# Patient Record
Sex: MALE | Race: WHITE | Employment: OTHER | URBAN - METROPOLITAN AREA
[De-identification: names, ages, dates, MRNs, and addresses within clinical notes are randomized per-mention and may not be internally consistent; named-entity substitution may affect disease eponyms.]

---

## 2018-03-27 ENCOUNTER — HOSPITAL ENCOUNTER (OUTPATIENT)
Dept: LAB | Age: 63
Discharge: HOME | End: 2018-03-27

## 2018-03-27 PROCEDURE — 88305 TISSUE EXAM BY PATHOLOGIST: CPT | Performed by: INTERNAL MEDICINE

## 2019-01-24 ENCOUNTER — HOSPITAL ENCOUNTER (INPATIENT)
Age: 64
LOS: 5 days | Discharge: HOME | DRG: 392 | End: 2019-01-29
Attending: EMERGENCY MEDICINE | Admitting: SURGERY
Payer: COMMERCIAL

## 2019-01-24 ENCOUNTER — HOSPITAL ENCOUNTER (OUTPATIENT)
Dept: CT IMAGING | Age: 64
Discharge: HOME | DRG: 392 | End: 2019-01-24
Attending: FAMILY MEDICINE
Payer: COMMERCIAL

## 2019-01-24 VITALS — BODY MASS INDEX: 33.32 KG/M2 | WEIGHT: 238 LBS | HEIGHT: 71 IN

## 2019-01-24 DIAGNOSIS — K57.20 DIVERTICULITIS OF LARGE INTESTINE WITH PERFORATION WITHOUT BLEEDING: Primary | ICD-10-CM

## 2019-01-24 DIAGNOSIS — R10.9 ABDOMINAL PAIN, UNSPECIFIED ABDOMINAL LOCATION: ICD-10-CM

## 2019-01-24 PROBLEM — K66.8 FREE INTRAPERITONEAL AIR: Status: ACTIVE | Noted: 2019-01-24

## 2019-01-24 PROBLEM — K57.92 ACUTE DIVERTICULITIS: Status: ACTIVE | Noted: 2019-01-24

## 2019-01-24 PROBLEM — D72.829 LEUKOCYTOSIS: Status: ACTIVE | Noted: 2019-01-24

## 2019-01-24 PROBLEM — R10.32 LLQ PAIN: Status: ACTIVE | Noted: 2019-01-24

## 2019-01-24 PROBLEM — K76.0 HEPATIC STEATOSIS: Status: ACTIVE | Noted: 2019-01-24

## 2019-01-24 LAB
ALBUMIN SERPL-MCNC: 3.7 G/DL (ref 3.2–4.6)
ALBUMIN/GLOB SERPL: 1.2 {RATIO}
ALP SERPL-CCNC: 31 U/L (ref 50–136)
ALT SERPL-CCNC: 21 U/L (ref 12–65)
ANION GAP SERPL CALC-SCNC: 4 MMOL/L
AST SERPL-CCNC: 6 U/L (ref 15–37)
BILIRUB SERPL-MCNC: 0.5 MG/DL (ref 0.2–1.1)
BUN SERPL-MCNC: 11 MG/DL (ref 8–23)
CALCIUM SERPL-MCNC: 9.2 MG/DL (ref 8.3–10.4)
CHLORIDE SERPL-SCNC: 107 MMOL/L (ref 98–107)
CO2 SERPL-SCNC: 27 MMOL/L (ref 21–32)
CREAT BLD-MCNC: 1 MG/DL (ref 0.8–1.5)
CREAT SERPL-MCNC: 0.91 MG/DL (ref 0.8–1.5)
GLOBULIN SER CALC-MCNC: 3.2 G/DL (ref 2.3–3.5)
GLUCOSE SERPL-MCNC: 101 MG/DL (ref 65–100)
INR PPP: 1.1
LIPASE SERPL-CCNC: 131 U/L (ref 73–393)
POTASSIUM SERPL-SCNC: 3.6 MMOL/L (ref 3.5–5.1)
PROT SERPL-MCNC: 6.9 G/DL
PROTHROMBIN TIME: 14.2 SEC (ref 11.7–14.5)
SODIUM SERPL-SCNC: 138 MMOL/L (ref 136–145)

## 2019-01-24 PROCEDURE — 74011250636 HC RX REV CODE- 250/636: Performed by: SURGERY

## 2019-01-24 PROCEDURE — 65270000029 HC RM PRIVATE

## 2019-01-24 PROCEDURE — 74177 CT ABD & PELVIS W/CONTRAST: CPT

## 2019-01-24 PROCEDURE — 74011000258 HC RX REV CODE- 258: Performed by: FAMILY MEDICINE

## 2019-01-24 PROCEDURE — 74011250636 HC RX REV CODE- 250/636: Performed by: EMERGENCY MEDICINE

## 2019-01-24 PROCEDURE — 82565 ASSAY OF CREATININE: CPT

## 2019-01-24 PROCEDURE — 74011636320 HC RX REV CODE- 636/320: Performed by: FAMILY MEDICINE

## 2019-01-24 PROCEDURE — 80053 COMPREHEN METABOLIC PANEL: CPT

## 2019-01-24 PROCEDURE — 99284 EMERGENCY DEPT VISIT MOD MDM: CPT | Performed by: EMERGENCY MEDICINE

## 2019-01-24 PROCEDURE — 83690 ASSAY OF LIPASE: CPT

## 2019-01-24 PROCEDURE — 85610 PROTHROMBIN TIME: CPT

## 2019-01-24 RX ORDER — METRONIDAZOLE 500 MG/100ML
500 INJECTION, SOLUTION INTRAVENOUS EVERY 12 HOURS
Status: DISCONTINUED | OUTPATIENT
Start: 2019-01-25 | End: 2019-01-25

## 2019-01-24 RX ORDER — MORPHINE SULFATE 10 MG/ML
2-6 INJECTION, SOLUTION INTRAMUSCULAR; INTRAVENOUS
Status: DISCONTINUED | OUTPATIENT
Start: 2019-01-24 | End: 2019-01-24 | Stop reason: SDUPTHER

## 2019-01-24 RX ORDER — SODIUM CHLORIDE, SODIUM LACTATE, POTASSIUM CHLORIDE, CALCIUM CHLORIDE 600; 310; 30; 20 MG/100ML; MG/100ML; MG/100ML; MG/100ML
150 INJECTION, SOLUTION INTRAVENOUS CONTINUOUS
Status: DISCONTINUED | OUTPATIENT
Start: 2019-01-24 | End: 2019-01-26

## 2019-01-24 RX ORDER — MORPHINE SULFATE 10 MG/ML
2-6 INJECTION, SOLUTION INTRAMUSCULAR; INTRAVENOUS
Status: DISCONTINUED | OUTPATIENT
Start: 2019-01-24 | End: 2019-01-25

## 2019-01-24 RX ORDER — ENOXAPARIN SODIUM 100 MG/ML
40 INJECTION SUBCUTANEOUS EVERY 24 HOURS
Status: DISCONTINUED | OUTPATIENT
Start: 2019-01-25 | End: 2019-01-29 | Stop reason: HOSPADM

## 2019-01-24 RX ORDER — CIPROFLOXACIN 2 MG/ML
400 INJECTION, SOLUTION INTRAVENOUS EVERY 12 HOURS
Status: DISCONTINUED | OUTPATIENT
Start: 2019-01-24 | End: 2019-01-24 | Stop reason: SDUPTHER

## 2019-01-24 RX ORDER — METRONIDAZOLE 500 MG/100ML
500 INJECTION, SOLUTION INTRAVENOUS EVERY 6 HOURS
Status: DISCONTINUED | OUTPATIENT
Start: 2019-01-24 | End: 2019-01-24 | Stop reason: SDUPTHER

## 2019-01-24 RX ORDER — CIPROFLOXACIN 2 MG/ML
400 INJECTION, SOLUTION INTRAVENOUS EVERY 12 HOURS
Status: DISCONTINUED | OUTPATIENT
Start: 2019-01-25 | End: 2019-01-29 | Stop reason: HOSPADM

## 2019-01-24 RX ORDER — ACETAMINOPHEN 500 MG
1000 TABLET ORAL
Status: DISCONTINUED | OUTPATIENT
Start: 2019-01-24 | End: 2019-01-29 | Stop reason: HOSPADM

## 2019-01-24 RX ORDER — ONDANSETRON 2 MG/ML
4 INJECTION INTRAMUSCULAR; INTRAVENOUS
Status: DISCONTINUED | OUTPATIENT
Start: 2019-01-24 | End: 2019-01-29 | Stop reason: HOSPADM

## 2019-01-24 RX ORDER — CIPROFLOXACIN 2 MG/ML
400 INJECTION, SOLUTION INTRAVENOUS
Status: COMPLETED | OUTPATIENT
Start: 2019-01-24 | End: 2019-01-24

## 2019-01-24 RX ORDER — FAMOTIDINE 10 MG/ML
20 INJECTION INTRAVENOUS EVERY 12 HOURS
Status: DISCONTINUED | OUTPATIENT
Start: 2019-01-24 | End: 2019-01-29 | Stop reason: HOSPADM

## 2019-01-24 RX ORDER — SODIUM CHLORIDE 0.9 % (FLUSH) 0.9 %
10 SYRINGE (ML) INJECTION
Status: COMPLETED | OUTPATIENT
Start: 2019-01-24 | End: 2019-01-24

## 2019-01-24 RX ORDER — METRONIDAZOLE 500 MG/100ML
500 INJECTION, SOLUTION INTRAVENOUS
Status: COMPLETED | OUTPATIENT
Start: 2019-01-24 | End: 2019-01-24

## 2019-01-24 RX ADMIN — SODIUM CHLORIDE 100 ML: 900 INJECTION, SOLUTION INTRAVENOUS at 17:42

## 2019-01-24 RX ADMIN — Medication 10 ML: at 17:42

## 2019-01-24 RX ADMIN — METRONIDAZOLE 500 MG: 500 INJECTION, SOLUTION INTRAVENOUS at 19:50

## 2019-01-24 RX ADMIN — SODIUM CHLORIDE, SODIUM LACTATE, POTASSIUM CHLORIDE, AND CALCIUM CHLORIDE 150 ML/HR: 600; 310; 30; 20 INJECTION, SOLUTION INTRAVENOUS at 22:59

## 2019-01-24 RX ADMIN — CIPROFLOXACIN 400 MG: 2 INJECTION, SOLUTION INTRAVENOUS at 19:51

## 2019-01-24 RX ADMIN — DIATRIZOATE MEGLUMINE AND DIATRIZOATE SODIUM 15 ML: 660; 100 LIQUID ORAL; RECTAL at 17:42

## 2019-01-24 RX ADMIN — FAMOTIDINE 20 MG: 10 INJECTION, SOLUTION INTRAVENOUS at 22:59

## 2019-01-24 RX ADMIN — IOPAMIDOL 100 ML: 755 INJECTION, SOLUTION INTRAVENOUS at 17:42

## 2019-01-24 RX ADMIN — SODIUM CHLORIDE, SODIUM LACTATE, POTASSIUM CHLORIDE, AND CALCIUM CHLORIDE 1000 ML: 600; 310; 30; 20 INJECTION, SOLUTION INTRAVENOUS at 19:50

## 2019-01-24 NOTE — ED PROVIDER NOTES
Patient presents to emergency departments from CT for positive CT results perforated diverticulitis. Patient states he's been having intermittent abdominal pain since Deb, primarily left lower quadrant, which seemed to get better and worse until this past several days were persisted. The history is provided by the patient and the spouse. Other This is a new problem. The current episode started less than 1 hour ago. The problem occurs constantly. The problem has not changed since onset. Associated symptoms include abdominal pain. Pertinent negatives include no chest pain, no headaches and no shortness of breath. The symptoms are aggravated by standing and walking (palpation). Nothing relieves the symptoms. He has tried nothing for the symptoms. The treatment provided no relief. Past Medical History:  
Diagnosis Date  Anxiety and depression  Dyslipidemia  Fatty liver  GERD (gastroesophageal reflux disease)  Hyperlipidemia, mixed 12/14/2015  Obesity Past Surgical History:  
Procedure Laterality Date  HX COLONOSCOPY  2006  HX TONSILLECTOMY    
 as a child Family History:  
Problem Relation Age of Onset  Cancer Mother Leukemia  Cancer Father Colon, Prostate, & squamous cell  Heart Disease Father CHF  Diabetes Father Type II (NIDDM)  Cancer Sister Lung  Cancer Brother Colon Social History Socioeconomic History  Marital status:  Spouse name: Not on file  Number of children: Not on file  Years of education: Not on file  Highest education level: Not on file Social Needs  Financial resource strain: Not on file  Food insecurity - worry: Not on file  Food insecurity - inability: Not on file  Transportation needs - medical: Not on file  Transportation needs - non-medical: Not on file Occupational History  Not on file Tobacco Use  Smoking status: Never Smoker  Smokeless tobacco: Never Used Substance and Sexual Activity  Alcohol use: Yes Comment: occassionally  Drug use: Not on file  Sexual activity: Not on file Other Topics Concern  Not on file Social History Narrative  Not on file ALLERGIES: Patient has no known allergies. Review of Systems Constitutional: Negative for chills and fever. Respiratory: Negative for shortness of breath. Cardiovascular: Negative for chest pain. Gastrointestinal: Positive for abdominal pain. Negative for blood in stool, constipation, diarrhea, nausea and vomiting. Neurological: Negative for headaches. All other systems reviewed and are negative. Vitals:  
 01/24/19 1821 BP: 125/79 Pulse: 79 Resp: 17 Temp: 99.4 °F (37.4 °C) SpO2: 94% Physical Exam  
Constitutional: He is oriented to person, place, and time. He appears well-developed and well-nourished. No distress. HENT:  
Head: Normocephalic and atraumatic. Right Ear: External ear normal.  
Left Ear: External ear normal.  
Mouth/Throat: Oropharynx is clear and moist.  
Eyes: Conjunctivae and EOM are normal. Pupils are equal, round, and reactive to light. Neck: Normal range of motion. Neck supple. Cardiovascular: Normal rate, regular rhythm, normal heart sounds and intact distal pulses. Pulmonary/Chest: Effort normal and breath sounds normal.  
Abdominal: Soft. Bowel sounds are normal. He exhibits no shifting dullness, no distension, no pulsatile liver, no fluid wave, no abdominal bruit, no ascites, no pulsatile midline mass and no mass. There is no hepatosplenomegaly. There is tenderness in the suprapubic area and left lower quadrant. There is no rigidity, no rebound, no guarding, no CVA tenderness, no tenderness at McBurney's point and negative Araya's sign. No hernia. Musculoskeletal: Normal range of motion. He exhibits no edema.   
Neurological: He is alert and oriented to person, place, and time. Skin: Skin is warm and dry. Capillary refill takes less than 2 seconds. Psychiatric: He has a normal mood and affect. Nursing note and vitals reviewed. MDM Number of Diagnoses or Management Options Diverticulitis of large intestine with perforation without bleeding: new and requires workup Amount and/or Complexity of Data Reviewed Clinical lab tests: reviewed and ordered Tests in the radiology section of CPT®: reviewed Review and summarize past medical records: yes Discuss the patient with other providers: yes Independent visualization of images, tracings, or specimens: yes Risk of Complications, Morbidity, and/or Mortality Presenting problems: high Diagnostic procedures: high Management options: high Patient Progress Patient progress: stable Procedures

## 2019-01-25 LAB
ANION GAP SERPL CALC-SCNC: 5 MMOL/L
BUN SERPL-MCNC: 10 MG/DL (ref 8–23)
CALCIUM SERPL-MCNC: 8.9 MG/DL (ref 8.3–10.4)
CHLORIDE SERPL-SCNC: 109 MMOL/L (ref 98–107)
CO2 SERPL-SCNC: 25 MMOL/L (ref 21–32)
CREAT SERPL-MCNC: 0.88 MG/DL (ref 0.8–1.5)
ERYTHROCYTE [DISTWIDTH] IN BLOOD BY AUTOMATED COUNT: 13.2 % (ref 11.9–14.6)
GLUCOSE BLD STRIP.AUTO-MCNC: 89 MG/DL (ref 65–100)
GLUCOSE SERPL-MCNC: 110 MG/DL (ref 65–100)
HCT VFR BLD AUTO: 41.1 % (ref 41.1–50.3)
HGB BLD-MCNC: 13.3 G/DL (ref 13.6–17.2)
MCH RBC QN AUTO: 26.8 PG (ref 26.1–32.9)
MCHC RBC AUTO-ENTMCNC: 32.4 G/DL (ref 31.4–35)
MCV RBC AUTO: 82.7 FL (ref 79.6–97.8)
NRBC # BLD: 0 K/UL (ref 0–0.2)
PLATELET # BLD AUTO: 227 K/UL (ref 150–450)
PMV BLD AUTO: 10.6 FL (ref 9.4–12.3)
POTASSIUM SERPL-SCNC: 4.1 MMOL/L (ref 3.5–5.1)
RBC # BLD AUTO: 4.97 M/UL (ref 4.23–5.6)
SODIUM SERPL-SCNC: 139 MMOL/L (ref 136–145)
WBC # BLD AUTO: 10.5 K/UL (ref 4.3–11.1)

## 2019-01-25 PROCEDURE — 82962 GLUCOSE BLOOD TEST: CPT

## 2019-01-25 PROCEDURE — 80048 BASIC METABOLIC PNL TOTAL CA: CPT

## 2019-01-25 PROCEDURE — 85027 COMPLETE CBC AUTOMATED: CPT

## 2019-01-25 PROCEDURE — 65270000029 HC RM PRIVATE

## 2019-01-25 PROCEDURE — 77030020255 HC SOL INJ LR 1000ML BG

## 2019-01-25 PROCEDURE — 74011250636 HC RX REV CODE- 250/636: Performed by: SURGERY

## 2019-01-25 RX ORDER — MORPHINE SULFATE 4 MG/ML
4 INJECTION, SOLUTION INTRAMUSCULAR; INTRAVENOUS
Status: DISCONTINUED | OUTPATIENT
Start: 2019-01-25 | End: 2019-01-25

## 2019-01-25 RX ORDER — MORPHINE SULFATE 2 MG/ML
2 INJECTION, SOLUTION INTRAMUSCULAR; INTRAVENOUS
Status: DISCONTINUED | OUTPATIENT
Start: 2019-01-25 | End: 2019-01-29 | Stop reason: HOSPADM

## 2019-01-25 RX ORDER — MORPHINE SULFATE 10 MG/ML
6 INJECTION, SOLUTION INTRAMUSCULAR; INTRAVENOUS
Status: DISCONTINUED | OUTPATIENT
Start: 2019-01-25 | End: 2019-01-28

## 2019-01-25 RX ORDER — METRONIDAZOLE 500 MG/100ML
500 INJECTION, SOLUTION INTRAVENOUS EVERY 6 HOURS
Status: DISCONTINUED | OUTPATIENT
Start: 2019-01-25 | End: 2019-01-29 | Stop reason: HOSPADM

## 2019-01-25 RX ORDER — MORPHINE SULFATE 2 MG/ML
2 INJECTION, SOLUTION INTRAMUSCULAR; INTRAVENOUS
Status: DISCONTINUED | OUTPATIENT
Start: 2019-01-25 | End: 2019-01-25

## 2019-01-25 RX ORDER — MORPHINE SULFATE 4 MG/ML
4 INJECTION, SOLUTION INTRAMUSCULAR; INTRAVENOUS
Status: DISCONTINUED | OUTPATIENT
Start: 2019-01-25 | End: 2019-01-29 | Stop reason: HOSPADM

## 2019-01-25 RX ORDER — METRONIDAZOLE 500 MG/100ML
500 INJECTION, SOLUTION INTRAVENOUS EVERY 6 HOURS
Status: DISCONTINUED | OUTPATIENT
Start: 2019-01-25 | End: 2019-01-25 | Stop reason: SDUPTHER

## 2019-01-25 RX ADMIN — METRONIDAZOLE 500 MG: 500 INJECTION, SOLUTION INTRAVENOUS at 16:41

## 2019-01-25 RX ADMIN — MORPHINE SULFATE 2 MG: 10 INJECTION INTRAVENOUS at 03:00

## 2019-01-25 RX ADMIN — FAMOTIDINE 20 MG: 10 INJECTION, SOLUTION INTRAVENOUS at 21:29

## 2019-01-25 RX ADMIN — SODIUM CHLORIDE, SODIUM LACTATE, POTASSIUM CHLORIDE, AND CALCIUM CHLORIDE 150 ML/HR: 600; 310; 30; 20 INJECTION, SOLUTION INTRAVENOUS at 14:00

## 2019-01-25 RX ADMIN — CIPROFLOXACIN 400 MG: 2 INJECTION, SOLUTION INTRAVENOUS at 21:31

## 2019-01-25 RX ADMIN — CIPROFLOXACIN 400 MG: 2 INJECTION, SOLUTION INTRAVENOUS at 09:02

## 2019-01-25 RX ADMIN — MORPHINE SULFATE 2 MG: 2 INJECTION, SOLUTION INTRAMUSCULAR; INTRAVENOUS at 17:14

## 2019-01-25 RX ADMIN — METRONIDAZOLE 500 MG: 500 INJECTION, SOLUTION INTRAVENOUS at 22:46

## 2019-01-25 RX ADMIN — FAMOTIDINE 20 MG: 10 INJECTION, SOLUTION INTRAVENOUS at 09:02

## 2019-01-25 RX ADMIN — ONDANSETRON 4 MG: 2 INJECTION INTRAMUSCULAR; INTRAVENOUS at 02:59

## 2019-01-25 RX ADMIN — ENOXAPARIN SODIUM 40 MG: 40 INJECTION SUBCUTANEOUS at 10:36

## 2019-01-25 RX ADMIN — METRONIDAZOLE 500 MG: 500 INJECTION, SOLUTION INTRAVENOUS at 10:36

## 2019-01-25 NOTE — PROGRESS NOTES
01/25/19 1718 Dual Skin Pressure Injury Assessment Dual Skin Pressure Injury Assessment [facility] Second Care Provider (Based on [address] [place] [address]) Polly Oneill

## 2019-01-25 NOTE — ROUTINE PROCESS
TRANSFER - OUT REPORT: 
 
Verbal report given to Lacey Pena RN(name) on Ronda Eason  being transferred to [facility](unit) for routine progression of care Report consisted of patients Situation, Background, Assessment and  
Recommendations(SBAR). Information from the following report(s) SBAR was reviewed with the receiving nurse. Lines:    
 
Opportunity for questions and clarification was provided. Patient transported with: 
 Ambarella

## 2019-01-25 NOTE — PROGRESS NOTES
H&P/Consult Note/Progress Note/Office Note: Jaja Rosas  MRN: [id number]  :1955  Age:63 y.o. 
 
HPI: Jaja Rosas is a 70 y.o. male who reported a 3-4 week h/o progressive, constant, severe LLQ and left groin pain, without radiation. Nothing made the pain better or worse. No associated fever. He went to his primary care doctor office today. WBC was elevated CT scan was obtained as below. He was moved to ER and gen surgery was consulted. 3/27/18 s/p colonoscopy; Dr Emelia Barksdale DIAGNOSIS  COLON POLYP: FRAGMENTS OF HYPERPLASTIC POLYP, FOCALLY ACUTELY INFLAMED. Electronically signed out on 3/28/2018 09:48 by NIKITA Dc M.D.  
 
19 CT abd/pelvis with oral and IV contrast 
Limited evaluation of the lung bases and base of the mediastinum demonstrates no 
significant abnormalities.  
  
The Liver is fatty infiltrated although homogeneous. The spleen is homogeneous 
in attenuation. No contour deforming or enhancing mass lesions are seen of the 
pancreas or adrenal glands. The gallbladder has an unremarkable CT appearance 
without radiopaque stones or pericholecystic fluid/inflammatory changes. The 
kidneys enhance symmetrically and no evidence of hydronephrosis is seen.   
  
The visualized loops of small bowel and colon are normal in caliber. The 
appendix may be partially visualized on image 56. No acutely inflamed structure 
is seen adjacent to the cecum. However, moderate diverticulosis is seen of the 
mid and distal descending colon and throughout the sigmoid colon. Acute 
inflammatory changes are seen in the proximal sigmoid colon best appreciated on 
axial image 77 consistent with acute reticulitis. Small adjacent fluid is seen 
which is likely reactive. No enhancing abscess is seen. However, small free air 
collections are seen in the upper abdomen most evident on axial image 20. This 
suggest perforation.  It should be noted that the appearance is somewhat atypical given that no free air is seen adjacent to the area of acute inflammation in the 
proximal sigmoid colon. No definite additional abnormality is seen to suggest an 
additional etiology. It can only be said that some gas collections are seen 
about the gastric antrum and duodenal bulb although no definite fluid or acute 
changes are seen of the structures to strongly suggest these as a potential 
source of free air. No adenopathy is seen. The abdominal aorta is unremarkable 
in appearance. 
  
CT PELVIS: 
No abnormal pelvic fluid collections or inflammatory changes are present. No 
pelvic adenopathy is seen. The urinary bladder is unremarkable. 
  
IMPRESSION:   
1. Findings consistent with acute diverticulitis at the level of the proximal 
sigmoid colon. No enhancing abscess is seen. However, there is free 
intraperitoneal air in the upper abdomen suggesting perforation. Although this 
most likely results from the acute diverticulitis, the appearance is somewhat 
unusual given that no free air lesions are seen about the inflamed segment of 
sigmoid colon but rather all are seen in the upper abdomen. Some small gas 
collections are seen about the gastroduodenal junction which could suggest this 
as a source of free air although no fluid or focal inflammatory changes are 
clearly appreciated to strongly suggest this. 
  
Additional hx: 
1/25/19 LLQ pain persists; wbc better 1/22/6/19 LLQ better; Hypokalemia has developed; changed LR to maint IVF Past Medical History:  
Diagnosis Date  Anxiety and depression  Dyslipidemia  Fatty liver  GERD (gastroesophageal reflux disease)  Hyperlipidemia, mixed 12/14/2015  Obesity Past Surgical History:  
Procedure Laterality Date  HX COLONOSCOPY  2006  HX TONSILLECTOMY    
 as a child Current Facility-Administered Medications Medication Dose Route Frequency  metroNIDAZOLE (FLAGYL) IVPB premix 500 mg  500 mg IntraVENous Q6H  
 famotidine (PF) (PEPCID) injection 20 mg  20 mg IntraVENous Q12H  
 enoxaparin (LOVENOX) injection 40 mg  40 mg SubCUTAneous Q24H  
 acetaminophen (TYLENOL) tablet 1,000 mg  1,000 mg Oral Q6H PRN  
 ondansetron (ZOFRAN) injection 4 mg  4 mg IntraVENous Q4H PRN  
 lactated Ringers infusion  150 mL/hr IntraVENous CONTINUOUS  
 morphine 10 mg/ml injection 2-6 mg  2-6 mg IntraVENous Q1H PRN  
 ciprofloxacin (CIPRO) 400 mg IVPB (premix)  400 mg IntraVENous Q12H Current Outpatient Medications Medication Sig  
 naproxen (NAPROSYN) 500 mg tablet Take 1 Tab by mouth two (2) times daily (with meals).  fenofibric acid (TRILIPIX) 135 mg capsule Take 1 Cap by mouth daily.  simvastatin (ZOCOR) 20 mg tablet Take 1 Tab by mouth nightly.  sildenafil citrate (VIAGRA) 50 mg tablet Take one tablet one hour before intercourse  aspirin delayed-release 81 mg tablet Take 81 mg by mouth daily. Patient has no known allergies. Social History Socioeconomic History  Marital status:  Spouse name: Not on file  Number of children: Not on file  Years of education: Not on file  Highest education level: Not on file Tobacco Use  Smoking status: Never Smoker  Smokeless tobacco: Never Used Substance and Sexual Activity  Alcohol use: Yes Comment: occassionally Social History Tobacco Use Smoking Status Never Smoker Smokeless Tobacco Never Used Family History Problem Relation Age of Onset  Cancer Mother Leukemia  Cancer Father Colon, Prostate, & squamous cell  Heart Disease Father CHF  Diabetes Father Type II (NIDDM)  Cancer Sister Lung  Cancer Brother Colon ROS: The patient has no difficulty with chest pain or shortness of breath. No fever or chills. Comprehensive review of systems was otherwise unremarkable except as noted above. Physical Exam:  
Visit Vitals /82 Pulse 79 Temp 99.4 °F (37.4 °C) Resp 17 Ht 5' 11\" (1.803 m) Wt 238 lb (108 kg) SpO2 95% BMI 33.19 kg/m² Vitals:  
 01/25/19 1223 01/25/19 1300 01/25/19 1400 01/25/19 1441 BP:  147/83 139/82 Pulse:      
Resp:      
Temp:      
SpO2: 94% 95% 95% 95% Weight:      
Height:      
 
01/25 0701 - 01/25 1900 In: -  
Out: 739 [[id number]] No intake/output data recorded. Constitutional: Alert, oriented, cooperative patient in no acute distress; appears stated age Eyes:Sclera are clear. EOMs intact ENMT: no external lesions gross hearing normal; no obvious neck masses, no ear or lip lesions, nares normal 
CV: RRR. Normal perfusion Resp: No JVD. Breathing is  non-labored; no audible wheezing. GI: soft and non-distended; obese; Tender LLQ Musculoskeletal: unremarkable with normal function. No embolic signs or cyanosis. Neuro:  Oriented; moves all 4; no focal deficits Psychiatric: normal affect and mood, no memory impairment Recent vitals (if inpt): 
Patient Vitals for the past 24 hrs: 
 BP Temp Pulse Resp SpO2 Height Weight  
01/25/19 1441     95 %    
01/25/19 1400 139/82    95 %    
01/25/19 1300 147/83    95 %    
01/25/19 1223     94 %    
01/25/19 1221 142/80        
01/25/19 1100 117/72    94 %    
01/25/19 1000 127/68    94 %    
01/25/19 0800 137/73    93 %    
01/25/19 0759     94 %    
01/25/19 0601     94 %    
01/25/19 0600 127/75    93 %    
01/25/19 0459     94 %    
01/25/19 0400 127/75    91 %    
01/25/19 0300 135/80    92 %    
01/25/19 0202     95 %    
01/25/19 0201     95 %    
01/25/19 0200 123/73    95 %    
01/25/19 0000 129/77    93 %    
01/24/19 2008      5' 11\" (1.803 m) 238 lb (108 kg) 01/24/19 1821 125/79 99.4 °F (37.4 °C) 79 17 94 %   Labs: 
Recent Labs  
  01/25/19 
4980 01/24/19 
2150 WBC 10.5  --   
HGB 13.3*  --   
  --   
 138 K 4.1 3.6 * 107 CO2 25 27 BUN 10 11 CREA 0.88 0.91  
* 101* PTP  --  14.2 INR  --  1.1 TBILI  --  0.5 SGOT  --  6* ALT  --  21  
AP  --  31* LPSE  --  131 Lab Results Component Value Date/Time WBC 10.5 01/25/2019 03:12 AM  
 HGB 13.3 (L) 01/25/2019 03:12 AM  
 PLATELET 653 93/05/0980 03:12 AM  
 Sodium 139 01/25/2019 03:12 AM  
 Potassium 4.1 01/25/2019 03:12 AM  
 Chloride 109 (H) 01/25/2019 03:12 AM  
 CO2 25 01/25/2019 03:12 AM  
 BUN 10 01/25/2019 03:12 AM  
 Creatinine 0.88 01/25/2019 03:12 AM  
 Glucose 110 (H) 01/25/2019 03:12 AM  
 INR 1.1 01/24/2019 09:50 PM  
 Bilirubin, total 0.5 01/24/2019 09:50 PM  
 AST (SGOT) 6 (L) 01/24/2019 09:50 PM  
 ALT (SGPT) 21 01/24/2019 09:50 PM  
 Alk. phosphatase 31 (L) 01/24/2019 09:50 PM  
 Lipase 131 01/24/2019 09:50 PM  
 
 
CT Results  (Last 48 hours) 01/24/19 1738  CT ABD PELV W CONT Final result Impression:  IMPRESSION:    
1. Findings consistent with acute diverticulitis at the level of the proximal  
sigmoid colon. No enhancing abscess is seen. However, there is free  
intraperitoneal air in the upper abdomen suggesting perforation. Although this  
most likely results from the acute diverticulitis, the appearance is somewhat  
unusual given that no free air lesions are seen about the inflamed segment of  
sigmoid colon but rather all are seen in the upper abdomen. Some small gas  
collections are seen about the gastroduodenal junction which could suggest this  
as a source of free air although no fluid or focal inflammatory changes are  
clearly appreciated to strongly suggest this. These critical findings were discussed with Dr. Janet Nj by myself personally at  
5:55 PM on 1/24/2019. The CT technologist is to immediately send the patient to  
the emergency room. Ashley Charisma Narrative:  CT ABDOMEN AND PELVIS WITH INTRAVENOUS CONTRAST DATED 1/24/2019. History:  Moderate left lower quadrant abdominal pain for one month. Comparison: None. Technique:   Multiple contiguous helical CT images reconstructed at 5 mm  
intervals were obtained from above the diaphragms through the ischial  
tuberosities following oral and 100 cc Isovue-370 without acute complication. All CT scans performed at this facility use one or all of the following: Automated exposure control, adjustment of the mA and/or kVp according to  
patient's size, iterative reconstruction. Findings:  
CT ABDOMEN:    
Limited evaluation of the lung bases and base of the mediastinum demonstrates no  
significant abnormalities. The Liver is fatty infiltrated although homogeneous. The spleen is homogeneous  
in attenuation. No contour deforming or enhancing mass lesions are seen of the  
pancreas or adrenal glands. The gallbladder has an unremarkable CT appearance  
without radiopaque stones or pericholecystic fluid/inflammatory changes. The  
kidneys enhance symmetrically and no evidence of hydronephrosis is seen. The visualized loops of small bowel and colon are normal in caliber. The  
appendix may be partially visualized on image 56. No acutely inflamed structure  
is seen adjacent to the cecum. However, moderate diverticulosis is seen of the  
mid and distal descending colon and throughout the sigmoid colon. Acute  
inflammatory changes are seen in the proximal sigmoid colon best appreciated on  
axial image 77 consistent with acute reticulitis. Small adjacent fluid is seen  
which is likely reactive. No enhancing abscess is seen. However, small free air  
collections are seen in the upper abdomen most evident on axial image 20. This  
suggest perforation. It should be noted that the appearance is somewhat atypical  
given that no free air is seen adjacent to the area of acute inflammation in the  
proximal sigmoid colon. No definite additional abnormality is seen to suggest an  
additional etiology. It can only be said that some gas collections are seen  
about the gastric antrum and duodenal bulb although no definite fluid or acute  
changes are seen of the structures to strongly suggest these as a potential  
source of free air. No adenopathy is seen. The abdominal aorta is unremarkable  
in appearance. CT PELVIS:  
No abnormal pelvic fluid collections or inflammatory changes are present. No  
pelvic adenopathy is seen. The urinary bladder is unremarkable. I reviewed recent labs, recent radiologic studies, and pertinent records including other doctor notes if needed. I independently reviewed radiology images for studies I described above or studies I have ordered. Admission date (for inpatients): 1/24/2019 * No surgery found *  * No surgery found * ASSESSMENT/PLAN: 
Problem List  Date Reviewed: 1/24/2019 Codes Class Noted LLQ pain ICD-10-CM: R10.32 
ICD-9-CM: 789.04  1/24/2019 Free intraperitoneal air ICD-10-CM: K66.8 ICD-9-CM: 568.89  1/24/2019 * (Principal) Acute diverticulitis ICD-10-CM: O40.73 
ICD-9-CM: 562.11  1/24/2019 Leukocytosis ICD-10-CM: X73.765 ICD-9-CM: 288.60  1/24/2019 Diverticulitis of colon with perforation ICD-10-CM: K57.20 ICD-9-CM: 562.11  1/24/2019 Hepatic steatosis ICD-10-CM: K76.0 ICD-9-CM: 571.8  1/24/2019 Hyperlipidemia, mixed ICD-10-CM: E78.2 ICD-9-CM: 272.2  12/14/2015 Principal Problem: 
  Acute diverticulitis (1/24/2019) Active Problems: LLQ pain (1/24/2019) Free intraperitoneal air (1/24/2019) Leukocytosis (1/24/2019) Diverticulitis of colon with perforation (1/24/2019) Hepatic steatosis (1/24/2019) Hepatic steatosis Weight Loss Suspected acute sigmoid diverticulitis with free air Symptoms for 1 month Free air could be older He is not septic. Continue NPO/bowel rest, IV Abx and observation I discussed that if he worsens we could need to proceed with surgery/washout and probably colostomy I discussed the uncertainty form the CT as to the exact location of the perforation based on the radiologists description of the location of free air only being seen in the upper abdomen. Hypokalemia Change LR to D5 1/2NS + 20 KCl at 125 cc/hr I have personally performed a face-to-face diagnostic evaluation and management  service on this patient. I have independently seen the patient. I have independently obtained the above history from the patient/family. I have independently examined the patient with above findings. I have independently reviewed data/labs for this patient and developed the above plan of care (MDM). Katheryn David MD, FACS

## 2019-01-25 NOTE — PROGRESS NOTES
TRANSFER - IN REPORT: 
 
Verbal report received from Macy Torres RN(name) on Ashia Louie  being received from ER(unit) for routine progression of care Report consisted of patients Situation, Background, Assessment and  
Recommendations(SBAR). Information from the following report(s) SBAR, ED Summary, Intake/Output and Recent Results was reviewed with the receiving nurse. Opportunity for questions and clarification was provided. Assessment completed upon patients arrival to unit and care assumed.

## 2019-01-25 NOTE — PROGRESS NOTES
Assessment complete via flow sheet. Pt A&Ox3. Respirations even and unlabored, CTA. S1 S2 auscultated. Pt on room air. Pt reports pain level of 4 out of 10, pt given PRN morphine . Bowel sounds active, abdomen soft,tender. Denies other needs. Bed in lowest position, side rails up 3, call bell in reach. Instructed to call for assistance. Pt verbalized understanding. Plan of care reviewed with patient.

## 2019-01-25 NOTE — PROGRESS NOTES
Chart screened by  for discharge planning. No needs identified at this time. Please consult  if any new issues arise. JASWINDER Azevedo  [address] [place] [address] [email or web address]

## 2019-01-25 NOTE — H&P
H&P/Consult Note/Progress Note/Office Note: Laquita Monahan  MRN: [id number]  :1955  Age:63 y.o. 
 
HPI: Laquita Monahan is a 70 y.o. male who reported a 3-4 week h/o progressive, constant, severe LLQ and left groin pain, without radiation. Nothing made the pain better or worse. No associated fever. He went to his primary care doctor office today. WBC was elevated CT scan was obtained as below. He was moved to ER and gen surgery was consulted. 3/27/18 s/p colonoscopy; Dr Josr Arnold DIAGNOSIS  COLON POLYP: FRAGMENTS OF HYPERPLASTIC POLYP, FOCALLY ACUTELY INFLAMED. Electronically signed out on 3/28/2018 09:48 by NIKITA Preciado M.D.  
 
19 CT abd/pelvis with oral and IV contrast 
Limited evaluation of the lung bases and base of the mediastinum demonstrates no 
significant abnormalities.  
  
The Liver is fatty infiltrated although homogeneous. The spleen is homogeneous 
in attenuation. No contour deforming or enhancing mass lesions are seen of the 
pancreas or adrenal glands. The gallbladder has an unremarkable CT appearance 
without radiopaque stones or pericholecystic fluid/inflammatory changes. The 
kidneys enhance symmetrically and no evidence of hydronephrosis is seen.   
  
The visualized loops of small bowel and colon are normal in caliber. The 
appendix may be partially visualized on image 56. No acutely inflamed structure 
is seen adjacent to the cecum. However, moderate diverticulosis is seen of the 
mid and distal descending colon and throughout the sigmoid colon. Acute 
inflammatory changes are seen in the proximal sigmoid colon best appreciated on 
axial image 77 consistent with acute reticulitis. Small adjacent fluid is seen 
which is likely reactive. No enhancing abscess is seen. However, small free air 
collections are seen in the upper abdomen most evident on axial image 20. This 
suggest perforation.  It should be noted that the appearance is somewhat atypical given that no free air is seen adjacent to the area of acute inflammation in the 
proximal sigmoid colon. No definite additional abnormality is seen to suggest an 
additional etiology. It can only be said that some gas collections are seen 
about the gastric antrum and duodenal bulb although no definite fluid or acute 
changes are seen of the structures to strongly suggest these as a potential 
source of free air. No adenopathy is seen. The abdominal aorta is unremarkable 
in appearance. 
  
CT PELVIS: 
No abnormal pelvic fluid collections or inflammatory changes are present. No 
pelvic adenopathy is seen. The urinary bladder is unremarkable. 
  
IMPRESSION:   
1. Findings consistent with acute diverticulitis at the level of the proximal 
sigmoid colon. No enhancing abscess is seen. However, there is free 
intraperitoneal air in the upper abdomen suggesting perforation. Although this 
most likely results from the acute diverticulitis, the appearance is somewhat 
unusual given that no free air lesions are seen about the inflamed segment of 
sigmoid colon but rather all are seen in the upper abdomen. Some small gas 
collections are seen about the gastroduodenal junction which could suggest this 
as a source of free air although no fluid or focal inflammatory changes are 
clearly appreciated to strongly suggest this. 
  
 
 
 
Past Medical History:  
Diagnosis Date  Anxiety and depression  Dyslipidemia  Fatty liver  GERD (gastroesophageal reflux disease)  Hyperlipidemia, mixed 12/14/2015  Obesity Past Surgical History:  
Procedure Laterality Date  HX COLONOSCOPY  2006  HX TONSILLECTOMY    
 as a child Current Facility-Administered Medications Medication Dose Route Frequency  lactated ringers bolus infusion 1,000 mL  1,000 mL IntraVENous ONCE  ciprofloxacin (CIPRO) 400 mg IVPB (premix)  400 mg IntraVENous NOW  metroNIDAZOLE (FLAGYL) IVPB premix 500 mg  500 mg IntraVENous NOW  famotidine (PF) (PEPCID) injection 20 mg  20 mg IntraVENous Q12H  
 [START ON 1/25/2019] enoxaparin (LOVENOX) injection 40 mg  40 mg SubCUTAneous Q24H  
 acetaminophen (TYLENOL) tablet 1,000 mg  1,000 mg Oral Q6H PRN  
 ondansetron (ZOFRAN) injection 4 mg  4 mg IntraVENous Q4H PRN  
 lactated Ringers infusion  150 mL/hr IntraVENous CONTINUOUS  
 ciprofloxacin (CIPRO) 400 mg IVPB (premix)  400 mg IntraVENous Q12H  
 metroNIDAZOLE (FLAGYL) IVPB premix 500 mg  500 mg IntraVENous Q6H  
 morphine 10 mg/ml injection 2-6 mg  2-6 mg IntraVENous Q1H PRN Current Outpatient Medications Medication Sig  
 naproxen (NAPROSYN) 500 mg tablet Take 1 Tab by mouth two (2) times daily (with meals).  fenofibric acid (TRILIPIX) 135 mg capsule Take 1 Cap by mouth daily.  simvastatin (ZOCOR) 20 mg tablet Take 1 Tab by mouth nightly.  sildenafil citrate (VIAGRA) 50 mg tablet Take one tablet one hour before intercourse  aspirin delayed-release 81 mg tablet Take 81 mg by mouth daily. Patient has no known allergies. Social History Socioeconomic History  Marital status:  Spouse name: Not on file  Number of children: Not on file  Years of education: Not on file  Highest education level: Not on file Tobacco Use  Smoking status: Never Smoker  Smokeless tobacco: Never Used Substance and Sexual Activity  Alcohol use: Yes Comment: occassionally Social History Tobacco Use Smoking Status Never Smoker Smokeless Tobacco Never Used Family History Problem Relation Age of Onset  Cancer Mother Leukemia  Cancer Father Colon, Prostate, & squamous cell  Heart Disease Father CHF  Diabetes Father Type II (NIDDM)  Cancer Sister Lung  Cancer Brother Colon ROS: The patient has no difficulty with chest pain or shortness of breath. No fever or chills.   Comprehensive review of systems was otherwise unremarkable except as noted above. Physical Exam:  
Visit Vitals /79 (BP 1 Location: Left arm, BP Patient Position: Sitting) Pulse 79 Temp 99.4 °F (37.4 °C) Resp 17 SpO2 94% Vitals:  
 01/24/19 1821 BP: 125/79 Pulse: 79 Resp: 17 Temp: 99.4 °F (37.4 °C) SpO2: 94% No intake/output data recorded. No intake/output data recorded. Constitutional: Alert, oriented, cooperative patient in no acute distress; appears stated age Eyes:Sclera are clear. EOMs intact ENMT: no external lesions gross hearing normal; no obvious neck masses, no ear or lip lesions, nares normal 
CV: RRR. Normal perfusion Resp: No JVD. Breathing is  non-labored; no audible wheezing. GI: soft and non-distended; obese; Tender LLQ Musculoskeletal: unremarkable with normal function. No embolic signs or cyanosis. Neuro:  Oriented; moves all 4; no focal deficits Psychiatric: normal affect and mood, no memory impairment Recent vitals (if inpt): 
Patient Vitals for the past 24 hrs: 
 BP Temp Pulse Resp SpO2  
01/24/19 1821 125/79 99.4 °F (37.4 °C) 79 17 94 % Labs: 
Recent Labs  
  01/24/19 
1447 WBC 16.6* HGB 13.9  Lab Results Component Value Date/Time WBC 16.6 (H) 01/24/2019 02:47 PM  
 HGB 13.9 01/24/2019 02:47 PM  
 PLATELET 597 97/61/9741 02:47 PM  
 Sodium 141 06/19/2018 11:42 AM  
 Potassium 4.6 06/19/2018 11:42 AM  
 Chloride 101 06/19/2018 11:42 AM  
 CO2 21 06/19/2018 11:42 AM  
 BUN 13 06/19/2018 11:42 AM  
 Creatinine 1.02 06/19/2018 11:42 AM  
 Glucose 93 06/19/2018 11:42 AM  
 Bilirubin, total 0.5 06/19/2018 11:42 AM  
 AST (SGOT) 25 06/19/2018 11:42 AM  
 ALT (SGPT) 30 06/19/2018 11:42 AM  
 Alk. phosphatase 30 (L) 06/19/2018 11:42 AM  
 
 
CT Results  (Last 48 hours) 01/24/19 1738  CT ABD PELV W CONT Final result Impression:  IMPRESSION:    
1.   Findings consistent with acute diverticulitis at the level of the proximal  
sigmoid colon. No enhancing abscess is seen. However, there is free  
intraperitoneal air in the upper abdomen suggesting perforation. Although this  
most likely results from the acute diverticulitis, the appearance is somewhat  
unusual given that no free air lesions are seen about the inflamed segment of  
sigmoid colon but rather all are seen in the upper abdomen. Some small gas  
collections are seen about the gastroduodenal junction which could suggest this  
as a source of free air although no fluid or focal inflammatory changes are  
clearly appreciated to strongly suggest this. These critical findings were discussed with Dr. Kj Pedro by myself personally at  
5:55 PM on 1/24/2019. The CT technologist is to immediately send the patient to  
the emergency room. Magdalene Guzmán Narrative:  CT ABDOMEN AND PELVIS WITH INTRAVENOUS CONTRAST DATED 1/24/2019. History: Moderate left lower quadrant abdominal pain for one month. Comparison: None. Technique:   Multiple contiguous helical CT images reconstructed at 5 mm  
intervals were obtained from above the diaphragms through the ischial  
tuberosities following oral and 100 cc Isovue-370 without acute complication. All CT scans performed at this facility use one or all of the following: Automated exposure control, adjustment of the mA and/or kVp according to  
patient's size, iterative reconstruction. Findings:  
CT ABDOMEN:    
Limited evaluation of the lung bases and base of the mediastinum demonstrates no  
significant abnormalities. The Liver is fatty infiltrated although homogeneous. The spleen is homogeneous  
in attenuation. No contour deforming or enhancing mass lesions are seen of the  
pancreas or adrenal glands. The gallbladder has an unremarkable CT appearance  
without radiopaque stones or pericholecystic fluid/inflammatory changes. The  
kidneys enhance symmetrically and no evidence of hydronephrosis is seen. The visualized loops of small bowel and colon are normal in caliber. The  
appendix may be partially visualized on image 56. No acutely inflamed structure  
is seen adjacent to the cecum. However, moderate diverticulosis is seen of the  
mid and distal descending colon and throughout the sigmoid colon. Acute  
inflammatory changes are seen in the proximal sigmoid colon best appreciated on  
axial image 77 consistent with acute reticulitis. Small adjacent fluid is seen  
which is likely reactive. No enhancing abscess is seen. However, small free air  
collections are seen in the upper abdomen most evident on axial image 20. This  
suggest perforation. It should be noted that the appearance is somewhat atypical  
given that no free air is seen adjacent to the area of acute inflammation in the  
proximal sigmoid colon. No definite additional abnormality is seen to suggest an  
additional etiology. It can only be said that some gas collections are seen  
about the gastric antrum and duodenal bulb although no definite fluid or acute  
changes are seen of the structures to strongly suggest these as a potential  
source of free air. No adenopathy is seen. The abdominal aorta is unremarkable  
in appearance. CT PELVIS:  
No abnormal pelvic fluid collections or inflammatory changes are present. No  
pelvic adenopathy is seen. The urinary bladder is unremarkable. I reviewed recent labs, recent radiologic studies, and pertinent records including other doctor notes if needed. I independently reviewed radiology images for studies I described above or studies I have ordered. Admission date (for inpatients): 1/24/2019 * No surgery found *  * No surgery found * ASSESSMENT/PLAN: 
Problem List  Date Reviewed: 1/24/2019 Codes Class Noted LLQ pain ICD-10-CM: R10.32 
ICD-9-CM: 789.04  1/24/2019 Free intraperitoneal air ICD-10-CM: K66.8 ICD-9-CM: 568.89  1/24/2019  * (Principal) Acute diverticulitis ICD-10-CM: A69.65 
ICD-9-CM: 562.11  1/24/2019 Leukocytosis ICD-10-CM: U86.277 ICD-9-CM: 288.60  1/24/2019 Diverticulitis of colon with perforation ICD-10-CM: K57.20 ICD-9-CM: 562.11  1/24/2019 Hepatic steatosis ICD-10-CM: K76.0 ICD-9-CM: 571.8  1/24/2019 Hyperlipidemia, mixed ICD-10-CM: E78.2 ICD-9-CM: 272.2  12/14/2015 Principal Problem: 
  Acute diverticulitis (1/24/2019) Active Problems: LLQ pain (1/24/2019) Free intraperitoneal air (1/24/2019) Leukocytosis (1/24/2019) Diverticulitis of colon with perforation (1/24/2019) Hepatic steatosis (1/24/2019) Hepatic steatosis Weight Loss Suspected acute sigmoid diverticulitis with free air Symptoms for 1 month Free air could be older He is not septic. Recommend admission, NPO/bowel rest, IV Abx and observation I discussed that if he worsens we could need to proceed with surgery/washout and probably colostomy I discussed the uncertainty form the CT as to the exact location of the perforation based on the radiologists description of the location of free air only being seen in the upper abdomen. I have personally performed a face-to-face diagnostic evaluation and management  service on this patient. I have independently seen the patient. I have independently obtained the above history from the patient/family. I have independently examined the patient with above findings. I have independently reviewed data/labs for this patient and developed the above plan of care (MDM). Dora Hernandez MD, FACS

## 2019-01-26 LAB
ANION GAP SERPL CALC-SCNC: 7 MMOL/L
BUN SERPL-MCNC: 13 MG/DL (ref 8–23)
CALCIUM SERPL-MCNC: 8.7 MG/DL (ref 8.3–10.4)
CHLORIDE SERPL-SCNC: 106 MMOL/L (ref 98–107)
CO2 SERPL-SCNC: 27 MMOL/L (ref 21–32)
CREAT SERPL-MCNC: 0.97 MG/DL (ref 0.8–1.5)
ERYTHROCYTE [DISTWIDTH] IN BLOOD BY AUTOMATED COUNT: 12.8 % (ref 11.9–14.6)
GLUCOSE BLD STRIP.AUTO-MCNC: 100 MG/DL (ref 65–100)
GLUCOSE BLD STRIP.AUTO-MCNC: 103 MG/DL (ref 65–100)
GLUCOSE SERPL-MCNC: 109 MG/DL (ref 65–100)
HCT VFR BLD AUTO: 39.5 % (ref 41.1–50.3)
HGB BLD-MCNC: 12.8 G/DL (ref 13.6–17.2)
MCH RBC QN AUTO: 26.9 PG (ref 26.1–32.9)
MCHC RBC AUTO-ENTMCNC: 32.4 G/DL (ref 31.4–35)
MCV RBC AUTO: 83 FL (ref 79.6–97.8)
NRBC # BLD: 0 K/UL (ref 0–0.2)
PLATELET # BLD AUTO: 208 K/UL (ref 150–450)
PMV BLD AUTO: 10.6 FL (ref 9.4–12.3)
POTASSIUM SERPL-SCNC: 3.5 MMOL/L (ref 3.5–5.1)
RBC # BLD AUTO: 4.76 M/UL (ref 4.23–5.6)
SODIUM SERPL-SCNC: 140 MMOL/L (ref 136–145)
WBC # BLD AUTO: 8.3 K/UL (ref 4.3–11.1)

## 2019-01-26 PROCEDURE — 85027 COMPLETE CBC AUTOMATED: CPT

## 2019-01-26 PROCEDURE — 74011250636 HC RX REV CODE- 250/636: Performed by: SURGERY

## 2019-01-26 PROCEDURE — 80048 BASIC METABOLIC PNL TOTAL CA: CPT

## 2019-01-26 PROCEDURE — 36415 COLL VENOUS BLD VENIPUNCTURE: CPT

## 2019-01-26 PROCEDURE — 65270000029 HC RM PRIVATE

## 2019-01-26 PROCEDURE — 82962 GLUCOSE BLOOD TEST: CPT

## 2019-01-26 RX ORDER — DEXTROSE, SODIUM CHLORIDE, AND POTASSIUM CHLORIDE 5; .45; .15 G/100ML; G/100ML; G/100ML
90 INJECTION INTRAVENOUS CONTINUOUS
Status: DISCONTINUED | OUTPATIENT
Start: 2019-01-26 | End: 2019-01-29 | Stop reason: HOSPADM

## 2019-01-26 RX ADMIN — CIPROFLOXACIN 400 MG: 2 INJECTION, SOLUTION INTRAVENOUS at 20:33

## 2019-01-26 RX ADMIN — METRONIDAZOLE 500 MG: 500 INJECTION, SOLUTION INTRAVENOUS at 21:54

## 2019-01-26 RX ADMIN — METRONIDAZOLE 500 MG: 500 INJECTION, SOLUTION INTRAVENOUS at 04:14

## 2019-01-26 RX ADMIN — FAMOTIDINE 20 MG: 10 INJECTION, SOLUTION INTRAVENOUS at 20:31

## 2019-01-26 RX ADMIN — DEXTROSE MONOHYDRATE, SODIUM CHLORIDE, AND POTASSIUM CHLORIDE 125 ML/HR: 50; 4.5; 1.49 INJECTION, SOLUTION INTRAVENOUS at 20:34

## 2019-01-26 RX ADMIN — METRONIDAZOLE 500 MG: 500 INJECTION, SOLUTION INTRAVENOUS at 16:52

## 2019-01-26 RX ADMIN — ENOXAPARIN SODIUM 40 MG: 40 INJECTION SUBCUTANEOUS at 08:37

## 2019-01-26 RX ADMIN — CIPROFLOXACIN 400 MG: 2 INJECTION, SOLUTION INTRAVENOUS at 08:37

## 2019-01-26 RX ADMIN — SODIUM CHLORIDE, SODIUM LACTATE, POTASSIUM CHLORIDE, AND CALCIUM CHLORIDE 150 ML/HR: 600; 310; 30; 20 INJECTION, SOLUTION INTRAVENOUS at 00:57

## 2019-01-26 RX ADMIN — FAMOTIDINE 20 MG: 10 INJECTION, SOLUTION INTRAVENOUS at 08:37

## 2019-01-26 RX ADMIN — MORPHINE SULFATE 2 MG: 2 INJECTION, SOLUTION INTRAMUSCULAR; INTRAVENOUS at 20:51

## 2019-01-26 RX ADMIN — METRONIDAZOLE 500 MG: 500 INJECTION, SOLUTION INTRAVENOUS at 10:57

## 2019-01-26 RX ADMIN — DEXTROSE MONOHYDRATE, SODIUM CHLORIDE, AND POTASSIUM CHLORIDE 125 ML/HR: 50; 4.5; 1.49 INJECTION, SOLUTION INTRAVENOUS at 08:37

## 2019-01-26 NOTE — PROGRESS NOTES
Pt assessment completed. Alert and oriented. Lungs CTA even and unlabored. Heart sounds regular. Abdomen soft and tender with active bowel sounds. IV present and infusing without difficulty. Pt denies pain needs at this time, all needs met, will continue to monitor.

## 2019-01-26 NOTE — PROGRESS NOTES
Resting quietly, awake, resp even, unlab, skin warm, dry. AP 80, regular. Abdom soft, tender with active bowel sounds. Reports comfortable. Assessment noted. Discussed PTA cholesterol meds pt not taking. Aware meds to be addressed tomorrow, after nurse discussing with Anamaria Nolasco Pharmacist. No c/o. No distress.

## 2019-01-27 LAB
ANION GAP SERPL CALC-SCNC: 4 MMOL/L
BUN SERPL-MCNC: 12 MG/DL (ref 8–23)
CALCIUM SERPL-MCNC: 8.6 MG/DL (ref 8.3–10.4)
CHLORIDE SERPL-SCNC: 107 MMOL/L (ref 98–107)
CO2 SERPL-SCNC: 30 MMOL/L (ref 21–32)
CREAT SERPL-MCNC: 0.93 MG/DL (ref 0.8–1.5)
ERYTHROCYTE [DISTWIDTH] IN BLOOD BY AUTOMATED COUNT: 12.7 % (ref 11.9–14.6)
GLUCOSE SERPL-MCNC: 131 MG/DL (ref 65–100)
HCT VFR BLD AUTO: 41.1 % (ref 41.1–50.3)
HGB BLD-MCNC: 13.2 G/DL (ref 13.6–17.2)
MCH RBC QN AUTO: 26.7 PG (ref 26.1–32.9)
MCHC RBC AUTO-ENTMCNC: 32.1 G/DL (ref 31.4–35)
MCV RBC AUTO: 83 FL (ref 79.6–97.8)
NRBC # BLD: 0 K/UL (ref 0–0.2)
PLATELET # BLD AUTO: 227 K/UL (ref 150–450)
PMV BLD AUTO: 10.2 FL (ref 9.4–12.3)
POTASSIUM SERPL-SCNC: 4.1 MMOL/L (ref 3.5–5.1)
RBC # BLD AUTO: 4.95 M/UL (ref 4.23–5.6)
SODIUM SERPL-SCNC: 141 MMOL/L (ref 136–145)
WBC # BLD AUTO: 5.3 K/UL (ref 4.3–11.1)

## 2019-01-27 PROCEDURE — 74011250636 HC RX REV CODE- 250/636: Performed by: SURGERY

## 2019-01-27 PROCEDURE — 65270000029 HC RM PRIVATE

## 2019-01-27 PROCEDURE — 36415 COLL VENOUS BLD VENIPUNCTURE: CPT

## 2019-01-27 PROCEDURE — 80048 BASIC METABOLIC PNL TOTAL CA: CPT

## 2019-01-27 PROCEDURE — 85027 COMPLETE CBC AUTOMATED: CPT

## 2019-01-27 RX ADMIN — METRONIDAZOLE 500 MG: 500 INJECTION, SOLUTION INTRAVENOUS at 03:56

## 2019-01-27 RX ADMIN — DEXTROSE MONOHYDRATE, SODIUM CHLORIDE, AND POTASSIUM CHLORIDE 125 ML/HR: 50; 4.5; 1.49 INJECTION, SOLUTION INTRAVENOUS at 19:25

## 2019-01-27 RX ADMIN — DEXTROSE MONOHYDRATE, SODIUM CHLORIDE, AND POTASSIUM CHLORIDE 125 ML/HR: 50; 4.5; 1.49 INJECTION, SOLUTION INTRAVENOUS at 06:44

## 2019-01-27 RX ADMIN — CIPROFLOXACIN 400 MG: 2 INJECTION, SOLUTION INTRAVENOUS at 08:10

## 2019-01-27 RX ADMIN — METRONIDAZOLE 500 MG: 500 INJECTION, SOLUTION INTRAVENOUS at 09:50

## 2019-01-27 RX ADMIN — FAMOTIDINE 20 MG: 10 INJECTION, SOLUTION INTRAVENOUS at 08:10

## 2019-01-27 RX ADMIN — METRONIDAZOLE 500 MG: 500 INJECTION, SOLUTION INTRAVENOUS at 15:28

## 2019-01-27 RX ADMIN — FAMOTIDINE 20 MG: 10 INJECTION, SOLUTION INTRAVENOUS at 21:12

## 2019-01-27 RX ADMIN — ENOXAPARIN SODIUM 40 MG: 40 INJECTION SUBCUTANEOUS at 08:10

## 2019-01-27 RX ADMIN — CIPROFLOXACIN 400 MG: 2 INJECTION, SOLUTION INTRAVENOUS at 21:17

## 2019-01-27 RX ADMIN — METRONIDAZOLE 500 MG: 500 INJECTION, SOLUTION INTRAVENOUS at 22:42

## 2019-01-27 NOTE — PROGRESS NOTES
H&P/Consult Note/Progress Note/Office Note: Rachid Hopson  MRN: [id number]  :1955  Age:63 y.o. 
 
HPI: Rachid Hopson is a 70 y.o. male who reported a 3-4 week h/o progressive, constant, severe LLQ and left groin pain, without radiation. Nothing made the pain better or worse. No associated fever. He went to his primary care doctor office today. WBC was elevated CT scan was obtained as below. He was moved to ER and gen surgery was consulted. 3/27/18 s/p colonoscopy; Dr Mohit Cobian DIAGNOSIS  COLON POLYP: FRAGMENTS OF HYPERPLASTIC POLYP, FOCALLY ACUTELY INFLAMED. Electronically signed out on 3/28/2018 09:48 by NIKITA Osorio M.D.  
 
19 CT abd/pelvis with oral and IV contrast 
Limited evaluation of the lung bases and base of the mediastinum demonstrates no 
significant abnormalities.  
  
The Liver is fatty infiltrated although homogeneous. The spleen is homogeneous 
in attenuation. No contour deforming or enhancing mass lesions are seen of the 
pancreas or adrenal glands. The gallbladder has an unremarkable CT appearance 
without radiopaque stones or pericholecystic fluid/inflammatory changes. The 
kidneys enhance symmetrically and no evidence of hydronephrosis is seen.   
  
The visualized loops of small bowel and colon are normal in caliber. The 
appendix may be partially visualized on image 56. No acutely inflamed structure 
is seen adjacent to the cecum. However, moderate diverticulosis is seen of the 
mid and distal descending colon and throughout the sigmoid colon. Acute 
inflammatory changes are seen in the proximal sigmoid colon best appreciated on 
axial image 77 consistent with acute reticulitis. Small adjacent fluid is seen 
which is likely reactive. No enhancing abscess is seen. However, small free air 
collections are seen in the upper abdomen most evident on axial image 20. This 
suggest perforation.  It should be noted that the appearance is somewhat atypical given that no free air is seen adjacent to the area of acute inflammation in the 
proximal sigmoid colon. No definite additional abnormality is seen to suggest an 
additional etiology. It can only be said that some gas collections are seen 
about the gastric antrum and duodenal bulb although no definite fluid or acute 
changes are seen of the structures to strongly suggest these as a potential 
source of free air. No adenopathy is seen. The abdominal aorta is unremarkable 
in appearance. 
  
CT PELVIS: 
No abnormal pelvic fluid collections or inflammatory changes are present. No 
pelvic adenopathy is seen. The urinary bladder is unremarkable. 
  
IMPRESSION:   
1. Findings consistent with acute diverticulitis at the level of the proximal 
sigmoid colon. No enhancing abscess is seen. However, there is free 
intraperitoneal air in the upper abdomen suggesting perforation. Although this 
most likely results from the acute diverticulitis, the appearance is somewhat 
unusual given that no free air lesions are seen about the inflamed segment of 
sigmoid colon but rather all are seen in the upper abdomen. Some small gas 
collections are seen about the gastroduodenal junction which could suggest this 
as a source of free air although no fluid or focal inflammatory changes are 
clearly appreciated to strongly suggest this. 
  
Additional hx: 
1/25/19 LLQ pain persists; wbc better 1/26/19 LLQ better; HypoK+;  changed LR to maint IVF 
1/27/19 feels better; Tm 99.1F; K+ normal this am; up in chair; still some llq pain Past Medical History:  
Diagnosis Date  Anxiety and depression  Dyslipidemia  Fatty liver  GERD (gastroesophageal reflux disease)  Hyperlipidemia, mixed 12/14/2015  Obesity Past Surgical History:  
Procedure Laterality Date  HX COLONOSCOPY  2006  HX TONSILLECTOMY    
 as a child Current Facility-Administered Medications Medication Dose Route Frequency  dextrose 5% - 0.45% NaCl with KCl 20 mEq/L infusion  125 mL/hr IntraVENous CONTINUOUS  
 metroNIDAZOLE (FLAGYL) IVPB premix 500 mg  500 mg IntraVENous Q6H  
 morphine 10 mg/ml injection 6 mg  6 mg IntraVENous Q1H PRN  
 morphine injection 2 mg  2 mg IntraVENous Q1H PRN  
 morphine injection 4 mg  4 mg IntraVENous Q1H PRN  
 famotidine (PF) (PEPCID) injection 20 mg  20 mg IntraVENous Q12H  
 enoxaparin (LOVENOX) injection 40 mg  40 mg SubCUTAneous Q24H  
 acetaminophen (TYLENOL) tablet 1,000 mg  1,000 mg Oral Q6H PRN  
 ondansetron (ZOFRAN) injection 4 mg  4 mg IntraVENous Q4H PRN  
 ciprofloxacin (CIPRO) 400 mg IVPB (premix)  400 mg IntraVENous Q12H Patient has no known allergies. Social History Socioeconomic History  Marital status:  Spouse name: Not on file  Number of children: Not on file  Years of education: Not on file  Highest education level: Not on file Tobacco Use  Smoking status: Never Smoker  Smokeless tobacco: Never Used Substance and Sexual Activity  Alcohol use: Yes Comment: occassionally Social History Tobacco Use Smoking Status Never Smoker Smokeless Tobacco Never Used Family History Problem Relation Age of Onset  Cancer Mother Leukemia  Cancer Father Colon, Prostate, & squamous cell  Heart Disease Father CHF  Diabetes Father Type II (NIDDM)  Cancer Sister Lung  Cancer Brother Colon ROS: The patient has no difficulty with chest pain or shortness of breath. No fever or chills. Comprehensive review of systems was otherwise unremarkable except as noted above. Physical Exam:  
Visit Vitals /75 (BP 1 Location: Right arm, BP Patient Position: At rest;Supine) Pulse (!) 59 Temp 97.7 °F (36.5 °C) Resp 17 Ht 5' 11\" (1.803 m) Wt 235 lb 9.6 oz (106.9 kg) SpO2 92% BMI 32.86 kg/m² Vitals:  
 01/26/19 2311 01/27/19 5891 01/27/19 9321 01/27/19 7915 BP: 124/75 109/63  111/75 Pulse: 64 (!) 57  (!) 59 Resp: 18 16  17 Temp: 99.1 °F (37.3 °C) 97.9 °F (36.6 °C)  97.7 °F (36.5 °C) SpO2: 95% 92%  92% Weight:   235 lb 9.6 oz (106.9 kg) Height:      
 
01/27 0701 - 01/27 1900 In: 406 [I.V.:406] Out: -  
01/25 1901 - 01/27 0700 In: 56 [I.V.:4660] Out: 5496 [[facility] [place]:[id number]] Constitutional: Alert, oriented, cooperative patient in no acute distress; appears stated age Eyes:Sclera are clear. EOMs intact ENMT: no external lesions gross hearing normal; no obvious neck masses, no ear or lip lesions, nares normal 
CV: RRR. Normal perfusion Resp: No JVD. Breathing is  non-labored; no audible wheezing. GI: soft and non-distended; obese; Less Tender LLQ; No RUQ pain Musculoskeletal: unremarkable with normal function. No embolic signs or cyanosis. Neuro:  Oriented; moves all 4; no focal deficits Psychiatric: normal affect and mood, no memory impairment Recent vitals (if inpt): 
Patient Vitals for the past 24 hrs: 
 BP Temp Pulse Resp SpO2 Weight  
01/27/19 0844 111/75 97.7 °F (36.5 °C) (!) 59 17 92 %   
01/27/19 0647      235 lb 9.6 oz (106.9 kg) 01/27/19 0357 109/63 97.9 °F (36.6 °C) (!) 57 16 92 %   
01/26/19 2311 124/75 99.1 °F (37.3 °C) 64 18 95 %   
01/26/19 1955 130/78 98.8 °F (37.1 °C) 64 16 95 %   
01/26/19 1503 120/74 98.6 °F (37 °C) 64 18 96 %   
01/26/19 1240      239 lb 11.2 oz (108.7 kg) 01/26/19 1133 125/71 98.3 °F (36.8 °C) 70 20   Labs: 
Recent Labs  
  01/27/19 
6063  01/24/19 
2150 WBC 5.3   < >  --   
HGB 13.2*   < >  --   
   < >  --   
   < > 138  
K 4.1   < > 3.6    < > 107 CO2 30   < > 27 BUN 12   < > 11  
CREA 0.93   < > 0.91  
*   < > 101* PTP  --   --  14.2 INR  --   --  1.1 TBILI  --   --  0.5 SGOT  --   --  6* ALT  --   --  21  
AP  --   --  31* LPSE  --   --  131  
 < > = values in this interval not displayed. Lab Results Component Value Date/Time WBC 5.3 01/27/2019 06:24 AM  
 HGB 13.2 (L) 01/27/2019 06:24 AM  
 PLATELET 246 62/23/1953 06:24 AM  
 Sodium 141 01/27/2019 06:24 AM  
 Potassium 4.1 01/27/2019 06:24 AM  
 Chloride 107 01/27/2019 06:24 AM  
 CO2 30 01/27/2019 06:24 AM  
 BUN 12 01/27/2019 06:24 AM  
 Creatinine 0.93 01/27/2019 06:24 AM  
 Glucose 131 (H) 01/27/2019 06:24 AM  
 INR 1.1 01/24/2019 09:50 PM  
 Bilirubin, total 0.5 01/24/2019 09:50 PM  
 AST (SGOT) 6 (L) 01/24/2019 09:50 PM  
 ALT (SGPT) 21 01/24/2019 09:50 PM  
 Alk. phosphatase 31 (L) 01/24/2019 09:50 PM  
 Lipase 131 01/24/2019 09:50 PM  
 
 
CT Results  (Last 48 hours) None I reviewed recent labs, recent radiologic studies, and pertinent records including other doctor notes if needed. I independently reviewed radiology images for studies I described above or studies I have ordered. Admission date (for inpatients): 1/24/2019 * No surgery found *  * No surgery found * ASSESSMENT/PLAN: 
Problem List  Date Reviewed: 1/24/2019 Codes Class Noted LLQ pain ICD-10-CM: R10.32 
ICD-9-CM: 789.04  1/24/2019 Free intraperitoneal air ICD-10-CM: K66.8 ICD-9-CM: 568.89  1/24/2019 * (Principal) Acute diverticulitis ICD-10-CM: G87.32 
ICD-9-CM: 562.11  1/24/2019 Leukocytosis ICD-10-CM: Y04.974 ICD-9-CM: 288.60  1/24/2019 Diverticulitis of colon with perforation ICD-10-CM: K57.20 ICD-9-CM: 562.11  1/24/2019 Hepatic steatosis ICD-10-CM: K76.0 ICD-9-CM: 571.8  1/24/2019 Hyperlipidemia, mixed ICD-10-CM: E78.2 ICD-9-CM: 272.2  12/14/2015 Principal Problem: 
  Acute diverticulitis (1/24/2019) Active Problems: LLQ pain (1/24/2019) Free intraperitoneal air (1/24/2019) Leukocytosis (1/24/2019) Diverticulitis of colon with perforation (1/24/2019) Hepatic steatosis (1/24/2019) Hepatic steatosis Weight Loss Suspected acute sigmoid diverticulitis with free air Symptoms for 1 month prior to admission. He is not septic Continue NPO/bowel rest, IV Abx and observation Repeat CT Monday without contrast 
 
I discussed that if he worsens we could need to proceed with surgery/washout and probably colostomy I discussed the uncertainty form the CT as to the exact location of the perforation based on the radiologists description of the location of free air only being seen in the upper abdomen. Hypokalemia Change LR to D5 1/2NS + 20 KCl at 125 cc/hr K+ 3.5-->4.1 on 1/27/19 I have personally performed a face-to-face diagnostic evaluation and management  service on this patient. I have independently seen the patient. I have independently obtained the above history from the patient/family. I have independently examined the patient with above findings. I have independently reviewed data/labs for this patient and developed the above plan of care (MDM). Mikel Dandy A. Prudencio Cranker, MD, FACS

## 2019-01-27 NOTE — PROGRESS NOTES
Shift assessment complete. Pt resting in bed. A&Ox4. Respirations present, even, unlabored. Lung sounds clear to auscultation. HR regular, S1&S2 auscultated. Abd soft and tender with active bowel sounds in all 4 quadrants. IVF infusing without difficulty. No complaints of pain at this time. Encouraged to call for help when needed. Bed in lowest position, call light within reach, side rails x3. Will continue to monitor throughout the shift.

## 2019-01-27 NOTE — PROGRESS NOTES
Problem: Falls - Risk of 
Goal: *Absence of Falls Document Caitlyn Magaly Fall Risk and appropriate interventions in the flowsheet. Outcome: Progressing Towards Goal 
Fall Risk Interventions: 
  
 
  
 
Medication Interventions: Bed/chair exit alarm

## 2019-01-28 ENCOUNTER — APPOINTMENT (OUTPATIENT)
Dept: CT IMAGING | Age: 64
DRG: 392 | End: 2019-01-28
Attending: SURGERY
Payer: COMMERCIAL

## 2019-01-28 LAB
ANION GAP SERPL CALC-SCNC: 4 MMOL/L
BUN SERPL-MCNC: 11 MG/DL (ref 8–23)
CALCIUM SERPL-MCNC: 9 MG/DL (ref 8.3–10.4)
CHLORIDE SERPL-SCNC: 109 MMOL/L (ref 98–107)
CO2 SERPL-SCNC: 28 MMOL/L (ref 21–32)
CREAT SERPL-MCNC: 0.97 MG/DL (ref 0.8–1.5)
ERYTHROCYTE [DISTWIDTH] IN BLOOD BY AUTOMATED COUNT: 12.6 % (ref 11.9–14.6)
GLUCOSE SERPL-MCNC: 116 MG/DL (ref 65–100)
HCT VFR BLD AUTO: 42.6 % (ref 41.1–50.3)
HGB BLD-MCNC: 13.8 G/DL (ref 13.6–17.2)
MCH RBC QN AUTO: 26.6 PG (ref 26.1–32.9)
MCHC RBC AUTO-ENTMCNC: 32.4 G/DL (ref 31.4–35)
MCV RBC AUTO: 82.2 FL (ref 79.6–97.8)
NRBC # BLD: 0 K/UL (ref 0–0.2)
PLATELET # BLD AUTO: 276 K/UL (ref 150–450)
PMV BLD AUTO: 10.2 FL (ref 9.4–12.3)
POTASSIUM SERPL-SCNC: 4.5 MMOL/L (ref 3.5–5.1)
RBC # BLD AUTO: 5.18 M/UL (ref 4.23–5.6)
SODIUM SERPL-SCNC: 141 MMOL/L (ref 136–145)
WBC # BLD AUTO: 4.8 K/UL (ref 4.3–11.1)

## 2019-01-28 PROCEDURE — 74011250636 HC RX REV CODE- 250/636: Performed by: SURGERY

## 2019-01-28 PROCEDURE — 80048 BASIC METABOLIC PNL TOTAL CA: CPT

## 2019-01-28 PROCEDURE — 65270000029 HC RM PRIVATE

## 2019-01-28 PROCEDURE — 74176 CT ABD & PELVIS W/O CONTRAST: CPT

## 2019-01-28 PROCEDURE — 85027 COMPLETE CBC AUTOMATED: CPT

## 2019-01-28 PROCEDURE — 36415 COLL VENOUS BLD VENIPUNCTURE: CPT

## 2019-01-28 RX ADMIN — CIPROFLOXACIN 400 MG: 2 INJECTION, SOLUTION INTRAVENOUS at 21:01

## 2019-01-28 RX ADMIN — CIPROFLOXACIN 400 MG: 2 INJECTION, SOLUTION INTRAVENOUS at 08:16

## 2019-01-28 RX ADMIN — ENOXAPARIN SODIUM 40 MG: 40 INJECTION SUBCUTANEOUS at 08:16

## 2019-01-28 RX ADMIN — METRONIDAZOLE 500 MG: 500 INJECTION, SOLUTION INTRAVENOUS at 04:39

## 2019-01-28 RX ADMIN — FAMOTIDINE 20 MG: 10 INJECTION, SOLUTION INTRAVENOUS at 21:01

## 2019-01-28 RX ADMIN — METRONIDAZOLE 500 MG: 500 INJECTION, SOLUTION INTRAVENOUS at 10:11

## 2019-01-28 RX ADMIN — DEXTROSE MONOHYDRATE, SODIUM CHLORIDE, AND POTASSIUM CHLORIDE 90 ML/HR: 50; 4.5; 1.49 INJECTION, SOLUTION INTRAVENOUS at 17:54

## 2019-01-28 RX ADMIN — FAMOTIDINE 20 MG: 10 INJECTION, SOLUTION INTRAVENOUS at 08:16

## 2019-01-28 RX ADMIN — METRONIDAZOLE 500 MG: 500 INJECTION, SOLUTION INTRAVENOUS at 22:41

## 2019-01-28 RX ADMIN — METRONIDAZOLE 500 MG: 500 INJECTION, SOLUTION INTRAVENOUS at 15:37

## 2019-01-28 RX ADMIN — DEXTROSE MONOHYDRATE, SODIUM CHLORIDE, AND POTASSIUM CHLORIDE 125 ML/HR: 50; 4.5; 1.49 INJECTION, SOLUTION INTRAVENOUS at 04:43

## 2019-01-28 NOTE — PROGRESS NOTES
Shift assessment complete. Pt resting in bed. A&Ox4. Respirations present, even, unlabored. Lung sounds clear to auscultation. HR regular, S1&S2 auscultated. IVF infusing without difficulty. Pt denies pain at this time. Bed in lowest position, call light within reach, side rails x3. Will continue to monitor throughout the shift.

## 2019-01-28 NOTE — PROGRESS NOTES
Resting quietly, awake, resp even, unlab, skin warm, dry. AP 68, regular, lungs sounds clear. Abdom soft, tender with active bowel sounds. Assessment noted. No needs, no c/o, no distress. Remains NPO.

## 2019-01-28 NOTE — PROGRESS NOTES
H&P/Consult Note/Progress Note/Office Note: Rachid Hopson  MRN: [id number]  :1955  Age:63 y.o. 
 
HPI: Rachid Hopson is a 70 y.o. male who reported a 3-4 week h/o progressive, constant, severe LLQ and left groin pain, without radiation. Nothing made the pain better or worse. No associated fever. He went to his primary care doctor office today. WBC was elevated CT scan was obtained as below. He was moved to ER and gen surgery was consulted. 3/27/18 s/p colonoscopy; Dr Mohit Cobian DIAGNOSIS  COLON POLYP: FRAGMENTS OF HYPERPLASTIC POLYP, FOCALLY ACUTELY INFLAMED. Electronically signed out on 3/28/2018 09:48 by NIKITA Osorio M.D.  
 
 
 
19 CT abd/pelvis with oral and IV contrast 
Limited evaluation of the lung bases and base of the mediastinum demonstrates no 
significant abnormalities.  
  
The Liver is fatty infiltrated although homogeneous. The spleen is homogeneous 
in attenuation. No contour deforming or enhancing mass lesions are seen of the 
pancreas or adrenal glands. The gallbladder has an unremarkable CT appearance 
without radiopaque stones or pericholecystic fluid/inflammatory changes. The 
kidneys enhance symmetrically and no evidence of hydronephrosis is seen.   
  
The visualized loops of small bowel and colon are normal in caliber. The 
appendix may be partially visualized on image 56. No acutely inflamed structure 
is seen adjacent to the cecum. However, moderate diverticulosis is seen of the 
mid and distal descending colon and throughout the sigmoid colon. Acute 
inflammatory changes are seen in the proximal sigmoid colon best appreciated on 
axial image 77 consistent with acute reticulitis. Small adjacent fluid is seen 
which is likely reactive. No enhancing abscess is seen. However, small free air 
collections are seen in the upper abdomen most evident on axial image 20. This 
suggest perforation.  It should be noted that the appearance is somewhat atypical given that no free air is seen adjacent to the area of acute inflammation in the 
proximal sigmoid colon. No definite additional abnormality is seen to suggest an 
additional etiology. It can only be said that some gas collections are seen 
about the gastric antrum and duodenal bulb although no definite fluid or acute 
changes are seen of the structures to strongly suggest these as a potential 
source of free air. No adenopathy is seen. The abdominal aorta is unremarkable 
in appearance. 
  
CT PELVIS: 
No abnormal pelvic fluid collections or inflammatory changes are present. No 
pelvic adenopathy is seen. The urinary bladder is unremarkable. 
  
IMPRESSION:   
1. Findings consistent with acute diverticulitis at the level of the proximal 
sigmoid colon. No enhancing abscess is seen. However, there is free 
intraperitoneal air in the upper abdomen suggesting perforation. Although this 
most likely results from the acute diverticulitis, the appearance is somewhat 
unusual given that no free air lesions are seen about the inflamed segment of 
sigmoid colon but rather all are seen in the upper abdomen. Some small gas 
collections are seen about the gastroduodenal junction which could suggest this 
as a source of free air although no fluid or focal inflammatory changes are 
clearly appreciated to strongly suggest this. 
  
 
 
1/28/19 CT abd/pelvis Previously demonstrated free intraperitoneal air is no longer visible. Inflammatory stranding is present in the pericolonic fat of the left lower 
quadrant (image 68). There are multiple diverticula. Enteric contrast is present 
throughout the colon and rectum. 
  
Evaluation of the abdominal viscera is suboptimal without IV contrast. 
Vicariously excreted contrast is present within a distended gallbladder. There 
are no new focal hepatic lesions.  The unenhanced appearance of the pancreas, 
spleen, adrenal glands, and kidneys is normal. There is no inflammation in the right lower quadrant. 
  
PELVIS: The bladder is poorly distended. There is no free pelvic fluid. There 
are no aggressive osseous lesions. IMPRESSION: Mild residual pericolonic inflammation in the left lower quadrant 
from diverticulitis. Interval resorption of free intraperitoneal air. Consider 
routine follow-up endoscopy for further assessment of the bowel mucosa following 
treatment. Additional hx: 
1/25/19 LLQ pain persists; wbc better 1/26/19 LLQ better; HypoK+;  changed LR to maint IVF 
1/27/19 feels better; Tm 99.1F; K+ normal this am; up in chair; still some llq pain 1/28/19 feels better; CT much improved; start clears; possibly home tomorrow Past Medical History:  
Diagnosis Date  Anxiety and depression  Dyslipidemia  Fatty liver  GERD (gastroesophageal reflux disease)  Hyperlipidemia, mixed 12/14/2015  Obesity Past Surgical History:  
Procedure Laterality Date  HX COLONOSCOPY  2006  HX TONSILLECTOMY    
 as a child Current Facility-Administered Medications Medication Dose Route Frequency  dextrose 5% - 0.45% NaCl with KCl 20 mEq/L infusion  125 mL/hr IntraVENous CONTINUOUS  
 metroNIDAZOLE (FLAGYL) IVPB premix 500 mg  500 mg IntraVENous Q6H  
 morphine 10 mg/ml injection 6 mg  6 mg IntraVENous Q1H PRN  
 morphine injection 2 mg  2 mg IntraVENous Q1H PRN  
 morphine injection 4 mg  4 mg IntraVENous Q1H PRN  
 famotidine (PF) (PEPCID) injection 20 mg  20 mg IntraVENous Q12H  
 enoxaparin (LOVENOX) injection 40 mg  40 mg SubCUTAneous Q24H  
 acetaminophen (TYLENOL) tablet 1,000 mg  1,000 mg Oral Q6H PRN  
 ondansetron (ZOFRAN) injection 4 mg  4 mg IntraVENous Q4H PRN  
 ciprofloxacin (CIPRO) 400 mg IVPB (premix)  400 mg IntraVENous Q12H Patient has no known allergies. Social History Socioeconomic History  Marital status:  Spouse name: Not on file  Number of children: Not on file  Years of education: Not on file  
 Highest education level: Not on file Tobacco Use  Smoking status: Never Smoker  Smokeless tobacco: Never Used Substance and Sexual Activity  Alcohol use: Yes Comment: occassionally Social History Tobacco Use Smoking Status Never Smoker Smokeless Tobacco Never Used Family History Problem Relation Age of Onset  Cancer Mother Leukemia  Cancer Father Colon, Prostate, & squamous cell  Heart Disease Father CHF  Diabetes Father Type II (NIDDM)  Cancer Sister Lung  Cancer Brother Colon ROS: The patient has no difficulty with chest pain or shortness of breath. No fever or chills. Comprehensive review of systems was otherwise unremarkable except as noted above. Physical Exam:  
Visit Vitals /82 (BP 1 Location: Left arm, BP Patient Position: At rest;Head of bed elevated (Comment degrees)) Pulse 60 Temp 97 °F (36.1 °C) Resp 18 Ht 5' 11\" (1.803 m) Wt 235 lb 9.6 oz (106.9 kg) SpO2 95% BMI 32.86 kg/m² Vitals:  
 01/27/19 2006 01/27/19 2338 01/28/19 6915 01/28/19 0321 BP: 129/74 119/74 121/73 137/82 Pulse: 60 (!) 55 (!) 56 60 Resp: 16 16 18 18 Temp: 97.7 °F (36.5 °C) 97.4 °F (36.3 °C) 97 °F (36.1 °C) 97 °F (36.1 °C) SpO2: 93% 93% 93% 95% Weight:      
Height:      
 
01/28 0701 - 01/28 1900 In: 579 [I.V.:579] Out: 600 [Urine:600] 01/26 1901 - 01/28 0700 In: 3344 [I.V.:5813] Out: 3500 [Urine:3500] Constitutional: Alert, oriented, cooperative patient in no acute distress; appears stated age Eyes:Sclera are clear. EOMs intact ENMT: no external lesions gross hearing normal; no obvious neck masses, no ear or lip lesions, nares normal 
CV: RRR. Normal perfusion Resp: No JVD. Breathing is  non-labored; no audible wheezing. GI: soft and non-distended; obese; Less Tender LLQ; No RUQ pain Musculoskeletal: unremarkable with normal function.  No embolic signs or cyanosis. Neuro:  Oriented; moves all 4; no focal deficits Psychiatric: normal affect and mood, no memory impairment Recent vitals (if inpt): 
Patient Vitals for the past 24 hrs: 
 BP Temp Pulse Resp SpO2  
01/28/19 0723 137/82 97 °F (36.1 °C) 60 18 95 % 01/28/19 0328 121/73 97 °F (36.1 °C) (!) 56 18 93 % 01/27/19 2338 119/74 97.4 °F (36.3 °C) (!) 55 16 93 % 01/27/19 2006 129/74 97.7 °F (36.5 °C) 60 16 93 % 01/27/19 1722 109/84 97.7 °F (36.5 °C) 70 18 92 % 01/27/19 1229 108/68 98 °F (36.7 °C) (!) 57 18 94 % Labs: 
Recent Labs  
  01/28/19 
3797 WBC 4.8 HGB 13.8    
K 4.5  
* CO2 28 BUN 11  
CREA 0.97 * Lab Results Component Value Date/Time WBC 4.8 01/28/2019 05:58 AM  
 HGB 13.8 01/28/2019 05:58 AM  
 PLATELET 415 23/73/3611 05:58 AM  
 Sodium 141 01/28/2019 05:58 AM  
 Potassium 4.5 01/28/2019 05:58 AM  
 Chloride 109 (H) 01/28/2019 05:58 AM  
 CO2 28 01/28/2019 05:58 AM  
 BUN 11 01/28/2019 05:58 AM  
 Creatinine 0.97 01/28/2019 05:58 AM  
 Glucose 116 (H) 01/28/2019 05:58 AM  
 INR 1.1 01/24/2019 09:50 PM  
 Bilirubin, total 0.5 01/24/2019 09:50 PM  
 AST (SGOT) 6 (L) 01/24/2019 09:50 PM  
 ALT (SGPT) 21 01/24/2019 09:50 PM  
 Alk. phosphatase 31 (L) 01/24/2019 09:50 PM  
 Lipase 131 01/24/2019 09:50 PM  
 
 
CT Results  (Last 48 hours) 01/28/19 0832  CT ABD PELV WO CONT Final result Impression:  IMPRESSION: Mild residual pericolonic inflammation in the left lower quadrant  
from diverticulitis. Interval resorption of free intraperitoneal air. Consider  
routine follow-up endoscopy for further assessment of the bowel mucosa following  
treatment. Narrative:  CT ABDOMEN AND PELVIS WITHOUT CONTRAST 1/28/2019 HISTORY: Suspected sigmoid diverticulitis. Evaluate for interval change. TECHNIQUE: Noncontrast axial images were obtained through the abdomen and  
pelvis. Coronal reformatted images were generated. All CT scans at this  
facility used dose modulation, iterative reconstruction and/or weight based  
dosing when appropriate to reduce radiation dose to as low as reasonably  
achievable. COMPARISON: 1/20/2014 FINDINGS: Included portions of the lung bases demonstrate minimal atelectasis or  
scarring in the left lung base. ABDOMEN: Previously demonstrated free intraperitoneal air is no longer visible. Inflammatory stranding is present in the pericolonic fat of the left lower  
quadrant (image 68). There are multiple diverticula. Enteric contrast is present  
throughout the colon and rectum. Evaluation of the abdominal viscera is suboptimal without IV contrast.  
Vicariously excreted contrast is present within a distended gallbladder. There  
are no new focal hepatic lesions. The unenhanced appearance of the pancreas,  
spleen, adrenal glands, and kidneys is normal. There is no inflammation in the  
right lower quadrant. PELVIS: The bladder is poorly distended. There is no free pelvic fluid. There  
are no aggressive osseous lesions. I reviewed recent labs, recent radiologic studies, and pertinent records including other doctor notes if needed. I independently reviewed radiology images for studies I described above or studies I have ordered. Admission date (for inpatients): 1/24/2019 * No surgery found *  * No surgery found * ASSESSMENT/PLAN: 
Problem List  Date Reviewed: 1/24/2019 Codes Class Noted LLQ pain ICD-10-CM: R10.32 
ICD-9-CM: 789.04  1/24/2019 Free intraperitoneal air ICD-10-CM: K66.8 ICD-9-CM: 568.89  1/24/2019 * (Principal) Acute diverticulitis ICD-10-CM: H94.62 
ICD-9-CM: 562.11  1/24/2019 Leukocytosis ICD-10-CM: I64.147 ICD-9-CM: 288.60  1/24/2019 Diverticulitis of colon with perforation ICD-10-CM: K57.20 ICD-9-CM: 562.11  1/24/2019 Hepatic steatosis ICD-10-CM: K76.0 ICD-9-CM: 571.8  1/24/2019 Hyperlipidemia, mixed ICD-10-CM: E78.2 ICD-9-CM: 272.2  12/14/2015 Principal Problem: 
  Acute diverticulitis (1/24/2019) Active Problems: LLQ pain (1/24/2019) Free intraperitoneal air (1/24/2019) Leukocytosis (1/24/2019) Diverticulitis of colon with perforation (1/24/2019) Hepatic steatosis (1/24/2019) Hepatic steatosis Weight Loss Suspected acute sigmoid diverticulitis with free air initially Repeat CT today much better Cont IV Abx Trial of clears Possible home on PO abx tomorrow Hypokalemia Change LR to D5 1/2NS + 20 KCl at 125 cc/hr K+ 3.5-->4.1 on 1/27/19 I have personally performed a face-to-face diagnostic evaluation and management  service on this patient. I have independently seen the patient. I have independently obtained the above history from the patient/family. I have independently examined the patient with above findings. I have independently reviewed data/labs for this patient and developed the above plan of care (MDM). Diana Alas MD, FACS

## 2019-01-28 NOTE — PROGRESS NOTES
Problem: Falls - Risk of 
Goal: *Absence of Falls Document Koki Wells Fall Risk and appropriate interventions in the flowsheet. Outcome: Progressing Towards Goal 
Fall Risk Interventions: 
  
 
  
 
Medication Interventions: Bed/chair exit alarm

## 2019-01-29 VITALS
RESPIRATION RATE: 18 BRPM | HEIGHT: 71 IN | HEART RATE: 60 BPM | DIASTOLIC BLOOD PRESSURE: 78 MMHG | TEMPERATURE: 98.3 F | WEIGHT: 232.6 LBS | OXYGEN SATURATION: 95 % | BODY MASS INDEX: 32.56 KG/M2 | SYSTOLIC BLOOD PRESSURE: 136 MMHG

## 2019-01-29 PROCEDURE — 74011250636 HC RX REV CODE- 250/636: Performed by: SURGERY

## 2019-01-29 RX ADMIN — CIPROFLOXACIN 400 MG: 2 INJECTION, SOLUTION INTRAVENOUS at 07:48

## 2019-01-29 RX ADMIN — METRONIDAZOLE 500 MG: 500 INJECTION, SOLUTION INTRAVENOUS at 03:48

## 2019-01-29 RX ADMIN — FAMOTIDINE 20 MG: 10 INJECTION, SOLUTION INTRAVENOUS at 07:48

## 2019-01-29 NOTE — DISCHARGE SUMMARY
[address], [address] [facility]  [phone]   Discharge Summary     Alan Cash  MRN: [id number]     : 1955     Age: 70 y.o. Admit date: 2019     Discharge date:  19  Attending Physician: Anselmo Green MD, MD, FACS  Primary Discharge Diagnosis:   Principal Problem:    Acute diverticulitis (2019)    Active Problems:    LLQ pain (2019)      Free intraperitoneal air (2019)      Leukocytosis (2019)      Diverticulitis of colon with perforation (2019)      Hepatic steatosis (2019)      Primary Operations or Procedures Performed :  * No surgery found *     Brief History and Reason for Admission: Alan Cash was admitted with the following history of present illness. HPI: Alan Cash is a 70 y.o. male who reported a 3-4 week h/o progressive, constant, severe LLQ and left groin pain, without radiation. Nothing made the pain better or worse. No associated fever. He went to his primary care doctor office today. WBC was elevated. CT scan was obtained as below. He was moved to ER and gen surgery was consulted.        3/27/18 s/p colonoscopy; Dr Trung Mas: Nabeel Ricardo, FOCALLY ACUTELY INFLAMED. Electronically signed out on 3/28/2018 09:48 by NIKITA Luke M.D.            19 CT abd/pelvis with oral and IV contrast  Limited evaluation of the lung bases and base of the mediastinum demonstrates no  significant abnormalities.      The Liver is fatty infiltrated although homogeneous.  The spleen is homogeneous  in attenuation.  No contour deforming or enhancing mass lesions are seen of the  pancreas or adrenal glands.  The gallbladder has an unremarkable CT appearance  without radiopaque stones or pericholecystic fluid/inflammatory changes.  The  kidneys enhance symmetrically and no evidence of hydronephrosis is seen.       The visualized loops of small bowel and colon are normal in caliber.  The  appendix may be partially visualized on image 56. No acutely inflamed structure  is seen adjacent to the cecum. However, moderate diverticulosis is seen of the  mid and distal descending colon and throughout the sigmoid colon. Acute  inflammatory changes are seen in the proximal sigmoid colon best appreciated on  axial image 77 consistent with acute reticulitis. Small adjacent fluid is seen  which is likely reactive. No enhancing abscess is seen. However, small free air  collections are seen in the upper abdomen most evident on axial image 20. This  suggest perforation. It should be noted that the appearance is somewhat atypical  given that no free air is seen adjacent to the area of acute inflammation in the  proximal sigmoid colon. No definite additional abnormality is seen to suggest an  additional etiology. It can only be said that some gas collections are seen  about the gastric antrum and duodenal bulb although no definite fluid or acute  changes are seen of the structures to strongly suggest these as a potential  source of free air.  No adenopathy is seen.  The abdominal aorta is unremarkable  in appearance.     CT PELVIS:  No abnormal pelvic fluid collections or inflammatory changes are present.  No  pelvic adenopathy is seen.  The urinary bladder is unremarkable.     IMPRESSION:    1.  Findings consistent with acute diverticulitis at the level of the proximal  sigmoid colon. No enhancing abscess is seen. However, there is free  intraperitoneal air in the upper abdomen suggesting perforation. Although this  most likely results from the acute diverticulitis, the appearance is somewhat  unusual given that no free air lesions are seen about the inflamed segment of  sigmoid colon but rather all are seen in the upper abdomen.  Some small gas  collections are seen about the gastroduodenal junction which could suggest this  as a source of free air although no fluid or focal inflammatory changes are  clearly appreciated to strongly suggest this.           1/28/19 CT abd/pelvis  Previously demonstrated free intraperitoneal air is no longer visible. Inflammatory stranding is present in the pericolonic fat of the left lower  quadrant (image 68). There are multiple diverticula. Enteric contrast is present  throughout the colon and rectum.     Evaluation of the abdominal viscera is suboptimal without IV contrast.  Vicariously excreted contrast is present within a distended gallbladder. There  are no new focal hepatic lesions. The unenhanced appearance of the pancreas,  spleen, adrenal glands, and kidneys is normal. There is no inflammation in the  right lower quadrant.     PELVIS: The bladder is poorly distended. There is no free pelvic fluid. There  are no aggressive osseous lesions.     IMPRESSION: Mild residual pericolonic inflammation in the left lower quadrant  from diverticulitis. Interval resorption of free intraperitoneal air. Consider  routine follow-up endoscopy for further assessment of the bowel mucosa following  treatment.  [facility] Course:      1/25/19 LLQ pain persists; wbc better  1/26/19 LLQ better; HypoK+;  changed LR to maint IVF  1/27/19 feels better; Tm 99.1F; K+ normal this am; up in chair; still some llq pain  1/28/19 feels better; CT much improved; start clears; possibly home tomorrow  1/29/19 tolerating PO; minimal to no pain; No fevers; vital and urine output ok;  Home today       Condition at Discharge: good        Discharge Medications:   Current Discharge Medication List      CONTINUE these medications which have NOT CHANGED    Details   fenofibric acid (TRILIPIX) 135 mg capsule Take 1 Cap by mouth daily. Qty: 90 Cap, Refills: 3    Associated Diagnoses: Hyperlipidemia, mixed      simvastatin (ZOCOR) 20 mg tablet Take 1 Tab by mouth nightly.   Qty: 90 Tab, Refills: 3    Associated Diagnoses: Hyperlipidemia, mixed      sildenafil citrate (VIAGRA) 50 mg tablet Take one tablet one hour before intercourse  Qty: 9 Tab, Refills: 11    Associated Diagnoses: Impotence      aspirin delayed-release 81 mg tablet Take 81 mg by mouth daily. naproxen (NAPROSYN) 500 mg tablet Take 1 Tab by mouth two (2) times daily (with meals).   Qty: 28 Tab, Refills: 0    Associated Diagnoses: Bilateral shoulder pain, unspecified chronicity               Disposition/Discharge Instructions/Follow-up Care:      2 Different Antibiotic prescriptions (Ciprofloxacin and Flagyl)   are on chart for patient to use for 2 weeks at home    Follow-up with Dr Prudencio Cranker in 9 days on a Thursday in the office at:  [address], [address]  (Call for an appt time-->[phone]-->option 1)    Diet: Soups, Juices, and Liquids for 2 days,   and then Soft foods until follow-up        Signed:  Kiesha Parmar MD, FACS   1/29/2019  6:04 AM  E/M service time 35min

## 2019-01-29 NOTE — PROGRESS NOTES
Shift assessment complete. Patient A&O x 4. Respirations present, even and unlabored. Breath sounds clear. Patient on room air. Heart sounds regular. Bowel sounds active in all 4 quadrants. Abdomen soft and tender. IV infusing without difficulty. SCD's in place. Family at bedside. Patient needs met. No distress noted. Bed low and locked. Call light within reach. Will continue to monitor hourly during shift.

## 2019-01-29 NOTE — DISCHARGE INSTRUCTIONS
2 Different Antibiotic prescriptions (Ciprofloxacin and Flagyl)   are on chart for patient to use for 2 weeks at home    Follow-up with Dr Kasey Kern in 9 days on a Thursday in the office at:  [address], [address]  (Call for an appt time-->[phone]-->option 1)    Diet: Soups, Juices, and Liquids for 2 days, and then Soft foods until follow-up      Patient Education        Diverticulitis: Care Instructions  Your Care Instructions    Diverticulitis occurs when pouches form in the wall of the colon and become inflamed or infected. It can be very painful. Doctors aren't sure what causes diverticulitis. There is no proof that foods such as nuts, seeds, or berries cause it or make it worse. A low-fiber diet may cause the colon to work harder to push stool forward. Pouches may form because of this extra work. It may be hard to think about healthy eating while you're in pain. But as you recover, you might think about how you can use healthy eating for overall better health. Healthy eating may help you avoid future attacks. Follow-up care is a key part of your treatment and safety. Be sure to make and go to all appointments, and call your doctor if you are having problems. It's also a good idea to know your test results and keep a list of the medicines you take. How can you care for yourself at home? · Drink plenty of fluids, enough so that your urine is light yellow or clear like water. If you have kidney, heart, or liver disease and have to limit fluids, talk with your doctor before you increase the amount of fluids you drink. · Stick to liquids or a bland diet (plain rice, bananas, dry toast or crackers, applesauce) until you are feeling better. Then you can return to regular foods and gradually increase the amount of fiber in your diet. · Use a heating pad set on low on your belly to relieve mild cramps and pain. · Get extra rest until you are feeling better.   · Be safe with medicines. Read and follow all instructions on the label. ? If the doctor gave you a prescription medicine for pain, take it as prescribed. ? If you are not taking a prescription pain medicine, ask your doctor if you can take an over-the-counter medicine. · If your doctor prescribed antibiotics, take them as directed. Do not stop taking them just because you feel better. You need to take the full course of antibiotics. To prevent future attacks of diverticulitis  · Avoid constipation:  ? Include fruits, vegetables, beans, and whole grains in your diet each day. These foods are high in fiber. ? Drink plenty of fluids, enough so that your urine is light yellow or clear like water. If you have kidney, heart, or liver disease and have to limit fluids, talk with your doctor before you increase the amount of fluids you drink. ? Get some exercise every day. Build up slowly to 30 to 60 minutes a day on 5 or more days of the week. ? Take a fiber supplement, such as Citrucel or Metamucil, every day if needed. Read and follow all instructions on the label. ? Schedule time each day for a bowel movement. Having a daily routine may help. Take your time and do not strain when having a bowel movement. When should you call for help? Call your doctor now or seek immediate medical care if:    · You have a fever.     · You are vomiting.     · You have new or worse belly pain.     · You cannot pass stools or gas.    Watch closely for changes in your health, and be sure to contact your doctor if you have any problems. Where can you learn more? Go to http://denny-saeed.info/. Enter H901 in the search box to learn more about \"Diverticulitis: Care Instructions. \"  Current as of: March 27, 2018  Content Version: 11.9  © 7174-3157 [employer] Care instructions adapted under license by [employer] (which disclaims liability or warranty for this information).  If you have questions about a medical condition or this instruction, always ask your healthcare professional. [address] any warranty or liability for your use of this information. DISCHARGE SUMMARY from Nurse    PATIENT INSTRUCTIONS:    After general anesthesia or intravenous sedation, for 24 hours or while taking prescription Narcotics:  · Limit your activities  · Do not drive and operate hazardous machinery  · Do not make important personal or business decisions  · Do  not drink alcoholic beverages  · If you have not urinated within 8 hours after discharge, please contact your surgeon on call. Report the following to your surgeon:  · Excessive pain, swelling, redness or odor of or around the surgical area  · Temperature over 100.5  · Nausea and vomiting lasting longer than 4 hours or if unable to take medications  · Any signs of decreased circulation or nerve impairment to extremity: change in color, persistent  numbness, tingling, coldness or increase pain  · Any questions    What to do at Home:  Recommended activity: Activity as tolerated, see above    If you experience any of the following symptoms severe abdominal pain, nausea/vomiting, fever, other concerns, please follow up with Dr Geovanna Bland. *  Please give a list of your current medications to your Primary Care Provider. *  Please update this list whenever your medications are discontinued, doses are      changed, or new medications (including over-the-counter products) are added. *  Please carry medication information at all times in case of emergency situations. These are general instructions for a healthy lifestyle:    No smoking/ No tobacco products/ Avoid exposure to second hand smoke  Surgeon General's Warning:  Quitting smoking now greatly reduces serious risk to your health.     Obesity, smoking, and sedentary lifestyle greatly increases your risk for illness    A healthy diet, regular physical exercise & weight monitoring are important for maintaining a healthy lifestyle    You may be retaining fluid if you have a history of heart failure or if you experience any of the following symptoms:  Weight gain of 3 pounds or more overnight or 5 pounds in a week, increased swelling in our hands or feet or shortness of breath while lying flat in bed. Please call your doctor as soon as you notice any of these symptoms; do not wait until your next office visit. Recognize signs and symptoms of STROKE:    F-face looks uneven    A-arms unable to move or move unevenly    S-speech slurred or non-existent    T-time-call 911 as soon as signs and symptoms begin-DO NOT go       Back to bed or wait to see if you get better-TIME IS BRAIN. Warning Signs of HEART ATTACK     Call 911 if you have these symptoms:   Chest discomfort. Most heart attacks involve discomfort in the center of the chest that lasts more than a few minutes, or that goes away and comes back. It can feel like uncomfortable pressure, squeezing, fullness, or pain.  Discomfort in other areas of the upper body. Symptoms can include pain or discomfort in one or both arms, the back, neck, jaw, or stomach.  Shortness of breath with or without chest discomfort.  Other signs may include breaking out in a cold sweat, nausea, or lightheadedness. Don't wait more than five minutes to call 911 - MINUTES MATTER! Fast action can save your life. Calling 911 is almost always the fastest way to get lifesaving treatment. Emergency Medical Services staff can begin treatment when they arrive -- up to an hour sooner than if someone gets to the hospital by car. The discharge information has been reviewed with the patient. The patient verbalized understanding.   Discharge medications reviewed with the patient and appropriate educational materials and side effects teaching were provided. ___________________________________________________________________________________________________________________________________    Patient Education        Low-Fiber Diet: Care Instructions  Your Care Instructions    When your bowels are irritated, you may need to limit fiber in your diet until the problem clears up. Your doctor and dietitian can help you design a low-fiber diet based on your health and what you prefer to eat. Ask your doctor how long you should stay on a low-fiber diet. Your doctor probably will have you start adding more fiber to your diet as you get better. Always talk with your doctor or dietitian before you make changes in your diet. Follow-up care is a key part of your treatment and safety. Be sure to make and go to all appointments, and call your doctor if you are having problems. It's also a good idea to know your test results and keep a list of the medicines you take. How can you care for yourself at home? · Choose white or refined grains, and avoid whole grains. That means eating white or refined cereals, breads, crackers, rice, or pasta. · Peel the skin from fruits and vegetables before you eat or cook them. Avoid eating skins, seeds, and hulls. ? Eat frozen or canned fruit. Low-fiber fruits include applesauce, ripe bananas, and fruit juice without pulp. ? Eat low-fiber vegetables, which include well-cooked vegetables and vegetable juice. · Drink or eat milk, yogurt, or other milk products, if you can digest dairy without too many problems. Your doctor may limit milk and milk products for a while. If so, he or she may recommend a calcium and vitamin D supplement. · Eat well-cooked meat, such as chicken, turkey, fish, or lean meat. You also can eat eggs and tofu. · Avoid these foods:  ? Bran, brown or wild rice, oatmeal, granola, corn, chantel crackers, barley, and whole wheat and other whole-grain breads, such as rye bread  ?  Cereals with more than 3 grams of fiber a serving  ? Berries, rhubarb, prunes, prune juice, and all dried fruits  ? Raw vegetables  ? Cabbage, broccoli, brussels sprouts, and cauliflower  ? Cooked dried beans, lentils, and split peas  ? Crunchy peanut butter  ? Ice cream with fruit pieces in it  ? Coconut, nuts, popcorn, raisins, and seeds, or any ice cream, yogurt, or cheese with these in them  Where can you learn more? Go to http://denny-saeed.info/. Enter G949 in the search box to learn more about \"Low-Fiber Diet: Care Instructions. \"  Current as of: March 28, 2018  Content Version: 11.9  © 6944-9440 [employer]. Care instructions adapted under license by [employer] (which disclaims liability or warranty for this information). If you have questions about a medical condition or this instruction, always ask your healthcare professional. [place] [address] any warranty or liability for your use of this information.

## 2019-02-10 ENCOUNTER — APPOINTMENT (OUTPATIENT)
Dept: CT IMAGING | Age: 64
DRG: 392 | End: 2019-02-10
Attending: EMERGENCY MEDICINE
Payer: COMMERCIAL

## 2019-02-10 ENCOUNTER — HOSPITAL ENCOUNTER (INPATIENT)
Age: 64
LOS: 5 days | Discharge: HOME HEALTH | DRG: 392 | End: 2019-02-15
Attending: EMERGENCY MEDICINE | Admitting: SURGERY
Payer: COMMERCIAL

## 2019-02-10 DIAGNOSIS — K57.20 DIVERTICULITIS OF LARGE INTESTINE WITH PERFORATION WITHOUT ABSCESS, UNSPECIFIED BLEEDING STATUS: Primary | ICD-10-CM

## 2019-02-10 DIAGNOSIS — K57.20 DIVERTICULITIS OF LARGE INTESTINE WITH PERFORATION WITHOUT BLEEDING: ICD-10-CM

## 2019-02-10 DIAGNOSIS — R10.32 LLQ PAIN: ICD-10-CM

## 2019-02-10 DIAGNOSIS — K57.92 ACUTE DIVERTICULITIS: ICD-10-CM

## 2019-02-10 DIAGNOSIS — K66.8 FREE INTRAPERITONEAL AIR: ICD-10-CM

## 2019-02-10 LAB
ALBUMIN SERPL-MCNC: 4.1 G/DL (ref 3.2–4.6)
ALBUMIN/GLOB SERPL: 1.3 {RATIO}
ALP SERPL-CCNC: 33 U/L (ref 50–136)
ALT SERPL-CCNC: 25 U/L (ref 12–65)
ANION GAP SERPL CALC-SCNC: 7 MMOL/L
AST SERPL-CCNC: 16 U/L (ref 15–37)
BASOPHILS # BLD: 0.1 K/UL (ref 0–0.2)
BASOPHILS NFR BLD: 1 % (ref 0–2)
BILIRUB SERPL-MCNC: 0.7 MG/DL (ref 0.2–1.1)
BUN SERPL-MCNC: 11 MG/DL (ref 8–23)
CALCIUM SERPL-MCNC: 9.1 MG/DL (ref 8.3–10.4)
CHLORIDE SERPL-SCNC: 107 MMOL/L (ref 98–107)
CO2 SERPL-SCNC: 27 MMOL/L (ref 21–32)
CREAT SERPL-MCNC: 0.97 MG/DL (ref 0.8–1.5)
DIFFERENTIAL METHOD BLD: ABNORMAL
EOSINOPHIL # BLD: 0.1 K/UL (ref 0–0.8)
EOSINOPHIL NFR BLD: 1 % (ref 0.5–7.8)
ERYTHROCYTE [DISTWIDTH] IN BLOOD BY AUTOMATED COUNT: 13.1 % (ref 11.9–14.6)
GLOBULIN SER CALC-MCNC: 3.2 G/DL (ref 2.3–3.5)
GLUCOSE SERPL-MCNC: 111 MG/DL (ref 65–100)
HCT VFR BLD AUTO: 47.4 % (ref 41.1–50.3)
HGB BLD-MCNC: 15.5 G/DL (ref 13.6–17.2)
IMM GRANULOCYTES # BLD AUTO: 0 K/UL (ref 0–0.5)
IMM GRANULOCYTES NFR BLD AUTO: 0 % (ref 0–5)
LIPASE SERPL-CCNC: 196 U/L (ref 73–393)
LYMPHOCYTES # BLD: 1.7 K/UL (ref 0.5–4.6)
LYMPHOCYTES NFR BLD: 18 % (ref 13–44)
MCH RBC QN AUTO: 26.6 PG (ref 26.1–32.9)
MCHC RBC AUTO-ENTMCNC: 32.7 G/DL (ref 31.4–35)
MCV RBC AUTO: 81.4 FL (ref 79.6–97.8)
MONOCYTES # BLD: 0.8 K/UL (ref 0.1–1.3)
MONOCYTES NFR BLD: 8 % (ref 4–12)
NEUTS SEG # BLD: 7 K/UL (ref 1.7–8.2)
NEUTS SEG NFR BLD: 73 % (ref 43–78)
NRBC # BLD: 0 K/UL (ref 0–0.2)
PLATELET # BLD AUTO: 304 K/UL (ref 150–450)
PMV BLD AUTO: 10.5 FL (ref 9.4–12.3)
POTASSIUM SERPL-SCNC: 3.9 MMOL/L (ref 3.5–5.1)
PROT SERPL-MCNC: 7.3 G/DL
RBC # BLD AUTO: 5.82 M/UL (ref 4.23–5.6)
SODIUM SERPL-SCNC: 141 MMOL/L (ref 136–145)
WBC # BLD AUTO: 9.6 K/UL (ref 4.3–11.1)

## 2019-02-10 PROCEDURE — 65270000029 HC RM PRIVATE

## 2019-02-10 PROCEDURE — 83690 ASSAY OF LIPASE: CPT

## 2019-02-10 PROCEDURE — 99284 EMERGENCY DEPT VISIT MOD MDM: CPT | Performed by: EMERGENCY MEDICINE

## 2019-02-10 PROCEDURE — 99222 1ST HOSP IP/OBS MODERATE 55: CPT | Performed by: SURGERY

## 2019-02-10 PROCEDURE — 85025 COMPLETE CBC W/AUTO DIFF WBC: CPT

## 2019-02-10 PROCEDURE — 74011000258 HC RX REV CODE- 258: Performed by: EMERGENCY MEDICINE

## 2019-02-10 PROCEDURE — 74011636320 HC RX REV CODE- 636/320: Performed by: EMERGENCY MEDICINE

## 2019-02-10 PROCEDURE — 74011250636 HC RX REV CODE- 250/636: Performed by: EMERGENCY MEDICINE

## 2019-02-10 PROCEDURE — 81003 URINALYSIS AUTO W/O SCOPE: CPT | Performed by: EMERGENCY MEDICINE

## 2019-02-10 PROCEDURE — 74011250636 HC RX REV CODE- 250/636: Performed by: SURGERY

## 2019-02-10 PROCEDURE — 80053 COMPREHEN METABOLIC PANEL: CPT

## 2019-02-10 PROCEDURE — 74177 CT ABD & PELVIS W/CONTRAST: CPT

## 2019-02-10 PROCEDURE — 96360 HYDRATION IV INFUSION INIT: CPT | Performed by: EMERGENCY MEDICINE

## 2019-02-10 RX ORDER — SODIUM CHLORIDE 0.9 % (FLUSH) 0.9 %
10 SYRINGE (ML) INJECTION
Status: COMPLETED | OUTPATIENT
Start: 2019-02-10 | End: 2019-02-10

## 2019-02-10 RX ORDER — CIPROFLOXACIN 2 MG/ML
400 INJECTION, SOLUTION INTRAVENOUS EVERY 12 HOURS
Status: DISCONTINUED | OUTPATIENT
Start: 2019-02-10 | End: 2019-02-15 | Stop reason: HOSPADM

## 2019-02-10 RX ORDER — SODIUM CHLORIDE 9 MG/ML
50 INJECTION, SOLUTION INTRAVENOUS CONTINUOUS
Status: DISCONTINUED | OUTPATIENT
Start: 2019-02-10 | End: 2019-02-15 | Stop reason: HOSPADM

## 2019-02-10 RX ORDER — MORPHINE SULFATE 4 MG/ML
2 INJECTION INTRAVENOUS
Status: DISCONTINUED | OUTPATIENT
Start: 2019-02-10 | End: 2019-02-11

## 2019-02-10 RX ORDER — METRONIDAZOLE 500 MG/100ML
500 INJECTION, SOLUTION INTRAVENOUS EVERY 12 HOURS
Status: DISCONTINUED | OUTPATIENT
Start: 2019-02-10 | End: 2019-02-15 | Stop reason: HOSPADM

## 2019-02-10 RX ORDER — HEPARIN SODIUM 5000 [USP'U]/ML
5000 INJECTION, SOLUTION INTRAVENOUS; SUBCUTANEOUS EVERY 8 HOURS
Status: DISCONTINUED | OUTPATIENT
Start: 2019-02-10 | End: 2019-02-15 | Stop reason: HOSPADM

## 2019-02-10 RX ORDER — ONDANSETRON 2 MG/ML
4 INJECTION INTRAMUSCULAR; INTRAVENOUS
Status: DISCONTINUED | OUTPATIENT
Start: 2019-02-10 | End: 2019-02-15 | Stop reason: HOSPADM

## 2019-02-10 RX ORDER — SODIUM CHLORIDE 0.9 % (FLUSH) 0.9 %
5-40 SYRINGE (ML) INJECTION AS NEEDED
Status: DISCONTINUED | OUTPATIENT
Start: 2019-02-10 | End: 2019-02-15 | Stop reason: HOSPADM

## 2019-02-10 RX ORDER — SODIUM CHLORIDE 0.9 % (FLUSH) 0.9 %
5-40 SYRINGE (ML) INJECTION EVERY 8 HOURS
Status: DISCONTINUED | OUTPATIENT
Start: 2019-02-10 | End: 2019-02-15 | Stop reason: HOSPADM

## 2019-02-10 RX ORDER — METRONIDAZOLE 500 MG/100ML
500 INJECTION, SOLUTION INTRAVENOUS EVERY 8 HOURS
Status: DISCONTINUED | OUTPATIENT
Start: 2019-02-10 | End: 2019-02-10 | Stop reason: SDUPTHER

## 2019-02-10 RX ADMIN — METRONIDAZOLE 500 MG: 500 INJECTION, SOLUTION INTRAVENOUS at 18:08

## 2019-02-10 RX ADMIN — Medication 10 ML: at 13:53

## 2019-02-10 RX ADMIN — SODIUM CHLORIDE 100 ML/HR: 900 INJECTION, SOLUTION INTRAVENOUS at 16:10

## 2019-02-10 RX ADMIN — Medication 10 ML: at 22:09

## 2019-02-10 RX ADMIN — SODIUM CHLORIDE 100 ML: 900 INJECTION, SOLUTION INTRAVENOUS at 13:54

## 2019-02-10 RX ADMIN — CIPROFLOXACIN 400 MG: 2 INJECTION, SOLUTION INTRAVENOUS at 16:14

## 2019-02-10 RX ADMIN — MORPHINE SULFATE 2 MG: 4 INJECTION INTRAVENOUS at 22:09

## 2019-02-10 RX ADMIN — HEPARIN SODIUM 5000 UNITS: 5000 INJECTION INTRAVENOUS; SUBCUTANEOUS at 22:09

## 2019-02-10 RX ADMIN — IOPAMIDOL 100 ML: 755 INJECTION, SOLUTION INTRAVENOUS at 13:54

## 2019-02-10 RX ADMIN — HEPARIN SODIUM 5000 UNITS: 5000 INJECTION INTRAVENOUS; SUBCUTANEOUS at 16:39

## 2019-02-10 RX ADMIN — DIATRIZOATE MEGLUMINE AND DIATRIZOATE SODIUM 15 ML: 660; 100 LIQUID ORAL; RECTAL at 12:35

## 2019-02-10 RX ADMIN — SODIUM CHLORIDE 1000 ML: 900 INJECTION, SOLUTION INTRAVENOUS at 12:36

## 2019-02-10 NOTE — PROGRESS NOTES
Patient arrived to floor alert and oriented and wife at bedside. States he had a soft BM today and is voiding without difficulty. States his pain =4/10 in the left lower abd. Oriented to room, call light within reach, side rails up x2 and instructed to call for assistance to the bathroom. Voiced understanding.

## 2019-02-10 NOTE — PROGRESS NOTES
02/10/19 1700 Dual Skin Pressure Injury Assessment Dual Skin Pressure Injury Assessment WDL Second Care Provider (Based on [address] [place] [address]) Aleah Carlson Skin Integumentary Skin Integumentary (WDL) WDL Pressure  Injury Documentation No Pressure Injury Noted-Pressure Ulcer Prevention Initiated Wound Prevention and Protection Methods Orientation of Wound Prevention Posterior Location of Wound Prevention Sacrum/Coccyx Dressing Present  No  
Wound Offloading (Prevention Methods) Bed, pressure reduction mattress;Bed, pressure redistribution/air;Repositioning;Pillows Jose G Scale (11years of age and older) Sensory Perception 4 Moisture 4 Activity 4 Mobility 4 Nutrition 3 Nutrition Interventions Document food/fluid/supplement intake Friction and Shear 3 Jose G Score 22 Primary Nurse Fox Estrada RN and Aleah Carlson RN performed a dual skin assessment on this patient Impairment noted- see wound doc flow sheet Jose G score is 22

## 2019-02-10 NOTE — ED NOTES
TRANSFER - OUT REPORT: 
 
Verbal report given to Vee MARTIN on Santiago Watters  being transferred to [facility] for routine progression of care Report consisted of patients Situation, Background, Assessment and  
Recommendations(SBAR). Information from the following report(s) ED Summary was reviewed with the receiving nurse. Lines:  
Peripheral IV 02/10/19 Left Antecubital (Active) Site Assessment Clean, dry, & intact 2/10/2019 12:30 PM  
Phlebitis Assessment 0 2/10/2019 12:30 PM  
Infiltration Assessment 0 2/10/2019 12:30 PM  
Dressing Status Clean, dry, & intact 2/10/2019 12:30 PM  
  
 
Opportunity for questions and clarification was provided. Patient transported with: 
 Registered Nurse

## 2019-02-10 NOTE — ED PROVIDER NOTES
Patient with recent diverticulitis with perforation admitted to the hospital couple weeks ago. Followed by Dr. Robe Miller. Was discharged 1.5 weeks ago. On Cipro and Flagyl. Started having increased pain yesterday and worse today so came in. The history is provided by the patient. No  was used. Abdominal Pain This is a new problem. The current episode started yesterday. The problem occurs constantly. The problem has been gradually worsening. The pain is associated with an unknown factor. The pain is located in the LLQ. The quality of the pain is aching and sharp. The pain is moderate. Associated symptoms include constipation. Pertinent negatives include no fever, no diarrhea, no melena, no nausea, no vomiting, no dysuria, no hematuria, no headaches, no chest pain and no back pain. Nothing worsens the pain. The pain is relieved by nothing. Past workup includes CT scan. His past medical history is significant for diverticulitis. Past Medical History:  
Diagnosis Date  Anxiety and depression  Dyslipidemia  Fatty liver  GERD (gastroesophageal reflux disease)  Hyperlipidemia, mixed 12/14/2015  Obesity Past Surgical History:  
Procedure Laterality Date  HX COLONOSCOPY  2006  HX TONSILLECTOMY    
 as a child Family History:  
Problem Relation Age of Onset  Cancer Mother Leukemia  Cancer Father Colon, Prostate, & squamous cell  Heart Disease Father CHF  Diabetes Father Type II (NIDDM)  Cancer Sister Lung  Cancer Brother Colon Social History Socioeconomic History  Marital status:  Spouse name: Not on file  Number of children: Not on file  Years of education: Not on file  Highest education level: Not on file Social Needs  Financial resource strain: Not on file  Food insecurity - worry: Not on file  Food insecurity - inability: Not on file  Transportation needs - medical: Not on file  Transportation needs - non-medical: Not on file Occupational History  Not on file Tobacco Use  Smoking status: Never Smoker  Smokeless tobacco: Never Used Substance and Sexual Activity  Alcohol use: Yes Comment: occassionally  Drug use: Not on file  Sexual activity: Not on file Other Topics Concern  Not on file Social History Narrative  Not on file ALLERGIES: Patient has no known allergies. Review of Systems Constitutional: Negative for chills and fever. HENT: Negative for rhinorrhea and sore throat. Eyes: Negative for pain and redness. Respiratory: Negative for chest tightness, shortness of breath and wheezing. Cardiovascular: Negative for chest pain and leg swelling. Gastrointestinal: Positive for abdominal pain and constipation. Negative for diarrhea, melena, nausea and vomiting. Genitourinary: Negative for dysuria and hematuria. Musculoskeletal: Negative for back pain, gait problem, neck pain and neck stiffness. Skin: Negative for color change and rash. Neurological: Negative for weakness, numbness and headaches. Vitals:  
 02/10/19 1203 BP: 128/83 Pulse: 90 Resp: 16 Temp: 98.8 °F (37.1 °C) SpO2: 94% Weight: 104.3 kg (230 lb) Height: 6' (1.829 m) Physical Exam  
Constitutional: He is oriented to person, place, and time. He appears well-developed and well-nourished. HENT:  
Head: Normocephalic and atraumatic. Neck: Normal range of motion. Neck supple. Cardiovascular: Normal rate and regular rhythm. Pulmonary/Chest: Effort normal and breath sounds normal.  
Abdominal: Soft. Bowel sounds are normal. There is tenderness (LLQ). There is guarding. There is no rebound. Musculoskeletal: Normal range of motion. He exhibits no edema. Neurological: He is alert and oriented to person, place, and time. Skin: Skin is warm and dry. MDM Number of Diagnoses or Management Options Diagnosis management comments: Patient with recurrent perforated diverticulitis. Will admit to surgery. Amount and/or Complexity of Data Reviewed Clinical lab tests: ordered and reviewed Tests in the radiology section of CPT®: ordered and reviewed Tests in the medicine section of CPT®: ordered and reviewed Patient Progress Patient progress: stable Procedures CT ABD PELV W CONT (Final result) Result time 02/10/19 14:31:07 Final result by Lizbet Coppola MD (02/10/19 14:31:07) Impression:  
 IMPRESSION: Diverticulitis in the left lower quadrant with scattered free 
intraperitoneal air. Findings suggest recurrent perforated diverticulitis. Surgical follow-up is recommended. Findings were discussed with the ordering physician in the emergency department 
who was caring for the patient. Narrative:  
 CT ABDOMEN AND PELVIS WITH CONTRAST 2/10/2019 HISTORY: Left-sided abdominal pain. Perforated bowel 2 weeks ago. Constipation. TECHNIQUE: The patient received oral contrast and 100 mL Isovue-370 nonionic IV 
contrast. Axial images were obtained through the abdomen and pelvis. Coronal 
reformatted images were generated.  All CT scans at this facility used dose 
modulation, interactive reconstruction and/or weight based dosing when 
appropriate to reduce radiation dose to as low as reasonably achievable. COMPARISON: 1/28/2019 FINDINGS: Included portions of the lung bases are clear. ABDOMEN: Scattered free intraperitoneal air is present. Enteric contrast is 
present throughout the large and small bowel. Colonic diverticula are present. There is stranding in the pericolonic fat around the sigmoid colon (image 70) without a peripherally enhancing pericolonic abscess. There is no inflammation 
in the right lower quadrant. 
 
 The gallbladder, liver, pancreas, spleen, adrenal glands and kidneys are normal 
in appearance. PELVIS: The bladder is normal in appearance. There is no free pelvic fluid. There are no aggressive osseous lesions.   
  
  
  
   
 
Results Include: 
 
Recent Results (from the past 24 hour(s)) CBC WITH AUTOMATED DIFF Collection Time: 02/10/19 12:29 PM  
Result Value Ref Range WBC 9.6 4.3 - 11.1 K/uL  
 RBC 5.82 (H) 4.23 - 5.6 M/uL  
 HGB 15.5 13.6 - 17.2 g/dL HCT 47.4 41.1 - 50.3 % MCV 81.4 79.6 - 97.8 FL  
 MCH 26.6 26.1 - 32.9 PG  
 MCHC 32.7 31.4 - 35.0 g/dL  
 RDW 13.1 11.9 - 14.6 % PLATELET 856 347 - 875 K/uL MPV 10.5 9.4 - 12.3 FL ABSOLUTE NRBC 0.00 0.0 - 0.2 K/uL  
 DF AUTOMATED NEUTROPHILS 73 43 - 78 % LYMPHOCYTES 18 13 - 44 % MONOCYTES 8 4.0 - 12.0 % EOSINOPHILS 1 0.5 - 7.8 % BASOPHILS 1 0.0 - 2.0 % IMMATURE GRANULOCYTES 0 0.0 - 5.0 %  
 ABS. NEUTROPHILS 7.0 1.7 - 8.2 K/UL  
 ABS. LYMPHOCYTES 1.7 0.5 - 4.6 K/UL  
 ABS. MONOCYTES 0.8 0.1 - 1.3 K/UL  
 ABS. EOSINOPHILS 0.1 0.0 - 0.8 K/UL  
 ABS. BASOPHILS 0.1 0.0 - 0.2 K/UL  
 ABS. IMM. GRANS. 0.0 0.0 - 0.5 K/UL METABOLIC PANEL, COMPREHENSIVE Collection Time: 02/10/19 12:29 PM  
Result Value Ref Range Sodium 141 136 - 145 mmol/L Potassium 3.9 3.5 - 5.1 mmol/L Chloride 107 98 - 107 mmol/L  
 CO2 27 21 - 32 mmol/L Anion gap 7 mmol/L Glucose 111 (H) 65 - 100 mg/dL BUN 11 8 - 23 MG/DL Creatinine 0.97 0.8 - 1.5 MG/DL  
 GFR est AA >60 >60 ml/min/1.73m2 GFR est non-AA >60 ml/min/1.73m2 Calcium 9.1 8.3 - 10.4 MG/DL Bilirubin, total 0.7 0.2 - 1.1 MG/DL  
 ALT (SGPT) 25 12 - 65 U/L  
 AST (SGOT) 16 15 - 37 U/L Alk. phosphatase 33 (L) 50 - 136 U/L Protein, total 7.3 g/dL Albumin 4.1 3.2 - 4.6 g/dL Globulin 3.2 2.3 - 3.5 g/dL A-G Ratio 1.3 LIPASE Collection Time: 02/10/19 12:29 PM  
Result Value Ref Range  Lipase 196 73 - 393 U/L

## 2019-02-10 NOTE — PROGRESS NOTES
Hourly rounding completed as per hospital protocol. Patient resting in room. No s/s of distress. Patient denies any further needs Will continue to monitor until Report handed off to on coming RN. Alert and oriented x4. Wife at bedsde 
Pain. ...controlled Potty. ..ambulates to the bathroom Position. ..in bed Possessions. .. ..in pts care

## 2019-02-10 NOTE — PROGRESS NOTES
TRANSFER - IN REPORT: 
 
Verbal report received from [facility] (name) on Cecelia Leavitt  being received from ED (unit) for routine progression of care Report consisted of patients Situation, Background, Assessment and  
Recommendations(SBAR). Information from the following report(s) SBAR was reviewed with the receiving nurse. Opportunity for questions and clarification was provided. Assessment completed upon patients arrival to unit and care assumed.

## 2019-02-10 NOTE — ED TRIAGE NOTES
Couple weeks ago pt had a perfed bowel from an episode of diverticulitis. released from here a 1.5 weeks ago. On antibiotics since then. Constipation for days, barely any gas.

## 2019-02-10 NOTE — H&P
Gianluca Valdez MD  
Bariatric & Advanced Laparoscopic Surgery & Endoscopy [facility], [address] [place] Phone [phone]   Fax [phone] Date of visit: 2/10/2019 Primary/Requesting provider: Edd Fleischer, MD  
   
 
Name: Lulú Hidalgo MRN: [id number] : 1955 Age: 70 y.o. Sex: male PCP: Edd Fleischer, MD  
 
CC:   
Chief Complaint Patient presents with  Abdominal Pain HPI: 
 
 Lulú Hidalgo is a 70 y.o. male who presents to the ER complaining of LLQ pain that started last night. Pain is sharp 9/10 in intensity. Pain is worse with movement. Pain is better with pain medication and decrease to 5/10. No nausea or vomiting. No fever or chills. He does reports some constipation. Last BM was today. He was discharged about 10 days ago from his first episode of perforated diverticulitis and was doing well at home on po cipro/flagyl until the pain recurred last night. No previous abdominal surgery. Last colonoscopy was 2018 DIAGNOSIS  
COLON POLYP: FRAGMENTS OF HYPERPLASTIC POLYP, FOCALLY ACUTELY INFLAMED. PMH: 
 
Past Medical History:  
Diagnosis Date  Anxiety and depression  Dyslipidemia  Fatty liver  GERD (gastroesophageal reflux disease)  Hyperlipidemia, mixed 2015  Obesity PSH: 
 
Past Surgical History:  
Procedure Laterality Date  HX COLONOSCOPY    HX TONSILLECTOMY    
 as a child MEDS: 
 
Current Facility-Administered Medications Medication  sodium chloride (NS) flush 5-40 mL  sodium chloride (NS) flush 5-40 mL  morphine injection 2 mg  ondansetron (ZOFRAN) injection 4 mg  heparin (porcine) injection 5,000 Units  ciprofloxacin (CIPRO) 400 mg IVPB (premix)  0.9% sodium chloride infusion  metroNIDAZOLE (FLAGYL) IVPB premix 500 mg  
 
Current Outpatient Medications Medication Sig  
 naproxen (NAPROSYN) 500 mg tablet Take 1 Tab by mouth two (2) times daily (with meals).  fenofibric acid (TRILIPIX) 135 mg capsule Take 1 Cap by mouth daily.  simvastatin (ZOCOR) 20 mg tablet Take 1 Tab by mouth nightly.  sildenafil citrate (VIAGRA) 50 mg tablet Take one tablet one hour before intercourse  aspirin delayed-release 81 mg tablet Take 81 mg by mouth daily. ALLERGIES:   
 
No Known Allergies SH: Social History Tobacco Use  Smoking status: Never Smoker  Smokeless tobacco: Never Used Substance Use Topics  Alcohol use: Yes Comment: occassionally  Drug use: Not on file FH: 
 
Family History Problem Relation Age of Onset  Cancer Mother Leukemia  Cancer Father Colon, Prostate, & squamous cell  Heart Disease Father CHF  Diabetes Father Type II (NIDDM)  Cancer Sister Lung  Cancer Brother Colon Review of systems: 
Review of Systems Constitutional: Negative. HENT: Negative. Respiratory: Negative. Cardiovascular: Negative. Gastrointestinal: Positive for abdominal pain and constipation. Negative for blood in stool, diarrhea, nausea and vomiting. Genitourinary: Negative. Musculoskeletal: Negative. Skin: Negative. Neurological: Negative. Endo/Heme/Allergies: Negative. Psychiatric/Behavioral: Negative. Physical Exam:  
 
Visit Vitals /83 (BP 1 Location: Left arm, BP Patient Position: At rest;Sitting) Pulse 90 Temp 98.8 °F (37.1 °C) Resp 16 Ht 6' (1.829 m) Wt 230 lb (104.3 kg) SpO2 94% BMI 31.19 kg/m² General:  Well-developed, well-nourished, no distress. Psych:  Cooperative, good insight and judgement. Neuro:  Alert, oriented to person, place and time. HEENT:  Normocephalic, atraumatic. Sclera clear. Lungs:  Unlabored breathing. Symmetrical chest expansion. Chest wall:  No tenderness or deformity. Heart:  Regular rate and rhythm. No JVD. Abdomen: Soft, tender in LLQ, non-distended. No guarding or rebound. No palpable masses. No peritonitis. Extremities:  Extremities normal, atraumatic, no cyanosis or edema. Skin:  Skin color, texture, turgor normal. No rashes. Labs: All recent labs were reviewed. No leukocytosis. Recent Results (from the past 24 hour(s)) CBC WITH AUTOMATED DIFF Collection Time: 02/10/19 12:29 PM  
Result Value Ref Range WBC 9.6 4.3 - 11.1 K/uL  
 RBC 5.82 (H) 4.23 - 5.6 M/uL  
 HGB 15.5 13.6 - 17.2 g/dL HCT 47.4 41.1 - 50.3 % MCV 81.4 79.6 - 97.8 FL  
 MCH 26.6 26.1 - 32.9 PG  
 MCHC 32.7 31.4 - 35.0 g/dL  
 RDW 13.1 11.9 - 14.6 % PLATELET 323 818 - 109 K/uL MPV 10.5 9.4 - 12.3 FL ABSOLUTE NRBC 0.00 0.0 - 0.2 K/uL  
 DF AUTOMATED NEUTROPHILS 73 43 - 78 % LYMPHOCYTES 18 13 - 44 % MONOCYTES 8 4.0 - 12.0 % EOSINOPHILS 1 0.5 - 7.8 % BASOPHILS 1 0.0 - 2.0 % IMMATURE GRANULOCYTES 0 0.0 - 5.0 %  
 ABS. NEUTROPHILS 7.0 1.7 - 8.2 K/UL  
 ABS. LYMPHOCYTES 1.7 0.5 - 4.6 K/UL  
 ABS. MONOCYTES 0.8 0.1 - 1.3 K/UL  
 ABS. EOSINOPHILS 0.1 0.0 - 0.8 K/UL  
 ABS. BASOPHILS 0.1 0.0 - 0.2 K/UL  
 ABS. IMM. GRANS. 0.0 0.0 - 0.5 K/UL METABOLIC PANEL, COMPREHENSIVE Collection Time: 02/10/19 12:29 PM  
Result Value Ref Range Sodium 141 136 - 145 mmol/L Potassium 3.9 3.5 - 5.1 mmol/L Chloride 107 98 - 107 mmol/L  
 CO2 27 21 - 32 mmol/L Anion gap 7 mmol/L Glucose 111 (H) 65 - 100 mg/dL BUN 11 8 - 23 MG/DL Creatinine 0.97 0.8 - 1.5 MG/DL  
 GFR est AA >60 >60 ml/min/1.73m2 GFR est non-AA >60 ml/min/1.73m2 Calcium 9.1 8.3 - 10.4 MG/DL Bilirubin, total 0.7 0.2 - 1.1 MG/DL  
 ALT (SGPT) 25 12 - 65 U/L  
 AST (SGOT) 16 15 - 37 U/L Alk. phosphatase 33 (L) 50 - 136 U/L Protein, total 7.3 g/dL Albumin 4.1 3.2 - 4.6 g/dL Globulin 3.2 2.3 - 3.5 g/dL A-G Ratio 1.3 LIPASE Collection Time: 02/10/19 12:29 PM  
Result Value Ref Range  Lipase 196 73 - 393 U/L  
 
 Imaging: CT images were independently reviewed by me. Sigmoid colon inflamed with adjacent free air. Foci of free air in upper abdomen also that were previously resolved. Patient Active Problem List  
 Diagnosis Date Noted  LLQ pain 01/24/2019  Free intraperitoneal air 01/24/2019  Acute diverticulitis 01/24/2019  Leukocytosis 01/24/2019  Diverticulitis of colon with perforation 01/24/2019  Hepatic steatosis 01/24/2019  Hyperlipidemia, mixed 12/14/2015 Assessment/Plan:  Elgie Buerger is a 70 y.o. male who has signs and symptoms consistent with perforated diverticulitis 1. I reviewed in detail with the patient and wife what diverticulitis is, how it occurs and the anatomy of the colon. 2. I discussed with the patient and wife the findings of the CT scan and his current clinical picture. This could be consider a failed outpatient treatment to his first episode of perforated diverticulitis vs a recurrence. He did had an interval CT showing resolution of the free air. 3.  I had a long discussion with him about the alternatives of treatment including the need for an ostomy if he were to need surgery under the current circumstances. 4. He will be admitted to the hospital. NPO, IVFs. IV cipro/flagyl. Serial abd exams. 5.  I will inform Dr. Rashid Alas in the morning about him and they will discuss the next steps of his care as long as he does not decompensate overnight. He understands that if he develops peritonitis or becomes septic overnight, he will need emergent surgery that will require an ostomy. Signed: Corey Rondon MD 
Bariatric & Minimally Invasive Surgery 2/10/2019 4:20 PM

## 2019-02-11 LAB
ANION GAP SERPL CALC-SCNC: 8 MMOL/L
BASOPHILS # BLD: 0 K/UL (ref 0–0.2)
BASOPHILS NFR BLD: 1 % (ref 0–2)
BUN SERPL-MCNC: 10 MG/DL (ref 8–23)
CALCIUM SERPL-MCNC: 8.3 MG/DL (ref 8.3–10.4)
CHLORIDE SERPL-SCNC: 110 MMOL/L (ref 98–107)
CO2 SERPL-SCNC: 26 MMOL/L (ref 21–32)
CREAT SERPL-MCNC: 0.7 MG/DL (ref 0.8–1.5)
DIFFERENTIAL METHOD BLD: NORMAL
EOSINOPHIL # BLD: 0.1 K/UL (ref 0–0.8)
EOSINOPHIL NFR BLD: 3 % (ref 0.5–7.8)
ERYTHROCYTE [DISTWIDTH] IN BLOOD BY AUTOMATED COUNT: 13.2 % (ref 11.9–14.6)
GLUCOSE SERPL-MCNC: 106 MG/DL (ref 65–100)
HCT VFR BLD AUTO: 43 % (ref 41.1–50.3)
HGB BLD-MCNC: 13.6 G/DL (ref 13.6–17.2)
IMM GRANULOCYTES # BLD AUTO: 0 K/UL (ref 0–0.5)
IMM GRANULOCYTES NFR BLD AUTO: 0 % (ref 0–5)
LYMPHOCYTES # BLD: 1.6 K/UL (ref 0.5–4.6)
LYMPHOCYTES NFR BLD: 29 % (ref 13–44)
MCH RBC QN AUTO: 26.3 PG (ref 26.1–32.9)
MCHC RBC AUTO-ENTMCNC: 31.6 G/DL (ref 31.4–35)
MCV RBC AUTO: 83.2 FL (ref 79.6–97.8)
MONOCYTES # BLD: 0.6 K/UL (ref 0.1–1.3)
MONOCYTES NFR BLD: 11 % (ref 4–12)
NEUTS SEG # BLD: 3 K/UL (ref 1.7–8.2)
NEUTS SEG NFR BLD: 56 % (ref 43–78)
NRBC # BLD: 0 K/UL (ref 0–0.2)
PLATELET # BLD AUTO: 224 K/UL (ref 150–450)
PMV BLD AUTO: 11.4 FL (ref 9.4–12.3)
POTASSIUM SERPL-SCNC: 3.6 MMOL/L (ref 3.5–5.1)
RBC # BLD AUTO: 5.17 M/UL (ref 4.23–5.6)
SODIUM SERPL-SCNC: 144 MMOL/L (ref 136–145)
WBC # BLD AUTO: 5.4 K/UL (ref 4.3–11.1)

## 2019-02-11 PROCEDURE — 77030020263 HC SOL INJ SOD CL0.9% LFCR 1000ML

## 2019-02-11 PROCEDURE — 74011250636 HC RX REV CODE- 250/636: Performed by: SURGERY

## 2019-02-11 PROCEDURE — 80048 BASIC METABOLIC PNL TOTAL CA: CPT

## 2019-02-11 PROCEDURE — 74011250637 HC RX REV CODE- 250/637: Performed by: SURGERY

## 2019-02-11 PROCEDURE — 65270000029 HC RM PRIVATE

## 2019-02-11 PROCEDURE — 85025 COMPLETE CBC W/AUTO DIFF WBC: CPT

## 2019-02-11 PROCEDURE — 99231 SBSQ HOSP IP/OBS SF/LOW 25: CPT | Performed by: SURGERY

## 2019-02-11 PROCEDURE — 36415 COLL VENOUS BLD VENIPUNCTURE: CPT

## 2019-02-11 RX ORDER — OXYCODONE AND ACETAMINOPHEN 5; 325 MG/1; MG/1
1 TABLET ORAL
Status: DISCONTINUED | OUTPATIENT
Start: 2019-02-11 | End: 2019-02-15 | Stop reason: HOSPADM

## 2019-02-11 RX ORDER — DOCUSATE SODIUM 100 MG/1
100 CAPSULE, LIQUID FILLED ORAL 2 TIMES DAILY
Status: DISCONTINUED | OUTPATIENT
Start: 2019-02-11 | End: 2019-02-15 | Stop reason: HOSPADM

## 2019-02-11 RX ORDER — ACETAMINOPHEN 325 MG/1
650 TABLET ORAL
Status: DISCONTINUED | OUTPATIENT
Start: 2019-02-11 | End: 2019-02-15 | Stop reason: HOSPADM

## 2019-02-11 RX ADMIN — Medication 10 ML: at 15:15

## 2019-02-11 RX ADMIN — DOCUSATE SODIUM 100 MG: 100 CAPSULE, LIQUID FILLED ORAL at 17:22

## 2019-02-11 RX ADMIN — METRONIDAZOLE 500 MG: 500 INJECTION, SOLUTION INTRAVENOUS at 17:23

## 2019-02-11 RX ADMIN — CIPROFLOXACIN 400 MG: 2 INJECTION, SOLUTION INTRAVENOUS at 02:41

## 2019-02-11 RX ADMIN — SODIUM CHLORIDE 50 ML/HR: 900 INJECTION, SOLUTION INTRAVENOUS at 17:28

## 2019-02-11 RX ADMIN — Medication 10 ML: at 05:56

## 2019-02-11 RX ADMIN — DOCUSATE SODIUM 100 MG: 100 CAPSULE, LIQUID FILLED ORAL at 10:13

## 2019-02-11 RX ADMIN — ONDANSETRON 4 MG: 2 INJECTION INTRAMUSCULAR; INTRAVENOUS at 08:47

## 2019-02-11 RX ADMIN — CIPROFLOXACIN 400 MG: 2 INJECTION, SOLUTION INTRAVENOUS at 15:11

## 2019-02-11 RX ADMIN — HEPARIN SODIUM 5000 UNITS: 5000 INJECTION INTRAVENOUS; SUBCUTANEOUS at 23:23

## 2019-02-11 RX ADMIN — HEPARIN SODIUM 5000 UNITS: 5000 INJECTION INTRAVENOUS; SUBCUTANEOUS at 15:13

## 2019-02-11 RX ADMIN — Medication 10 ML: at 23:26

## 2019-02-11 RX ADMIN — METRONIDAZOLE 500 MG: 500 INJECTION, SOLUTION INTRAVENOUS at 06:01

## 2019-02-11 RX ADMIN — HEPARIN SODIUM 5000 UNITS: 5000 INJECTION INTRAVENOUS; SUBCUTANEOUS at 08:43

## 2019-02-11 RX ADMIN — SODIUM CHLORIDE 100 ML/HR: 900 INJECTION, SOLUTION INTRAVENOUS at 02:45

## 2019-02-11 RX ADMIN — MORPHINE SULFATE 2 MG: 4 INJECTION INTRAVENOUS at 08:00

## 2019-02-11 NOTE — PROGRESS NOTES
Shift assessment completed via doc flow sheet. Pt A & O x 4. Lungs CTA, HR WNL, NSR. Abdomen soft and tender. C/o abdominal pain rated 3/10. PRN Morphine 2mg IVP administered. IVS c/d/i, no s/sx of infection/infiltration noted. Pt able to make needs known. No concerns at this time. Bed low and locked. Call light within reach. Will continue to monitor.

## 2019-02-11 NOTE — PROGRESS NOTES
Amanda Leung MD  
Bariatric & Advanced Laparoscopic Surgery & Endoscopy [place] [address], [address] [place] Phone [phone]   Fax [phone] Date of visit: 2019 Name: Nia Lott MRN: [id number] : 1955 Age: 70 y.o. Sex: male Subjective: Nia Lott is a 70 y.o. male  who presents to the ER complaining of LLQ pain that started last night. Pain is sharp 9/10 in intensity. Pain is worse with movement. Pain is better with pain medication and decrease to 5/10. No nausea or vomiting. No fever or chills. He does reports some constipation. Last BM was today. He was discharged about 10 days ago from his first episode of perforated diverticulitis and was doing well at home on po cipro/flagyl until the pain recurred last night. No previous abdominal surgery. 
  
Last colonoscopy was 19 - Doing well. Abd pain resolved. No nausea or vomiting. No fevers. MEDS: 
 
Current Facility-Administered Medications Medication  sodium chloride (NS) flush 5-40 mL  sodium chloride (NS) flush 5-40 mL  morphine injection 2 mg  ondansetron (ZOFRAN) injection 4 mg  heparin (porcine) injection 5,000 Units  ciprofloxacin (CIPRO) 400 mg IVPB (premix)  0.9% sodium chloride infusion  metroNIDAZOLE (FLAGYL) IVPB premix 500 mg ALLERGIES:    
 
No Known Allergies I/O: 
 
No intake or output data in the 24 hours ending 19 0806 Physical Exam:  
 
Visit Vitals /80 (BP 1 Location: Right arm, BP Patient Position: At rest;Head of bed elevated (Comment degrees)) Pulse 73 Temp 97.9 °F (36.6 °C) Resp 18 Ht 6' (1.829 m) Wt 230 lb (104.3 kg) SpO2 95% BMI 31.19 kg/m² General:  Well-developed, well-nourished, no distress. Psych:  Cooperative, good insight and judgement. Neuro:  Alert, oriented to person, place and time. HEENT:  Normocephalic, atraumatic. Sclera clear. Lungs:  Unlabored breathing. Symmetrical chest expansion. Chest wall:  No tenderness or deformity. Heart:  Regular rate and rhythm. No JVD. Abdomen:  Soft, non-tender, non-distended. No guarding or rebound. Extremities:  Extremities normal, atraumatic, no cyanosis or edema. Skin:  Skin color, texture, turgor normal. No rashes. Labs: All recent labs were reviewed. Normal WBC. Recent Results (from the past 24 hour(s)) CBC WITH AUTOMATED DIFF Collection Time: 02/10/19 12:29 PM  
Result Value Ref Range WBC 9.6 4.3 - 11.1 K/uL  
 RBC 5.82 (H) 4.23 - 5.6 M/uL  
 HGB 15.5 13.6 - 17.2 g/dL HCT 47.4 41.1 - 50.3 % MCV 81.4 79.6 - 97.8 FL  
 MCH 26.6 26.1 - 32.9 PG  
 MCHC 32.7 31.4 - 35.0 g/dL  
 RDW 13.1 11.9 - 14.6 % PLATELET 077 719 - 187 K/uL MPV 10.5 9.4 - 12.3 FL ABSOLUTE NRBC 0.00 0.0 - 0.2 K/uL  
 DF AUTOMATED NEUTROPHILS 73 43 - 78 % LYMPHOCYTES 18 13 - 44 % MONOCYTES 8 4.0 - 12.0 % EOSINOPHILS 1 0.5 - 7.8 % BASOPHILS 1 0.0 - 2.0 % IMMATURE GRANULOCYTES 0 0.0 - 5.0 %  
 ABS. NEUTROPHILS 7.0 1.7 - 8.2 K/UL  
 ABS. LYMPHOCYTES 1.7 0.5 - 4.6 K/UL  
 ABS. MONOCYTES 0.8 0.1 - 1.3 K/UL  
 ABS. EOSINOPHILS 0.1 0.0 - 0.8 K/UL  
 ABS. BASOPHILS 0.1 0.0 - 0.2 K/UL  
 ABS. IMM. GRANS. 0.0 0.0 - 0.5 K/UL METABOLIC PANEL, COMPREHENSIVE Collection Time: 02/10/19 12:29 PM  
Result Value Ref Range Sodium 141 136 - 145 mmol/L Potassium 3.9 3.5 - 5.1 mmol/L Chloride 107 98 - 107 mmol/L  
 CO2 27 21 - 32 mmol/L Anion gap 7 mmol/L Glucose 111 (H) 65 - 100 mg/dL BUN 11 8 - 23 MG/DL Creatinine 0.97 0.8 - 1.5 MG/DL  
 GFR est AA >60 >60 ml/min/1.73m2 GFR est non-AA >60 ml/min/1.73m2 Calcium 9.1 8.3 - 10.4 MG/DL Bilirubin, total 0.7 0.2 - 1.1 MG/DL  
 ALT (SGPT) 25 12 - 65 U/L  
 AST (SGOT) 16 15 - 37 U/L Alk. phosphatase 33 (L) 50 - 136 U/L Protein, total 7.3 g/dL Albumin 4.1 3.2 - 4.6 g/dL Globulin 3.2 2.3 - 3.5 g/dL A-G Ratio 1.3 LIPASE Collection Time: 02/10/19 12:29 PM  
Result Value Ref Range Lipase 196 73 - 393 U/L  
CBC WITH AUTOMATED DIFF Collection Time: 02/11/19  6:13 AM  
Result Value Ref Range WBC 5.4 4.3 - 11.1 K/uL  
 RBC 5.17 4.23 - 5.6 M/uL  
 HGB 13.6 13.6 - 17.2 g/dL HCT 43.0 41.1 - 50.3 % MCV 83.2 79.6 - 97.8 FL  
 MCH 26.3 26.1 - 32.9 PG  
 MCHC 31.6 31.4 - 35.0 g/dL  
 RDW 13.2 11.9 - 14.6 % PLATELET 988 656 - 966 K/uL MPV 11.4 9.4 - 12.3 FL ABSOLUTE NRBC 0.00 0.0 - 0.2 K/uL  
 DF AUTOMATED NEUTROPHILS 56 43 - 78 % LYMPHOCYTES 29 13 - 44 % MONOCYTES 11 4.0 - 12.0 % EOSINOPHILS 3 0.5 - 7.8 % BASOPHILS 1 0.0 - 2.0 % IMMATURE GRANULOCYTES 0 0.0 - 5.0 %  
 ABS. NEUTROPHILS 3.0 1.7 - 8.2 K/UL  
 ABS. LYMPHOCYTES 1.6 0.5 - 4.6 K/UL  
 ABS. MONOCYTES 0.6 0.1 - 1.3 K/UL  
 ABS. EOSINOPHILS 0.1 0.0 - 0.8 K/UL  
 ABS. BASOPHILS 0.0 0.0 - 0.2 K/UL  
 ABS. IMM. GRANS. 0.0 0.0 - 0.5 K/UL METABOLIC PANEL, BASIC Collection Time: 02/11/19  6:13 AM  
Result Value Ref Range Sodium 144 136 - 145 mmol/L Potassium 3.6 3.5 - 5.1 mmol/L Chloride 110 (H) 98 - 107 mmol/L  
 CO2 26 21 - 32 mmol/L Anion gap 8 mmol/L Glucose 106 (H) 65 - 100 mg/dL BUN 10 8 - 23 MG/DL Creatinine 0.70 (L) 0.8 - 1.5 MG/DL  
 GFR est AA >60 >60 ml/min/1.73m2 GFR est non-AA >60 ml/min/1.73m2 Calcium 8.3 8.3 - 10.4 MG/DL Assessment/Plan:  Fabi Taylor is a 70 y.o. male with perforated diverticulitis Pain control Abd benign DIET NPO - advance to CLD Bowel regimen - colace IV cipro/flagyl OOB and ambulate as tolerated DVT proph - Heparin Patient will like to avoid a colostomy by all means. Will continue to attempt conservative management to try to get him through the acute phase. Signed: Tahmina Lopez MD 
Bariatric & Minimally Invasive Surgery 2/11/2019 8:06 AM

## 2019-02-11 NOTE — PROGRESS NOTES
Care Management Interventions PCP Verified by CM: Yes Mode of Transport at Discharge: Other (see comment) Transition of Care Consult (CM Consult): Other Current Support Network: Lives with Spouse Confirm Follow Up Transport: Family Plan discussed with Pt/Family/Caregiver: Yes Freedom of Choice Offered: Yes Discharge Location Discharge Placement: Home Visited with pt regarding plans for discharge, plans to return home/spouse/inde with ADL's/has no needs at this time. 2/12 Per Dr. Raymon Kim. Laura Ren Assessment/Plan:  Haydee Mcclain is a 70 y.o. male with perforated diverticulitis Pain control Abd benign DIET CLEAR LIQUID - advance to FLD Bowel regimen - colace IV cipro/flagyl OOB and ambulate as tolerated DVT proph - Heparin   
  
Patient will like to avoid a colostomy by all means. Will continue to attempt conservative management to try to get him through the acute phase. Will repeat CT scan later this week. 2/13 Per Surgical progress note from 2/12 @ 1600 pt. ..... Laura Ren Patient will like to avoid a colostomy by all means. Will continue to attempt conservative management to try to get him through the acute phase. Will repeat CT scan later this week. PEr Dr. Raymon Kim. 2/14 Per surgery pt will need a PICC and 2 weeks of IV ABX( Flagyl and Cipro. [employer] + aware. 2/15 @ 0823 Dr Antione Santos here ans wanting to d/c pt, I informed here that I have made the infusion company aware yesterday and they are on there way here this am to see pt, but I was not sure per her note when pt was actually to d/c home. She states that she wants to d/c now, I will need some time to set up [facility], she states that she already has, I called Cindy Bedoya and she does not have a referral. Ins needs to be run to check on coverage's and I need a official order on medication dosing/frequency and EOT date. She placed the d/c order. 2/15 @ 0902 info faxed to [employer] + to attempt to expedite the process.  
 
2/15 @ 6680 [employer] + has received ins approval/cost to pt/explained to pt/ medication education is complete, Interim HHRN has been arranged and pt is ready to d/c home. RN has paged Dr. Tres Tilley to ask for the d/c order. Care Management Interventions PCP Verified by CM: Yes Mode of Transport at Discharge: Other (see comment) Transition of Care Consult (CM Consult): Home Health [address]: No 
Reason Outside [facility] Insurance Agency Chosen: Unable to staff case Physical Therapy Consult: (RN) Current Support Network: Own Home Confirm Follow Up Transport: Family Plan discussed with Pt/Family/Caregiver: Yes Freedom of Choice Offered: Yes Discharge Location Discharge Placement: Home with home health

## 2019-02-11 NOTE — PROGRESS NOTES
Report received from off going RN. Patient resting comfortably in room. Alert and oriented x4. No distress observed. Pain. ...controlled Position. ..resting in bed Possessions. ..in pts care Potty. .. Carrolyn Grange Carrolyn Grange Susana Montaño has a bowel ,movement that was soft and somewhat loose. .  Voiding without dfficulty.

## 2019-02-12 PROCEDURE — 74011250637 HC RX REV CODE- 250/637: Performed by: HOSPITALIST

## 2019-02-12 PROCEDURE — 99231 SBSQ HOSP IP/OBS SF/LOW 25: CPT | Performed by: SURGERY

## 2019-02-12 PROCEDURE — 74011250636 HC RX REV CODE- 250/636: Performed by: SURGERY

## 2019-02-12 PROCEDURE — 74011250637 HC RX REV CODE- 250/637: Performed by: SURGERY

## 2019-02-12 PROCEDURE — 77030020263 HC SOL INJ SOD CL0.9% LFCR 1000ML

## 2019-02-12 PROCEDURE — 65270000029 HC RM PRIVATE

## 2019-02-12 RX ORDER — FAMOTIDINE 20 MG/1
20 TABLET, FILM COATED ORAL 2 TIMES DAILY
Status: DISCONTINUED | OUTPATIENT
Start: 2019-02-12 | End: 2019-02-15 | Stop reason: HOSPADM

## 2019-02-12 RX ADMIN — Medication 10 ML: at 05:46

## 2019-02-12 RX ADMIN — FAMOTIDINE 20 MG: 20 TABLET ORAL at 08:30

## 2019-02-12 RX ADMIN — Medication 10 ML: at 15:26

## 2019-02-12 RX ADMIN — METRONIDAZOLE 500 MG: 500 INJECTION, SOLUTION INTRAVENOUS at 05:12

## 2019-02-12 RX ADMIN — Medication 10 ML: at 22:53

## 2019-02-12 RX ADMIN — DOCUSATE SODIUM 100 MG: 100 CAPSULE, LIQUID FILLED ORAL at 17:01

## 2019-02-12 RX ADMIN — CIPROFLOXACIN 400 MG: 2 INJECTION, SOLUTION INTRAVENOUS at 15:22

## 2019-02-12 RX ADMIN — CIPROFLOXACIN 400 MG: 2 INJECTION, SOLUTION INTRAVENOUS at 03:36

## 2019-02-12 RX ADMIN — SODIUM CHLORIDE 50 ML/HR: 900 INJECTION, SOLUTION INTRAVENOUS at 11:52

## 2019-02-12 RX ADMIN — FAMOTIDINE 20 MG: 20 TABLET ORAL at 17:01

## 2019-02-12 RX ADMIN — HEPARIN SODIUM 5000 UNITS: 5000 INJECTION INTRAVENOUS; SUBCUTANEOUS at 22:52

## 2019-02-12 RX ADMIN — HEPARIN SODIUM 5000 UNITS: 5000 INJECTION INTRAVENOUS; SUBCUTANEOUS at 15:21

## 2019-02-12 RX ADMIN — HEPARIN SODIUM 5000 UNITS: 5000 INJECTION INTRAVENOUS; SUBCUTANEOUS at 08:30

## 2019-02-12 RX ADMIN — DOCUSATE SODIUM 100 MG: 100 CAPSULE, LIQUID FILLED ORAL at 08:30

## 2019-02-12 RX ADMIN — METRONIDAZOLE 500 MG: 500 INJECTION, SOLUTION INTRAVENOUS at 16:58

## 2019-02-12 NOTE — PROGRESS NOTES
Shift assessment completed via doc flow sheet. Pt A & O x 4. Lungs CTA, HR WNL, NSR. Abdomen soft and tender to touch. No c/o pain at this time. IVS c/d/i, no s/sx of infection/infiltration noted. Pt able to make needs known. No concerns at this time. Bed low and locked. Call light within reach. Will continue to monitor.

## 2019-02-12 NOTE — PROGRESS NOTES
Jared New MD  
Bariatric & Advanced Laparoscopic Surgery & Endoscopy [facility], [address] [place] Phone [phone]   Fax [phone] Date of visit: 2019 Name: Joelle Pretty MRN: [id number] : 1955 Age: 70 y.o. Sex: male Subjective: Joelle Pretty is a 70 y.o. male  who presents to the ER complaining of LLQ pain that started last night. Pain is sharp 9/10 in intensity. Pain is worse with movement. Pain is better with pain medication and decrease to 5/10. No nausea or vomiting. No fever or chills. He does reports some constipation. Last BM was today. He was discharged about 10 days ago from his first episode of perforated diverticulitis and was doing well at home on po cipro/flagyl until the pain recurred last night. No previous abdominal surgery. 
  
Last colonoscopy was 19 - Doing well. Abd pain resolved. No nausea or vomiting. No fevers. 19 - No issues. Tolerating CLD. + BM. No N/V/ No abdominal pain MEDS: 
 
Current Facility-Administered Medications Medication  famotidine (PEPCID) tablet 20 mg  
 docusate sodium (COLACE) capsule 100 mg  
 acetaminophen (TYLENOL) tablet 650 mg  
 oxyCODONE-acetaminophen (PERCOCET) 5-325 mg per tablet 1 Tab  sodium chloride (NS) flush 5-40 mL  sodium chloride (NS) flush 5-40 mL  ondansetron (ZOFRAN) injection 4 mg  heparin (porcine) injection 5,000 Units  ciprofloxacin (CIPRO) 400 mg IVPB (premix)  0.9% sodium chloride infusion  metroNIDAZOLE (FLAGYL) IVPB premix 500 mg ALLERGIES:    
 
No Known Allergies I/O: 
 
 
Intake/Output Summary (Last 24 hours) at 2019 1321 Last data filed at 2019 1229 Gross per 24 hour Intake 3233 ml Output 2775 ml Net 458 ml Physical Exam:  
 
Visit Vitals /78 (BP 1 Location: Right arm, BP Patient Position: At rest;Head of bed elevated (Comment degrees)) Pulse 65 Temp 95.4 °F (35.2 °C) Resp 16 Ht 6' (1.829 m) Wt 230 lb (104.3 kg) SpO2 92% BMI 31.19 kg/m² General:  Well-developed, well-nourished, no distress. Psych:  Cooperative, good insight and judgement. Neuro:  Alert, oriented to person, place and time. HEENT:  Normocephalic, atraumatic. Sclera clear. Lungs:  Unlabored breathing. Symmetrical chest expansion. Chest wall:  No tenderness or deformity. Heart:  Regular rate and rhythm. No JVD. Abdomen:  Soft, non-tender, non-distended. No guarding or rebound. Extremities:  Extremities normal, atraumatic, no cyanosis or edema. Skin:  Skin color, texture, turgor normal. No rashes. Labs: All recent labs were reviewed. Normal WBC. No results found for this or any previous visit (from the past 24 hour(s)). Assessment/Plan:  Nia Lott is a 70 y.o. male with perforated diverticulitis Pain control Abd benign DIET CLEAR LIQUID - advance to FLD Bowel regimen - colace IV cipro/flagyl OOB and ambulate as tolerated DVT proph - Heparin Patient will like to avoid a colostomy by all means. Will continue to attempt conservative management to try to get him through the acute phase. Will repeat CT scan later this week. Amanda Leung MD 
Bariatric & Minimally Invasive Surgery [address] [place] [address] [facility] 2/12/2019 4:21 PM

## 2019-02-12 NOTE — PROGRESS NOTES
Shift assessment complete. Pt resting in bed. A&Ox4. Respirations present, even, unlabored. Lung sounds clear to auscultation. HR regular, S1&S2 auscultated. Abdomen tender with active bowel sounds in all 4 quadrants. IVF infusing without difficulty. Bed in lowest position, call light within reach, side rails x3. Will continue to monitor throughout the shift.

## 2019-02-12 NOTE — PROGRESS NOTES
Problem: Falls - Risk of 
Goal: *Absence of Falls Document Bessy Otero Fall Risk and appropriate interventions in the flowsheet. Outcome: Progressing Towards Goal 
Fall Risk Interventions: 
  
 
  
 
Medication Interventions: Bed/chair exit alarm

## 2019-02-13 PROCEDURE — 65270000029 HC RM PRIVATE

## 2019-02-13 PROCEDURE — 77030020263 HC SOL INJ SOD CL0.9% LFCR 1000ML

## 2019-02-13 PROCEDURE — 02HV33Z INSERTION OF INFUSION DEVICE INTO SUPERIOR VENA CAVA, PERCUTANEOUS APPROACH: ICD-10-PCS | Performed by: SURGERY

## 2019-02-13 PROCEDURE — 74011250636 HC RX REV CODE- 250/636: Performed by: SURGERY

## 2019-02-13 PROCEDURE — 74011250637 HC RX REV CODE- 250/637: Performed by: HOSPITALIST

## 2019-02-13 PROCEDURE — 74011250637 HC RX REV CODE- 250/637: Performed by: SURGERY

## 2019-02-13 PROCEDURE — 99231 SBSQ HOSP IP/OBS SF/LOW 25: CPT | Performed by: SURGERY

## 2019-02-13 RX ADMIN — Medication 10 ML: at 21:47

## 2019-02-13 RX ADMIN — PSYLLIUM HUSK 1 PACKET: 3.4 POWDER ORAL at 21:46

## 2019-02-13 RX ADMIN — CIPROFLOXACIN 400 MG: 2 INJECTION, SOLUTION INTRAVENOUS at 02:27

## 2019-02-13 RX ADMIN — METRONIDAZOLE 500 MG: 500 INJECTION, SOLUTION INTRAVENOUS at 06:01

## 2019-02-13 RX ADMIN — METRONIDAZOLE 500 MG: 500 INJECTION, SOLUTION INTRAVENOUS at 18:08

## 2019-02-13 RX ADMIN — HEPARIN SODIUM 5000 UNITS: 5000 INJECTION INTRAVENOUS; SUBCUTANEOUS at 23:17

## 2019-02-13 RX ADMIN — DOCUSATE SODIUM 100 MG: 100 CAPSULE, LIQUID FILLED ORAL at 08:50

## 2019-02-13 RX ADMIN — FAMOTIDINE 20 MG: 20 TABLET ORAL at 08:50

## 2019-02-13 RX ADMIN — Medication 10 ML: at 05:36

## 2019-02-13 RX ADMIN — CIPROFLOXACIN 400 MG: 2 INJECTION, SOLUTION INTRAVENOUS at 15:53

## 2019-02-13 RX ADMIN — HEPARIN SODIUM 5000 UNITS: 5000 INJECTION INTRAVENOUS; SUBCUTANEOUS at 15:53

## 2019-02-13 RX ADMIN — DOCUSATE SODIUM 100 MG: 100 CAPSULE, LIQUID FILLED ORAL at 18:08

## 2019-02-13 RX ADMIN — FAMOTIDINE 20 MG: 20 TABLET ORAL at 18:08

## 2019-02-13 RX ADMIN — HEPARIN SODIUM 5000 UNITS: 5000 INJECTION INTRAVENOUS; SUBCUTANEOUS at 08:50

## 2019-02-13 NOTE — PROGRESS NOTES
Shift assessment completed. Pt alert and oriented x4. Lungs CTA with even and unlabored respirations. Heart sounds regular. Active bowel sounds with soft and tender abdomen. Skin warm and dry. IV c/d and infusing. Pt reports no pain or other needs at this time. Ambulating to bathroom. Bed locked and low with call light in reach.

## 2019-02-13 NOTE — PROGRESS NOTES
Shift assessment completed via doc flow sheet. Pt A & O x 4. Lungs CTA, HR WNL, NSR. Abdomen soft and non tender. No c/o pain at this time. IVS c/d/i, no s/sx of infection/infiltration noted. Pt able to make needs known. No concerns at this time. Bed low and locked. Call light within reach. Will continue to monitor.

## 2019-02-13 NOTE — PROGRESS NOTES
Pascual Rm MD  
Bariatric & [facility] Surgery [facility] Endoscopy [place] [address], [address] [place] Phone [phone]   Fax [phone] Date of visit: 2019 Name: Hailey Shrestha MRN: [id number] : 1955 Age: 70 y.o. Sex: male Subjective: Hailey Shrestha is a 70 y.o. male  who presents to the ER complaining of LLQ pain that started last night. Pain is sharp 9/10 in intensity. Pain is worse with movement. Pain is better with pain medication and decrease to 5/10. No nausea or vomiting. No fever or chills. He does reports some constipation. Last BM was today. He was discharged about 10 days ago from his first episode of perforated diverticulitis and was doing well at home on po cipro/flagyl until the pain recurred last night. No previous abdominal surgery. 
  
Last colonoscopy was 19 - Doing well. Abd pain resolved. No nausea or vomiting. No fevers. 19 - No issues. Tolerating CLD. + BM. No N/V/ No abdominal pain 19 - Doing well. Tolerating FLD. No abd pain. +BM. Walking wo issues. MEDS: 
 
Current Facility-Administered Medications Medication  psyllium husk-aspartame (METAMUCIL FIBER) packet 1 Packet  famotidine (PEPCID) tablet 20 mg  
 docusate sodium (COLACE) capsule 100 mg  
 acetaminophen (TYLENOL) tablet 650 mg  
 oxyCODONE-acetaminophen (PERCOCET) 5-325 mg per tablet 1 Tab  sodium chloride (NS) flush 5-40 mL  sodium chloride (NS) flush 5-40 mL  ondansetron (ZOFRAN) injection 4 mg  heparin (porcine) injection 5,000 Units  ciprofloxacin (CIPRO) 400 mg IVPB (premix)  0.9% sodium chloride infusion  metroNIDAZOLE (FLAGYL) IVPB premix 500 mg ALLERGIES:    
 
No Known Allergies I/O: 
 
 
Intake/Output Summary (Last 24 hours) at 2019 1251 Last data filed at 2019 6062 Gross per 24 hour Intake  Output 1375 ml Net -1375 ml Physical Exam:  
 
Visit Vitals /82 (BP 1 Location: Left arm, BP Patient Position: At rest) Pulse 72 Temp 97.9 °F (36.6 °C) Resp 16 Ht 6' (1.829 m) Wt 230 lb (104.3 kg) SpO2 92% BMI 31.19 kg/m² General:  Well-developed, well-nourished, no distress. Psych:  Cooperative, good insight and judgement. Neuro:  Alert, oriented to person, place and time. HEENT:  Normocephalic, atraumatic. Sclera clear. Lungs:  Unlabored breathing. Symmetrical chest expansion. Chest wall:  No tenderness or deformity. Heart:  Regular rate and rhythm. No JVD. Abdomen:  Soft, non-tender, non-distended. No guarding or rebound. Extremities:  Extremities normal, atraumatic, no cyanosis or edema. Skin:  Skin color, texture, turgor normal. No rashes. Labs: All recent labs were reviewed. Normal WBC. Assessment/Plan:  Chase Estrada is a 70 y.o. male with perforated diverticulitis Pain control Abd benign DIET GI SOFT - advanced today Bowel regimen - colace IV cipro/flagyl - will arrange home IV antibiotics for total of 2 weeks since patient failed po therapy Home health consulted - will need PICC line before discharge OOB and ambulate as tolerated DVT proph - Heparin Patient will like to avoid a colostomy by all means. Will continue to attempt conservative management to try to get him through the acute phase. Will repeat CT scan tomorrow to look for resolution of free air. Kendal Pallas, MD 
Bariatric & Minimally Invasive [facility] 2/13/2019 1:01 PM

## 2019-02-14 ENCOUNTER — APPOINTMENT (OUTPATIENT)
Dept: CT IMAGING | Age: 64
DRG: 392 | End: 2019-02-14
Attending: SURGERY
Payer: COMMERCIAL

## 2019-02-14 LAB
ANION GAP SERPL CALC-SCNC: 8 MMOL/L
BUN SERPL-MCNC: 6 MG/DL (ref 8–23)
CALCIUM SERPL-MCNC: 8.6 MG/DL (ref 8.3–10.4)
CHLORIDE SERPL-SCNC: 109 MMOL/L (ref 98–107)
CO2 SERPL-SCNC: 27 MMOL/L (ref 21–32)
CREAT SERPL-MCNC: 0.78 MG/DL (ref 0.8–1.5)
GLUCOSE SERPL-MCNC: 105 MG/DL (ref 65–100)
POTASSIUM SERPL-SCNC: 3.7 MMOL/L (ref 3.5–5.1)
SODIUM SERPL-SCNC: 144 MMOL/L (ref 136–145)

## 2019-02-14 PROCEDURE — 36569 INSJ PICC 5 YR+ W/O IMAGING: CPT | Performed by: SURGERY

## 2019-02-14 PROCEDURE — 74177 CT ABD & PELVIS W/CONTRAST: CPT

## 2019-02-14 PROCEDURE — 74011250636 HC RX REV CODE- 250/636: Performed by: SURGERY

## 2019-02-14 PROCEDURE — 76937 US GUIDE VASCULAR ACCESS: CPT

## 2019-02-14 PROCEDURE — 80048 BASIC METABOLIC PNL TOTAL CA: CPT

## 2019-02-14 PROCEDURE — 74011636320 HC RX REV CODE- 636/320: Performed by: SURGERY

## 2019-02-14 PROCEDURE — 74011000258 HC RX REV CODE- 258: Performed by: SURGERY

## 2019-02-14 PROCEDURE — 74011250637 HC RX REV CODE- 250/637: Performed by: SURGERY

## 2019-02-14 PROCEDURE — C1751 CATH, INF, PER/CENT/MIDLINE: HCPCS

## 2019-02-14 PROCEDURE — 74011250637 HC RX REV CODE- 250/637: Performed by: HOSPITALIST

## 2019-02-14 PROCEDURE — 99231 SBSQ HOSP IP/OBS SF/LOW 25: CPT | Performed by: SURGERY

## 2019-02-14 PROCEDURE — 65270000029 HC RM PRIVATE

## 2019-02-14 PROCEDURE — 36415 COLL VENOUS BLD VENIPUNCTURE: CPT

## 2019-02-14 RX ORDER — SODIUM CHLORIDE 0.9 % (FLUSH) 0.9 %
10 SYRINGE (ML) INJECTION EVERY 8 HOURS
Status: DISCONTINUED | OUTPATIENT
Start: 2019-02-14 | End: 2019-02-15 | Stop reason: HOSPADM

## 2019-02-14 RX ORDER — HEPARIN 100 UNIT/ML
300 SYRINGE INTRAVENOUS EVERY 8 HOURS
Status: DISCONTINUED | OUTPATIENT
Start: 2019-02-14 | End: 2019-02-15 | Stop reason: HOSPADM

## 2019-02-14 RX ORDER — SODIUM CHLORIDE 0.9 % (FLUSH) 0.9 %
10 SYRINGE (ML) INJECTION AS NEEDED
Status: DISCONTINUED | OUTPATIENT
Start: 2019-02-14 | End: 2019-02-15 | Stop reason: HOSPADM

## 2019-02-14 RX ORDER — SODIUM CHLORIDE 0.9 % (FLUSH) 0.9 %
10 SYRINGE (ML) INJECTION
Status: COMPLETED | OUTPATIENT
Start: 2019-02-14 | End: 2019-02-14

## 2019-02-14 RX ORDER — HEPARIN 100 UNIT/ML
300 SYRINGE INTRAVENOUS AS NEEDED
Status: DISCONTINUED | OUTPATIENT
Start: 2019-02-14 | End: 2019-02-15 | Stop reason: HOSPADM

## 2019-02-14 RX ADMIN — HEPARIN SODIUM 5000 UNITS: 5000 INJECTION INTRAVENOUS; SUBCUTANEOUS at 15:59

## 2019-02-14 RX ADMIN — SODIUM CHLORIDE, PRESERVATIVE FREE 300 UNITS: 5 INJECTION INTRAVENOUS at 22:15

## 2019-02-14 RX ADMIN — Medication 10 ML: at 09:43

## 2019-02-14 RX ADMIN — METRONIDAZOLE 500 MG: 500 INJECTION, SOLUTION INTRAVENOUS at 17:19

## 2019-02-14 RX ADMIN — CIPROFLOXACIN 400 MG: 2 INJECTION, SOLUTION INTRAVENOUS at 15:59

## 2019-02-14 RX ADMIN — IOPAMIDOL 100 ML: 755 INJECTION, SOLUTION INTRAVENOUS at 09:43

## 2019-02-14 RX ADMIN — PSYLLIUM HUSK 1 PACKET: 3.4 POWDER ORAL at 22:15

## 2019-02-14 RX ADMIN — HEPARIN SODIUM 5000 UNITS: 5000 INJECTION INTRAVENOUS; SUBCUTANEOUS at 06:25

## 2019-02-14 RX ADMIN — FAMOTIDINE 20 MG: 20 TABLET ORAL at 10:46

## 2019-02-14 RX ADMIN — SODIUM CHLORIDE 50 ML/HR: 900 INJECTION, SOLUTION INTRAVENOUS at 02:38

## 2019-02-14 RX ADMIN — SODIUM CHLORIDE 100 ML: 900 INJECTION, SOLUTION INTRAVENOUS at 09:43

## 2019-02-14 RX ADMIN — Medication 10 ML: at 22:16

## 2019-02-14 RX ADMIN — Medication 10 ML: at 06:02

## 2019-02-14 RX ADMIN — METRONIDAZOLE 500 MG: 500 INJECTION, SOLUTION INTRAVENOUS at 06:02

## 2019-02-14 RX ADMIN — FAMOTIDINE 20 MG: 20 TABLET ORAL at 17:19

## 2019-02-14 RX ADMIN — Medication 10 ML: at 22:15

## 2019-02-14 RX ADMIN — DOCUSATE SODIUM 100 MG: 100 CAPSULE, LIQUID FILLED ORAL at 10:46

## 2019-02-14 RX ADMIN — DOCUSATE SODIUM 100 MG: 100 CAPSULE, LIQUID FILLED ORAL at 17:18

## 2019-02-14 RX ADMIN — DIATRIZOATE MEGLUMINE AND DIATRIZOATE SODIUM 15 ML: 660; 100 LIQUID ORAL; RECTAL at 06:25

## 2019-02-14 RX ADMIN — PSYLLIUM HUSK 1 PACKET: 3.4 POWDER ORAL at 13:00

## 2019-02-14 RX ADMIN — CIPROFLOXACIN 400 MG: 2 INJECTION, SOLUTION INTRAVENOUS at 02:38

## 2019-02-14 NOTE — PROGRESS NOTES
Shift assessment completed. Alert and oriented x4. Lungs CTA with even and unlabored respirations. Heart sounds regular. Active bowel sounds with soft and tender abdomen. Skin warm and dry. IV c/d and infusing. Pt reports no pain or other needs at this time. Pt waiting for CT ABD. Bed locked in lowest position with call light in reach.

## 2019-02-14 NOTE — PROGRESS NOTES
PICC Placement Note PRE-PROCEDURE VERIFICATION Correct Procedure: yes. Time out completed with assistant Vida Luz RN and all persons present in agreement with time out. Correct Site:  yes Temperature: Temp: 96.9 °F (36.1 °C), Temperature Source: Temp Source: Oral 
Recent Labs  
  02/14/19 
0759 BUN 6*  
CREA 0.78* Allergies: Patient has no known allergies. Education materials for [facility] given to patient or family. PROCEDURE DETAIL A single lumen PICC line was started for Home IV Therapy. The following documentation is in addition to the PICC properties in the lines/airways flowsheet : 
Lot #: [id number] 
xylocaine used: yes Mid-Arm Circumference: 29 (cm) Internal Catheter Length: 41 (cm) Internal Catheter Total Length: 41 (cm) Vein Selection for PICC:right basilic Central Line Bundle followed yes Complication Related to Insertion: none Both the insertion guidewire and ECG guidewire were removed intact all ports have positive blood return and were flush well with normal saline. The location of the tip of the PICC is verified using ECG technology. The tip is in the SVC per ECG reading. See image below. Line is okay to use: yes Nichole Leisure. Jaqueline Mathur RN VAT

## 2019-02-15 ENCOUNTER — HOME HEALTH ADMISSION (OUTPATIENT)
Dept: HOME HEALTH SERVICES | Facility: HOME HEALTH | Age: 64
End: 2019-02-15

## 2019-02-15 VITALS
HEIGHT: 72 IN | DIASTOLIC BLOOD PRESSURE: 65 MMHG | HEART RATE: 71 BPM | OXYGEN SATURATION: 95 % | BODY MASS INDEX: 31.15 KG/M2 | TEMPERATURE: 97 F | SYSTOLIC BLOOD PRESSURE: 116 MMHG | RESPIRATION RATE: 18 BRPM | WEIGHT: 230 LBS

## 2019-02-15 PROCEDURE — 74011250636 HC RX REV CODE- 250/636: Performed by: SURGERY

## 2019-02-15 PROCEDURE — 74011250637 HC RX REV CODE- 250/637: Performed by: HOSPITALIST

## 2019-02-15 PROCEDURE — 99238 HOSP IP/OBS DSCHRG MGMT 30/<: CPT | Performed by: SURGERY

## 2019-02-15 PROCEDURE — 74011250637 HC RX REV CODE- 250/637: Performed by: SURGERY

## 2019-02-15 RX ORDER — METRONIDAZOLE 500 MG/100ML
500 INJECTION, SOLUTION INTRAVENOUS EVERY 12 HOURS
Qty: 1800 ML | Refills: 0 | Status: SHIPPED | OUTPATIENT
Start: 2019-02-15 | End: 2019-02-24

## 2019-02-15 RX ORDER — CIPROFLOXACIN 2 MG/ML
400 INJECTION, SOLUTION INTRAVENOUS EVERY 12 HOURS
Qty: 3600 ML | Refills: 0 | Status: SHIPPED | OUTPATIENT
Start: 2019-02-15 | End: 2019-02-24

## 2019-02-15 RX ADMIN — PSYLLIUM HUSK 1 PACKET: 3.4 POWDER ORAL at 10:56

## 2019-02-15 RX ADMIN — HEPARIN SODIUM 5000 UNITS: 5000 INJECTION INTRAVENOUS; SUBCUTANEOUS at 00:09

## 2019-02-15 RX ADMIN — HEPARIN SODIUM 5000 UNITS: 5000 INJECTION INTRAVENOUS; SUBCUTANEOUS at 08:02

## 2019-02-15 RX ADMIN — CIPROFLOXACIN 400 MG: 2 INJECTION, SOLUTION INTRAVENOUS at 03:47

## 2019-02-15 RX ADMIN — Medication 10 ML: at 05:45

## 2019-02-15 RX ADMIN — FAMOTIDINE 20 MG: 20 TABLET ORAL at 08:02

## 2019-02-15 RX ADMIN — DOCUSATE SODIUM 100 MG: 100 CAPSULE, LIQUID FILLED ORAL at 08:02

## 2019-02-15 RX ADMIN — METRONIDAZOLE 500 MG: 500 INJECTION, SOLUTION INTRAVENOUS at 05:46

## 2019-02-15 NOTE — PROGRESS NOTES
Shift assessment complete. Patient A&O x 4. Respirations present, even and unlabored. Breath sounds clear. Patient on room air. Heart sounds regular. Bowel sounds active in all 4 quadrants. Abdomen soft and tender. Patient needs met. No distress noted. Bed low and locked. Call light within reach. Will continue to monitor hourly during shift.

## 2019-02-15 NOTE — PROGRESS NOTES
Spoke to MD Marlin Wolff regarding pt's discharge. MD states pt is ok to discharge once home health and case management consults have been completed. Will notify MD when consults are complete.

## 2019-02-15 NOTE — PROGRESS NOTES
Shift assessment completed. Pt alert and oriented x4. Lungs CTA with even and unlabored respirations. Heart sounds regular. Active bowel sounds with soft and tender abdomen. Skin warm and dry. R PICC line c/d and infusing. Pt reports no pain or other needs at this time. Bed locked in lowest position with call light in reach.

## 2019-02-15 NOTE — DISCHARGE SUMMARY
Physician Discharge Summary     Patient ID:  Jc Vargas  [id number]  70 y.o.  1955    Allergies: Patient has no known allergies. Admit Date: 2/10/2019    Discharge Date: 2/15/2019    * Admission Diagnoses: Diverticulitis of colon with perforation [K57.20]    * Discharge Diagnoses:    Hospital Problems as of 2/15/2019 Date Reviewed: 2/7/2019          Codes Class Noted - Resolved POA    Diverticulitis of colon with perforation ICD-10-CM: K57.20  ICD-9-CM: 562.11  1/24/2019 - Present Unknown               Admission Condition: Stable    * Discharge Condition: improved    * Procedures: * No surgery found [facility] Course:   Normal hospital course for this procedure. Jc Vargas is a 70 y.o. male  who presents to the ER complaining of LLQ pain that started last night. Pain is sharp 9/10 in intensity. Pain is worse with movement. Pain is better with pain medication and decrease to 5/10. No nausea or vomiting. No fever or chills. He does reports some constipation. Last BM was today. Coco Branch was discharged about 10 days ago from his first episode of perforated diverticulitis and was doing well at home on po cipro/flagyl until the pain recurred last night. No previous abdominal surgery.     Last colonoscopy was March 2018 02/11/19 - Doing well. Abd pain resolved. No nausea or vomiting. No fevers.      02/12/19 - No issues. Tolerating CLD. + BM. No N/V/ No abdominal pain      02/13/19 - Doing well. Tolerating FLD. No abd pain. +BM. Walking wo issues.      02/14/19 - No issues. Tolerating soft diet. +BMs. No N/V. No abd pain.     02/15/19 - Doing well. No abd pain. +BMs. Tolerating soft diet. CT scan done yesterday and looks improved. No free air. Inflammation better. PICC line done. Consults: None    Significant Diagnostic Studies: labs: Normal WBC and radiology: CT scan: below.   Ct Abd Pelv W Cont    Result Date: 2/14/2019  IMPRESSION:  Near total resolution of the acute inflammatory changes of acute sigmoid diverticulitis with some minor stranding densities remaining. The free air has been resorbed. Ct Abd Pelv W Cont    Result Date: 2/12/2019  IMPRESSION: Diverticulitis in the left lower quadrant with scattered free intraperitoneal air. Findings suggest recurrent perforated diverticulitis. Surgical follow-up is recommended. Findings were discussed with the ordering physician in the emergency department who was caring for the patient. * Disposition: Home    Discharge Medications:   Current Discharge Medication List      START taking these medications    Details   ciprofloxacin in D5W (CIPRO) 400 mg/200 mL pgbk 200 mL by IntraVENous route every twelve (12) hours every twelve (12) hours for 9 days. Qty: 3600 mL, Refills: 0      metroNIDAZOLE (FLAGYL) 100 mL by IntraVENous route every twelve (12) hours every twelve (12) hours for 9 days. Qty: 1800 mL, Refills: 0         CONTINUE these medications which have NOT CHANGED    Details   naproxen (NAPROSYN) 500 mg tablet Take 1 Tab by mouth two (2) times daily (with meals). Qty: 28 Tab, Refills: 0    Associated Diagnoses: Bilateral shoulder pain, unspecified chronicity      fenofibric acid (TRILIPIX) 135 mg capsule Take 1 Cap by mouth daily. Qty: 90 Cap, Refills: 3    Associated Diagnoses: Hyperlipidemia, mixed      simvastatin (ZOCOR) 20 mg tablet Take 1 Tab by mouth nightly. Qty: 90 Tab, Refills: 3    Associated Diagnoses: Hyperlipidemia, mixed      sildenafil citrate (VIAGRA) 50 mg tablet Take one tablet one hour before intercourse  Qty: 9 Tab, Refills: 11    Associated Diagnoses: Impotence      aspirin delayed-release 81 mg tablet Take 81 mg by mouth daily. * Follow-up Care/Patient Instructions:   Activity: Activity as tolerated  Diet: Low residue, high fiber diet  Wound Care: None needed  Follow up with me on Fri, Feb 22    Follow-up Information     Follow up With Specialties Details Why Contact Info    Yun Bush MD Surgery Schedule an appointment as soon as possible for a visit in 1 week Fri, Feb 22 [address]  [phone]          Follow-up tests/labs None    Signed:  Pati Clark MD  2/15/2019  8:43 AM

## 2019-02-15 NOTE — PROGRESS NOTES
Kendal Pallas, MD  
Bariatric & Advanced Laparoscopic Surgery [facility] Endoscopy [place] [address], [address] [place] Phone [phone]   Fax [phone] Date of visit: 2/15/2019 Name: Chase Estrada MRN: [id number] : 1955 Age: 70 y.o. Sex: male Subjective: Chase Estrada is a 70 y.o. male  who presents to the ER complaining of LLQ pain that started last night. Pain is sharp 9/10 in intensity. Pain is worse with movement. Pain is better with pain medication and decrease to 5/10. No nausea or vomiting. No fever or chills. He does reports some constipation. Last BM was today. He was discharged about 10 days ago from his first episode of perforated diverticulitis and was doing well at home on po cipro/flagyl until the pain recurred last night. No previous abdominal surgery. 
  
Last colonoscopy was 19 - Doing well. Abd pain resolved. No nausea or vomiting. No fevers. 19 - No issues. Tolerating CLD. + BM. No N/V/ No abdominal pain 19 - Doing well. Tolerating FLD. No abd pain. +BM. Walking wo issues. 19 - No issues. Tolerating soft diet. +BMs. No N/V. No abd pain. 02/15/19 - Doing well. No abd pain. +BMs. Tolerating soft diet. CT scan done yesterday and looks improved. No free air. Inflammation better. PICC line done. MEDS: 
 
Current Facility-Administered Medications Medication  sodium chloride (NS) flush 10 mL  heparin (porcine) pf 300 Units  sodium chloride (NS) flush 10 mL  heparin (porcine) pf 300 Units  psyllium husk-aspartame (METAMUCIL FIBER) packet 1 Packet  famotidine (PEPCID) tablet 20 mg  
 docusate sodium (COLACE) capsule 100 mg  
 acetaminophen (TYLENOL) tablet 650 mg  
 oxyCODONE-acetaminophen (PERCOCET) 5-325 mg per tablet 1 Tab  sodium chloride (NS) flush 5-40 mL  sodium chloride (NS) flush 5-40 mL  ondansetron (ZOFRAN) injection 4 mg  heparin (porcine) injection 5,000 Units  ciprofloxacin (CIPRO) 400 mg IVPB (premix)  0.9% sodium chloride infusion  metroNIDAZOLE (FLAGYL) IVPB premix 500 mg ALLERGIES:    
 
No Known Allergies I/O: 
 
 
Intake/Output Summary (Last 24 hours) at 2/15/2019 9453 Last data filed at 2/15/2019 0715 Gross per 24 hour Intake 4375 ml Output 600 ml Net 3775 ml Physical Exam:  
 
Visit Vitals /82 (BP 1 Location: Left arm, BP Patient Position: At rest;Supine) Pulse 62 Temp 98 °F (36.7 °C) Resp 18 Ht 6' (1.829 m) Wt 230 lb (104.3 kg) SpO2 96% BMI 31.19 kg/m² General:  Well-developed, well-nourished, no distress. Psych:  Cooperative, good insight and judgement. Neuro:  Alert, oriented to person, place and time. HEENT:  Normocephalic, atraumatic. Sclera clear. Lungs:  Unlabored breathing. Symmetrical chest expansion. Chest wall:  No tenderness or deformity. Heart:  Regular rate and rhythm. No JVD. Abdomen:  Soft, non-tender, non-distended. No guarding or rebound. Extremities:  Extremities normal, atraumatic, no cyanosis or edema. Skin:  Skin color, texture, turgor normal. No rashes. Labs: All recent labs were reviewed. Normal WBC. Assessment/Plan:  Doretta Boeck is a 70 y.o. male with perforated diverticulitis Pain control Abd benign DIET GI SOFT - tolerating Bowel regimen - colace IV cipro/flagyl - will arrange home IV antibiotics for total of 9 additional days Home health consulted - will need PICC line before discharge - ordered OOB and ambulate as tolerated DVT proph - Heparin Patient will like to avoid a colostomy by all means. Will continue to attempt conservative management to try to get him through the acute phase. Home IV antibiotic since patient failed oral therapy. Offered patient to continue follow up with his original surgeon, Dr. Michael Berrios but he decided he would like to follow up with me instead.  Arrange follow up for Fri, Feb 22. Va Macias MD 
Bariatric & Minimally Invasive Surgery [address] [facility] 2/15/2019 8:39 AM

## 2019-02-15 NOTE — PROGRESS NOTES
Notified MD Omar Hernandez regarding update about pt's case management and home health consult being completed. Verbal orders for discharge given.

## 2019-02-15 NOTE — DISCHARGE INSTRUCTIONS
Take Colace 100mg twice a day (over the counter). Take Metamucil twice a day. Avoid foods with seeds and high residue foods. Follow up with Dr Andres Dugan on Fri, Feb 22      DISCHARGE SUMMARY from Nurse    PATIENT INSTRUCTIONS:    After general anesthesia or intravenous sedation, for 24 hours or while taking prescription Narcotics:  · Limit your activities  · Do not drive and operate hazardous machinery  · Do not make important personal or business decisions  · Do  not drink alcoholic beverages  · If you have not urinated within 8 hours after discharge, please contact your surgeon on call. Report the following to your surgeon:  · Excessive pain, swelling, redness or odor of or around the surgical area  · Temperature over 100.5  · Nausea and vomiting lasting longer than 4 hours or if unable to take medications  · Any signs of decreased circulation or nerve impairment to extremity: change in color, persistent  numbness, tingling, coldness or increase pain  · Any questions    What to do at Home:  Recommended activity: Activity as tolerated, see above instructions from MD    If you experience any of the following symptoms severe pain, nausea/vomiting, fever or other s/s of infection, other concerns, please follow up with surgeon, return to ER. *  Please give a list of your current medications to your Primary Care Provider. *  Please update this list whenever your medications are discontinued, doses are      changed, or new medications (including over-the-counter products) are added. *  Please carry medication information at all times in case of emergency situations. These are general instructions for a healthy lifestyle:    No smoking/ No tobacco products/ Avoid exposure to second hand smoke  Surgeon General's Warning:  Quitting smoking now greatly reduces serious risk to your health.     Obesity, smoking, and sedentary lifestyle greatly increases your risk for illness    A healthy diet, regular physical exercise & weight monitoring are important for maintaining a healthy lifestyle    You may be retaining fluid if you have a history of heart failure or if you experience any of the following symptoms:  Weight gain of 3 pounds or more overnight or 5 pounds in a week, increased swelling in our hands or feet or shortness of breath while lying flat in bed. Please call your doctor as soon as you notice any of these symptoms; do not wait until your next office visit. Recognize signs and symptoms of STROKE:    F-face looks uneven    A-arms unable to move or move unevenly    S-speech slurred or non-existent    T-time-call 911 as soon as signs and symptoms begin-DO NOT go       Back to bed or wait to see if you get better-TIME IS BRAIN. Warning Signs of HEART ATTACK     Call 911 if you have these symptoms:   Chest discomfort. Most heart attacks involve discomfort in the center of the chest that lasts more than a few minutes, or that goes away and comes back. It can feel like uncomfortable pressure, squeezing, fullness, or pain.  Discomfort in other areas of the upper body. Symptoms can include pain or discomfort in one or both arms, the back, neck, jaw, or stomach.  Shortness of breath with or without chest discomfort.  Other signs may include breaking out in a cold sweat, nausea, or lightheadedness. Don't wait more than five minutes to call 911 - MINUTES MATTER! Fast action can save your life. Calling 911 is almost always the fastest way to get lifesaving treatment. Emergency Medical Services staff can begin treatment when they arrive -- up to an hour sooner than if someone gets to the hospital by car. The discharge information has been reviewed with the patient. The patient verbalized understanding.   Discharge medications reviewed with the patient and appropriate educational materials and side effects teaching were provided. ___________________________________________________________________________________________________________________________________     Diverticulitis: Care Instructions  Your Care Instructions    Diverticulitis occurs when pouches form in the wall of the colon and become inflamed or infected. It can be very painful. Doctors aren't sure what causes diverticulitis. There is no proof that foods such as nuts, seeds, or berries cause it or make it worse. A low-fiber diet may cause the colon to work harder to push stool forward. Pouches may form because of this extra work. It may be hard to think about healthy eating while you're in pain. But as you recover, you might think about how you can use healthy eating for overall better health. Healthy eating may help you avoid future attacks. Follow-up care is a key part of your treatment and safety. Be sure to make and go to all appointments, and call your doctor if you are having problems. It's also a good idea to know your test results and keep a list of the medicines you take. How can you care for yourself at home? · Drink plenty of fluids, enough so that your urine is light yellow or clear like water. If you have kidney, heart, or liver disease and have to limit fluids, talk with your doctor before you increase the amount of fluids you drink. · Stick to liquids or a bland diet (plain rice, bananas, dry toast or crackers, applesauce) until you are feeling better. Then you can return to regular foods and gradually increase the amount of fiber in your diet. · Use a heating pad set on low on your belly to relieve mild cramps and pain. · Get extra rest until you are feeling better. · Be safe with medicines. Read and follow all instructions on the label. ? If the doctor gave you a prescription medicine for pain, take it as prescribed. ? If you are not taking a prescription pain medicine, ask your doctor if you can take an over-the-counter medicine.   · If your doctor prescribed antibiotics, take them as directed. Do not stop taking them just because you feel better. You need to take the full course of antibiotics. To prevent future attacks of diverticulitis  · Avoid constipation:  ? Include fruits, vegetables, beans, and whole grains in your diet each day. These foods are high in fiber. ? Drink plenty of fluids, enough so that your urine is light yellow or clear like water. If you have kidney, heart, or liver disease and have to limit fluids, talk with your doctor before you increase the amount of fluids you drink. ? Get some exercise every day. Build up slowly to 30 to 60 minutes a day on 5 or more days of the week. ? Take a fiber supplement, such as Citrucel or Metamucil, every day if needed. Read and follow all instructions on the label. ? Schedule time each day for a bowel movement. Having a daily routine may help. Take your time and do not strain when having a bowel movement. When should you call for help? Call your doctor now or seek immediate medical care if:    · You have a fever.     · You are vomiting.     · You have new or worse belly pain.     · You cannot pass stools or gas.    Watch closely for changes in your health, and be sure to contact your doctor if you have any problems. Where can you learn more? Go to http://denny-saeed.info/. Enter H901 in the search box to learn more about \"Diverticulitis: Care Instructions. \"  Current as of: March 27, 2018  Content Version: 11.9  © 1221-6178 [employer]. Care instructions adapted under license by [employer] (which disclaims liability or warranty for this information). If you have questions about a medical condition or this instruction, always ask your healthcare professional. [place] [address] any warranty or liability for your use of this information.        Low-Fiber Diet: Care Instructions  Your Care Instructions    When your bowels are irritated, you may need to limit fiber in your diet until the problem clears up. Your doctor and dietitian can help you design a low-fiber diet based on your health and what you prefer to eat. Ask your doctor how long you should stay on a low-fiber diet. Your doctor probably will have you start adding more fiber to your diet as you get better. Always talk with your doctor or dietitian before you make changes in your diet. Follow-up care is a key part of your treatment and safety. Be sure to make and go to all appointments, and call your doctor if you are having problems. It's also a good idea to know your test results and keep a list of the medicines you take. How can you care for yourself at home? · Choose white or refined grains, and avoid whole grains. That means eating white or refined cereals, breads, crackers, rice, or pasta. · Peel the skin from fruits and vegetables before you eat or cook them. Avoid eating skins, seeds, and hulls. ? Eat frozen or canned fruit. Low-fiber fruits include applesauce, ripe bananas, and fruit juice without pulp. ? Eat low-fiber vegetables, which include well-cooked vegetables and vegetable juice. · Drink or eat milk, yogurt, or other milk products, if you can digest dairy without too many problems. Your doctor may limit milk and milk products for a while. If so, he or she may recommend a calcium and vitamin D supplement. · Eat well-cooked meat, such as chicken, turkey, fish, or lean meat. You also can eat eggs and tofu. · Avoid these foods:  ? Bran, brown or wild rice, oatmeal, granola, corn, chantel crackers, barley, and whole wheat and other whole-grain breads, such as rye bread  ? Cereals with more than 3 grams of fiber a serving  ? Berries, rhubarb, prunes, prune juice, and all dried fruits  ? Raw vegetables  ? Cabbage, broccoli, brussels sprouts, and cauliflower  ? Cooked dried beans, lentils, and split peas  ? Crunchy peanut butter  ?  Ice cream with fruit pieces in it  ? Coconut, nuts, popcorn, raisins, and seeds, or any ice cream, yogurt, or cheese with these in them  Where can you learn more? Go to http://denny-saeed.info/. Enter E618 in the search box to learn more about \"Low-Fiber Diet: Care Instructions. \"  Current as of: March 28, 2018  Content Version: 11.9  © 0943-4471 [employer]. Care instructions adapted under license by [employer] (which disclaims liability or warranty for this information). If you have questions about a medical condition or this instruction, always ask your healthcare professional. [address] any warranty or liability for your use of this information.

## 2019-02-21 NOTE — H&P (VIEW-ONLY)
Norma Lackey MD   Bariatric & Advanced [facility]aroscopic Surgery [facility] Endoscopy  [address] [place] [address] [place] [address]  Phone [phone]   Fax [phone]      Date of visit: 2019      Primary/Requesting provider: Ricky Lopez MD         Name: Rachid Hopson      MRN: [id number]       : 1955       Age: 70 y.o. Sex: male        PCP: Ricky Lopez MD     CC:    Chief Complaint   Patient presents with    Diverticulitis       HPI:     Rachid Hopson is a 70 y.o. male who presents for evaluation of perforated diverticulitis. He is here for follow up after his recent hospital stay. He continues on IV cipro/flagyl. He is taking colace and metamucil daily. His LLQ abdominal pain has improved. Pain is worse with pressure on his LLQ. Pain is better is he has daily BMs. No nausea or vomiting. No diarrhea or constipation. PMH:    Past Medical History:   Diagnosis Date    Anxiety and depression     Depression     Dyslipidemia     Fatty liver     GERD (gastroesophageal reflux disease)     Hyperlipidemia, mixed 2015    Obesity        PSH:    Past Surgical History:   Procedure Laterality Date    HX COLONOSCOPY  2006    HX COLONOSCOPY  2018    Dr Danay Castle    HX ENDOSCOPY      HX ORTHOPAEDIC      lt knee artho   Vanessa Otto HX REFRACTIVE SURGERY      HX TONSILLECTOMY      as a child       MEDS:    Current Outpatient Medications   Medication Sig    neomycin (MYCIFRADIN) 500 mg tablet Take 2 Tabs by mouth four (4) times daily. Take 1000mg at 1pm, 2 pm and 10 pm the day prior to the procedure    metroNIDAZOLE (FLAGYL) 500 mg tablet Take 1 Tab by mouth three (3) times daily. Take 500mg at 1pm, 2 pm and 10 pm the day prior to the procedure    ciprofloxacin in D5W (CIPRO) 400 mg/200 mL pgbk 200 mL by IntraVENous route every twelve (12) hours every twelve (12) hours for 9 days.     metroNIDAZOLE (FLAGYL) 100 mL by IntraVENous route every twelve (12) hours every twelve (12) hours for 9 days.  fenofibric acid (TRILIPIX) 135 mg capsule Take 1 Cap by mouth daily.  simvastatin (ZOCOR) 20 mg tablet Take 1 Tab by mouth nightly.  sildenafil citrate (VIAGRA) 50 mg tablet Take one tablet one hour before intercourse    naproxen (NAPROSYN) 500 mg tablet Take 1 Tab by mouth two (2) times daily (with meals).  aspirin delayed-release 81 mg tablet Take 81 mg by mouth daily. No current facility-administered medications for this visit. ALLERGIES:      No Known Allergies    SH:    Social History     Tobacco Use    Smoking status: Never Smoker    Smokeless tobacco: Never Used   Substance Use Topics    Alcohol use: Yes     Comment: occassionally    Drug use: Not on file       FH:    Family History   Problem Relation Age of Onset    Cancer Mother         Leukemia    Cancer Father         Colon, Prostate, & squamous cell    Heart Disease Father         CHF    Diabetes Father         Type II (NIDDM)    Cancer Sister         Lung    Cancer Brother         Colon       Review of systems:  Review of Systems   Constitutional: Negative. HENT: Negative. Eyes:        Glasses   Respiratory: Negative for cough, hemoptysis, sputum production and shortness of breath. Cardiovascular: Negative for chest pain, palpitations, orthopnea, claudication, leg swelling and PND. Elevated cholesterol   Gastrointestinal: Negative for abdominal pain, blood in stool, constipation, diarrhea, heartburn, nausea and vomiting. Diverticulitis   Genitourinary: Negative. Musculoskeletal:        Bilateral shoulder pain   Skin: Negative. Neurological: Negative for seizures and loss of consciousness. Endo/Heme/Allergies:        Pt has a pic line right arm   Psychiatric/Behavioral: Positive for depression. Negative for hallucinations, substance abuse and suicidal ideas. The patient is not nervous/anxious.           Physical Exam:     Visit Vitals  /71   Pulse 70   Ht 6' (1.829 m)   Wt 229 lb (103.9 kg)   BMI 31.06 kg/m²       General:  Well-developed, well-nourished, no distress. Psych:  Cooperative, good insight and judgement. Neuro:  Alert, oriented to person, place and time. HEENT:  Normocephalic, atraumatic. Sclera clear. Lungs:  Unlabored breathing. Symmetrical chest expansion. Chest wall:  No tenderness or deformity. Heart:  Regular rate and rhythm. No JVD. Abdomen:  Soft, non-tender, non-distended. No guarding or rebound. No palpable masses. Extremities:  Extremities normal, atraumatic, no cyanosis or edema. Right arm PICC line in placed. Skin:  Skin color, texture, turgor normal. No rashes. Labs: All recent labs were reviewed. No leukocytosis. Imaging: CT images were independently reviewed by me. Ct Abd Pelv W Cont    Result Date: 2/14/2019  IMPRESSION:  Near total resolution of the acute inflammatory changes of acute sigmoid diverticulitis with some minor stranding densities remaining. The free air has been resorbed. Ct Abd Pelv W Cont    Result Date: 2/12/2019  IMPRESSION: Diverticulitis in the left lower quadrant with scattered free intraperitoneal air. Findings suggest recurrent perforated diverticulitis. Surgical follow-up is recommended. Findings were discussed with the ordering physician in the emergency department who was caring for the patient. ICD-10-CM ICD-9-CM    1. Diverticulitis of large intestine with perforation without bleeding K57.20 562.11      569.83        There are no active hospital problems to display for this patient. Assessment/Plan:  Ronda Eason is a 70 y.o. male who has signs and symptoms consistent with perforated diverticulitis    Continue IV antibiotics for a total of 14 days of IV antibiotics  Continue daily colace and metamucil  Will schedule for Robotic LAR, splenic flexure mobilization the 2nd week of March.  I discussed with him the risks and benefits of surgery including the possibility of an ostomy and he agreed to proceed.    He will receive an antibiotic and mechanical prep prior to surgery  Neomycin/Flagyl po prescribed for 1pm, 2pm and 10pm day prior to surgery  RTC in Mar 5 - to make sure symptoms have not worsen - he may need IV antibiotic until day of surgery if symptoms recur    Signed: Richard Victoria MD  Bariatric & Minimally Invasive Surgery  2/21/2019 9:11 AM

## 2019-03-12 ENCOUNTER — HOSPITAL ENCOUNTER (OUTPATIENT)
Dept: SURGERY | Age: 64
Discharge: HOME | End: 2019-03-12
Payer: COMMERCIAL

## 2019-03-12 VITALS
SYSTOLIC BLOOD PRESSURE: 114 MMHG | TEMPERATURE: 98.1 F | HEIGHT: 72 IN | DIASTOLIC BLOOD PRESSURE: 78 MMHG | RESPIRATION RATE: 6 BRPM | BODY MASS INDEX: 29.87 KG/M2 | WEIGHT: 220.56 LBS | OXYGEN SATURATION: 97 % | HEART RATE: 66 BPM

## 2019-03-12 LAB
ANION GAP SERPL CALC-SCNC: 5 MMOL/L (ref 7–16)
BUN SERPL-MCNC: 12 MG/DL (ref 8–23)
CALCIUM SERPL-MCNC: 9.1 MG/DL (ref 8.3–10.4)
CHLORIDE SERPL-SCNC: 107 MMOL/L (ref 98–107)
CO2 SERPL-SCNC: 29 MMOL/L (ref 21–32)
CREAT SERPL-MCNC: 0.82 MG/DL (ref 0.8–1.5)
ERYTHROCYTE [DISTWIDTH] IN BLOOD BY AUTOMATED COUNT: 13.2 % (ref 11.9–14.6)
GLUCOSE SERPL-MCNC: 95 MG/DL (ref 65–100)
HCT VFR BLD AUTO: 44.8 % (ref 41.1–50.3)
HGB BLD-MCNC: 14.7 G/DL (ref 13.6–17.2)
MCH RBC QN AUTO: 26.9 PG (ref 26.1–32.9)
MCHC RBC AUTO-ENTMCNC: 32.8 G/DL (ref 31.4–35)
MCV RBC AUTO: 82.1 FL (ref 79.6–97.8)
NRBC # BLD: 0 K/UL (ref 0–0.2)
PLATELET # BLD AUTO: 233 K/UL (ref 150–450)
PMV BLD AUTO: 10.8 FL (ref 9.4–12.3)
POTASSIUM SERPL-SCNC: 4 MMOL/L (ref 3.5–5.1)
RBC # BLD AUTO: 5.46 M/UL (ref 4.23–5.6)
SODIUM SERPL-SCNC: 141 MMOL/L (ref 136–145)
WBC # BLD AUTO: 5.7 K/UL (ref 4.3–11.1)

## 2019-03-12 PROCEDURE — 85027 COMPLETE CBC AUTOMATED: CPT

## 2019-03-12 PROCEDURE — 80048 BASIC METABOLIC PNL TOTAL CA: CPT

## 2019-03-12 NOTE — PERIOP NOTES
Patient verified name and . Patient provided medical/health information and PTA medications to the best of their ability. TYPE  CASE:3  Order for consent WAS NOT found in EHR / PT CONFIRMED SURG   Labs per [facility] [address] per anesthesia protocol: CBC, BMP-- THESE DRAWN TODAY AT P.A.T. AND SENT TO LAB--- WILL NEED TYPE AND SCREEN DOS-- ORDER PLACED IN CC  EKG  :  2018 IN CC IF NEEDED AS BASELINE  SURG IN 5 HOURS-- SPOKE TO  [facility] [place] PHONE-- OK WITH SEEING PT DOS-- PT ALSO OK WITH THIS  Patient provided with and instructed on education handouts including Guide to Surgery, blood transfusions, pain management, and hand hygiene for the family and community, and List of [place] [facility] according to the [facility] brochure. DAWNMIST AND HIBICLENS and instructions given per hospital policy. Instructed patient to continue previous medications as prescribed prior to surgery unless otherwise directed and to take the following medications the day of surgery according to anesthesia guidelines : NONE . Instructed patient to hold  the following medications: PER DR KELLY ASA 81 MG, ALEVE--- NO HX OF CARDIAC DSE/ STROKE. Original medication prescription bottles WAS NOT visualized during patient appointment. Patient teach back successful and patient demonstrates knowledge of instruction.

## 2019-03-18 ENCOUNTER — ANESTHESIA EVENT (OUTPATIENT)
Dept: SURGERY | Age: 64
DRG: 331 | End: 2019-03-18
Payer: COMMERCIAL

## 2019-03-19 ENCOUNTER — HOSPITAL ENCOUNTER (INPATIENT)
Age: 64
LOS: 4 days | Discharge: HOME | DRG: 331 | End: 2019-03-23
Attending: SURGERY | Admitting: SURGERY
Payer: COMMERCIAL

## 2019-03-19 ENCOUNTER — ANESTHESIA (OUTPATIENT)
Dept: SURGERY | Age: 64
DRG: 331 | End: 2019-03-19
Payer: COMMERCIAL

## 2019-03-19 DIAGNOSIS — K57.92 DIVERTICULITIS: ICD-10-CM

## 2019-03-19 DIAGNOSIS — K57.92 ACUTE DIVERTICULITIS: ICD-10-CM

## 2019-03-19 DIAGNOSIS — K57.80 DIVERTICULAR DISEASE OF INTESTINE WITH PERFORATION AND ABSCESS: ICD-10-CM

## 2019-03-19 LAB
ABO + RH BLD: NORMAL
APTT PPP: 31.4 SEC (ref 24.7–39.8)
BLOOD GROUP ANTIBODIES SERPL: NORMAL
HCT VFR BLD AUTO: 42.9 % (ref 41.1–50.3)
HGB BLD-MCNC: 14.2 G/DL (ref 13.6–17.2)
INR PPP: 1.1
PROTHROMBIN TIME: 13.8 SEC (ref 11.7–14.5)
SPECIMEN EXP DATE BLD: NORMAL

## 2019-03-19 PROCEDURE — 74011250636 HC RX REV CODE- 250/636: Performed by: SURGERY

## 2019-03-19 PROCEDURE — 77030032490 HC SLV COMPR SCD KNE COVD -B: Performed by: SURGERY

## 2019-03-19 PROCEDURE — 65270000029 HC RM PRIVATE

## 2019-03-19 PROCEDURE — 77030035277 HC OBTRTR BLDELSS DISP INTU -B: Performed by: SURGERY

## 2019-03-19 PROCEDURE — 77030008771 HC TU NG SALEM SUMP -A: Performed by: ANESTHESIOLOGY

## 2019-03-19 PROCEDURE — 77030013292 HC BOWL MX PRSM J&J -A: Performed by: ANESTHESIOLOGY

## 2019-03-19 PROCEDURE — 77030010285 HC STPLR INT PRSTRNG COVD -B: Performed by: SURGERY

## 2019-03-19 PROCEDURE — 77030037088 HC TUBE ENDOTRACH ORAL NSL COVD-A: Performed by: ANESTHESIOLOGY

## 2019-03-19 PROCEDURE — 77030020703 HC SEAL CANN DISP INTU -B: Performed by: SURGERY

## 2019-03-19 PROCEDURE — 77030031139 HC SUT VCRL2 J&J -A: Performed by: SURGERY

## 2019-03-19 PROCEDURE — 74011000250 HC RX REV CODE- 250: Performed by: SURGERY

## 2019-03-19 PROCEDURE — 77030032522 HC SHT STPL PK ENDOWR INTU -B: Performed by: SURGERY

## 2019-03-19 PROCEDURE — 86900 BLOOD TYPING SEROLOGIC ABO: CPT

## 2019-03-19 PROCEDURE — 8E0W4CZ ROBOTIC ASSISTED PROCEDURE OF TRUNK REGION, PERCUTANEOUS ENDOSCOPIC APPROACH: ICD-10-PCS | Performed by: SURGERY

## 2019-03-19 PROCEDURE — 74011000254 HC RX REV CODE- 254

## 2019-03-19 PROCEDURE — 0DTN4ZZ RESECTION OF SIGMOID COLON, PERCUTANEOUS ENDOSCOPIC APPROACH: ICD-10-PCS | Performed by: SURGERY

## 2019-03-19 PROCEDURE — 85610 PROTHROMBIN TIME: CPT

## 2019-03-19 PROCEDURE — 36415 COLL VENOUS BLD VENIPUNCTURE: CPT

## 2019-03-19 PROCEDURE — 77030008522 HC TBNG INSUF LAPRO STRY -B: Performed by: SURGERY

## 2019-03-19 PROCEDURE — 74011000250 HC RX REV CODE- 250

## 2019-03-19 PROCEDURE — 85730 THROMBOPLASTIN TIME PARTIAL: CPT

## 2019-03-19 PROCEDURE — 77030039425 HC BLD LARYNG TRULITE DISP TELE -A: Performed by: ANESTHESIOLOGY

## 2019-03-19 PROCEDURE — 77030021678 HC GLIDESCP STAT DISP VERT -B: Performed by: ANESTHESIOLOGY

## 2019-03-19 PROCEDURE — 74011250637 HC RX REV CODE- 250/637: Performed by: NURSE PRACTITIONER

## 2019-03-19 PROCEDURE — 77030020782 HC GWN BAIR PAWS FLX 3M -B: Performed by: ANESTHESIOLOGY

## 2019-03-19 PROCEDURE — 74011250636 HC RX REV CODE- 250/636

## 2019-03-19 PROCEDURE — 77030005401 HC CATH RAD ARRO -A: Performed by: ANESTHESIOLOGY

## 2019-03-19 PROCEDURE — 74011250637 HC RX REV CODE- 250/637: Performed by: ANESTHESIOLOGY

## 2019-03-19 PROCEDURE — 76010000882 HC OR TIME 5 TO 5.5HR INTENSV - TIER 2: Performed by: SURGERY

## 2019-03-19 PROCEDURE — 74011250637 HC RX REV CODE- 250/637: Performed by: SURGERY

## 2019-03-19 PROCEDURE — 77030035278 HC STPLR SEAL ENDOWR INTU -B: Performed by: SURGERY

## 2019-03-19 PROCEDURE — 76210000006 HC OR PH I REC 0.5 TO 1 HR: Performed by: SURGERY

## 2019-03-19 PROCEDURE — 77030002933 HC SUT MCRYL J&J -A: Performed by: SURGERY

## 2019-03-19 PROCEDURE — 77030019908 HC STETH ESOPH SIMS -A: Performed by: ANESTHESIOLOGY

## 2019-03-19 PROCEDURE — 77030002996 HC SUT SLK J&J -A: Performed by: SURGERY

## 2019-03-19 PROCEDURE — 76060000042 HC ANESTHESIA 5.5 TO 6 HR: Performed by: SURGERY

## 2019-03-19 PROCEDURE — 77030035489 HC REDUCR CANN ENDOWR INTU -C: Performed by: SURGERY

## 2019-03-19 PROCEDURE — 88307 TISSUE EXAM BY PATHOLOGIST: CPT

## 2019-03-19 PROCEDURE — 77030027138 HC INCENT SPIROMETER -A

## 2019-03-19 PROCEDURE — 77030016154: Performed by: SURGERY

## 2019-03-19 PROCEDURE — 77030032523 HC RELD STPL PK ENDORS INTU -C: Performed by: SURGERY

## 2019-03-19 PROCEDURE — 77030034850: Performed by: SURGERY

## 2019-03-19 PROCEDURE — 74011250636 HC RX REV CODE- 250/636: Performed by: ANESTHESIOLOGY

## 2019-03-19 PROCEDURE — 77030025303 HC STPLR ENDOSC J&J -G: Performed by: SURGERY

## 2019-03-19 PROCEDURE — 77030013794 HC KT TRNSDUC BLD EDWD -B: Performed by: ANESTHESIOLOGY

## 2019-03-19 PROCEDURE — 85018 HEMOGLOBIN: CPT

## 2019-03-19 PROCEDURE — 77030012935 HC DRSG AQUACEL BMS -B: Performed by: SURGERY

## 2019-03-19 RX ORDER — ONDANSETRON 2 MG/ML
4 INJECTION INTRAMUSCULAR; INTRAVENOUS
Status: DISCONTINUED | OUTPATIENT
Start: 2019-03-19 | End: 2019-03-23 | Stop reason: HOSPADM

## 2019-03-19 RX ORDER — ALVIMOPAN 12 MG/1
12 CAPSULE ORAL 2 TIMES DAILY
Status: DISCONTINUED | OUTPATIENT
Start: 2019-03-19 | End: 2019-03-19 | Stop reason: HOSPADM

## 2019-03-19 RX ORDER — SODIUM CHLORIDE, SODIUM LACTATE, POTASSIUM CHLORIDE, CALCIUM CHLORIDE 600; 310; 30; 20 MG/100ML; MG/100ML; MG/100ML; MG/100ML
75 INJECTION, SOLUTION INTRAVENOUS CONTINUOUS
Status: DISCONTINUED | OUTPATIENT
Start: 2019-03-19 | End: 2019-03-19 | Stop reason: HOSPADM

## 2019-03-19 RX ORDER — MIDAZOLAM HYDROCHLORIDE 1 MG/ML
2 INJECTION, SOLUTION INTRAMUSCULAR; INTRAVENOUS
Status: COMPLETED | OUTPATIENT
Start: 2019-03-19 | End: 2019-03-19

## 2019-03-19 RX ORDER — LIDOCAINE HYDROCHLORIDE 5 MG/ML
INJECTION, SOLUTION INFILTRATION; INTRAVENOUS
Status: DISCONTINUED | OUTPATIENT
Start: 2019-03-19 | End: 2019-03-19 | Stop reason: HOSPADM

## 2019-03-19 RX ORDER — OXYCODONE HYDROCHLORIDE 5 MG/1
5 TABLET ORAL
Status: COMPLETED | OUTPATIENT
Start: 2019-03-19 | End: 2019-03-19

## 2019-03-19 RX ORDER — FENTANYL CITRATE 50 UG/ML
INJECTION, SOLUTION INTRAMUSCULAR; INTRAVENOUS AS NEEDED
Status: DISCONTINUED | OUTPATIENT
Start: 2019-03-19 | End: 2019-03-19 | Stop reason: HOSPADM

## 2019-03-19 RX ORDER — SODIUM CHLORIDE 9 MG/ML
25 INJECTION, SOLUTION INTRAVENOUS AS NEEDED
Status: DISCONTINUED | OUTPATIENT
Start: 2019-03-19 | End: 2019-03-19 | Stop reason: HOSPADM

## 2019-03-19 RX ORDER — EPHEDRINE SULFATE 50 MG/ML
INJECTION, SOLUTION INTRAVENOUS AS NEEDED
Status: DISCONTINUED | OUTPATIENT
Start: 2019-03-19 | End: 2019-03-19 | Stop reason: HOSPADM

## 2019-03-19 RX ORDER — PROPOFOL 10 MG/ML
INJECTION, EMULSION INTRAVENOUS AS NEEDED
Status: DISCONTINUED | OUTPATIENT
Start: 2019-03-19 | End: 2019-03-19 | Stop reason: HOSPADM

## 2019-03-19 RX ORDER — LIDOCAINE HYDROCHLORIDE 20 MG/ML
INJECTION, SOLUTION EPIDURAL; INFILTRATION; INTRACAUDAL; PERINEURAL AS NEEDED
Status: DISCONTINUED | OUTPATIENT
Start: 2019-03-19 | End: 2019-03-19 | Stop reason: HOSPADM

## 2019-03-19 RX ORDER — SODIUM CHLORIDE 0.9 % (FLUSH) 0.9 %
5-40 SYRINGE (ML) INJECTION EVERY 8 HOURS
Status: DISCONTINUED | OUTPATIENT
Start: 2019-03-19 | End: 2019-03-19 | Stop reason: HOSPADM

## 2019-03-19 RX ORDER — ROCURONIUM BROMIDE 10 MG/ML
INJECTION, SOLUTION INTRAVENOUS AS NEEDED
Status: DISCONTINUED | OUTPATIENT
Start: 2019-03-19 | End: 2019-03-19 | Stop reason: HOSPADM

## 2019-03-19 RX ORDER — NEOSTIGMINE METHYLSULFATE 1 MG/ML
INJECTION INTRAVENOUS AS NEEDED
Status: DISCONTINUED | OUTPATIENT
Start: 2019-03-19 | End: 2019-03-19 | Stop reason: HOSPADM

## 2019-03-19 RX ORDER — SODIUM CHLORIDE 0.9 % (FLUSH) 0.9 %
5-40 SYRINGE (ML) INJECTION AS NEEDED
Status: DISCONTINUED | OUTPATIENT
Start: 2019-03-19 | End: 2019-03-19 | Stop reason: HOSPADM

## 2019-03-19 RX ORDER — HYDROMORPHONE HYDROCHLORIDE 2 MG/ML
0.5 INJECTION, SOLUTION INTRAMUSCULAR; INTRAVENOUS; SUBCUTANEOUS
Status: DISCONTINUED | OUTPATIENT
Start: 2019-03-19 | End: 2019-03-19 | Stop reason: HOSPADM

## 2019-03-19 RX ORDER — GABAPENTIN 300 MG/1
600 CAPSULE ORAL ONCE
Status: COMPLETED | OUTPATIENT
Start: 2019-03-19 | End: 2019-03-19

## 2019-03-19 RX ORDER — KETAMINE HYDROCHLORIDE 100 MG/ML
INJECTION, SOLUTION INTRAMUSCULAR; INTRAVENOUS AS NEEDED
Status: DISCONTINUED | OUTPATIENT
Start: 2019-03-19 | End: 2019-03-19 | Stop reason: HOSPADM

## 2019-03-19 RX ORDER — LIDOCAINE HYDROCHLORIDE 10 MG/ML
0.1 INJECTION INFILTRATION; PERINEURAL AS NEEDED
Status: DISCONTINUED | OUTPATIENT
Start: 2019-03-19 | End: 2019-03-19 | Stop reason: HOSPADM

## 2019-03-19 RX ORDER — SODIUM CHLORIDE 9 MG/ML
INJECTION, SOLUTION INTRAVENOUS
Status: DISCONTINUED | OUTPATIENT
Start: 2019-03-19 | End: 2019-03-19 | Stop reason: HOSPADM

## 2019-03-19 RX ORDER — ACETAMINOPHEN 500 MG
1000 TABLET ORAL ONCE
Status: COMPLETED | OUTPATIENT
Start: 2019-03-19 | End: 2019-03-19

## 2019-03-19 RX ORDER — GLYCOPYRROLATE 0.2 MG/ML
INJECTION INTRAMUSCULAR; INTRAVENOUS AS NEEDED
Status: DISCONTINUED | OUTPATIENT
Start: 2019-03-19 | End: 2019-03-19 | Stop reason: HOSPADM

## 2019-03-19 RX ORDER — ONDANSETRON 2 MG/ML
INJECTION INTRAMUSCULAR; INTRAVENOUS AS NEEDED
Status: DISCONTINUED | OUTPATIENT
Start: 2019-03-19 | End: 2019-03-19 | Stop reason: HOSPADM

## 2019-03-19 RX ORDER — FENTANYL CITRATE 50 UG/ML
100 INJECTION, SOLUTION INTRAMUSCULAR; INTRAVENOUS ONCE
Status: DISCONTINUED | OUTPATIENT
Start: 2019-03-19 | End: 2019-03-19 | Stop reason: HOSPADM

## 2019-03-19 RX ORDER — ACETAMINOPHEN 500 MG
1000 TABLET ORAL EVERY 6 HOURS
Status: DISCONTINUED | OUTPATIENT
Start: 2019-03-19 | End: 2019-03-21

## 2019-03-19 RX ORDER — BUPIVACAINE HYDROCHLORIDE 5 MG/ML
INJECTION, SOLUTION EPIDURAL; INTRACAUDAL AS NEEDED
Status: DISCONTINUED | OUTPATIENT
Start: 2019-03-19 | End: 2019-03-19 | Stop reason: HOSPADM

## 2019-03-19 RX ORDER — CELECOXIB 100 MG/1
100 CAPSULE ORAL 2 TIMES DAILY
Status: DISCONTINUED | OUTPATIENT
Start: 2019-03-20 | End: 2019-03-19

## 2019-03-19 RX ORDER — ALVIMOPAN 12 MG/1
12 CAPSULE ORAL 2 TIMES DAILY
Status: DISCONTINUED | OUTPATIENT
Start: 2019-03-20 | End: 2019-03-23 | Stop reason: HOSPADM

## 2019-03-19 RX ORDER — SODIUM CHLORIDE, SODIUM LACTATE, POTASSIUM CHLORIDE, CALCIUM CHLORIDE 600; 310; 30; 20 MG/100ML; MG/100ML; MG/100ML; MG/100ML
50 INJECTION, SOLUTION INTRAVENOUS CONTINUOUS
Status: DISPENSED | OUTPATIENT
Start: 2019-03-19 | End: 2019-03-20

## 2019-03-19 RX ORDER — CEFAZOLIN SODIUM/WATER 2 G/20 ML
2 SYRINGE (ML) INTRAVENOUS ONCE
Status: COMPLETED | OUTPATIENT
Start: 2019-03-19 | End: 2019-03-19

## 2019-03-19 RX ORDER — DEXAMETHASONE SODIUM PHOSPHATE 4 MG/ML
INJECTION, SOLUTION INTRA-ARTICULAR; INTRALESIONAL; INTRAMUSCULAR; INTRAVENOUS; SOFT TISSUE AS NEEDED
Status: DISCONTINUED | OUTPATIENT
Start: 2019-03-19 | End: 2019-03-19 | Stop reason: HOSPADM

## 2019-03-19 RX ORDER — CELECOXIB 200 MG/1
200 CAPSULE ORAL
Status: DISCONTINUED | OUTPATIENT
Start: 2019-03-19 | End: 2019-03-19 | Stop reason: HOSPADM

## 2019-03-19 RX ORDER — MIDAZOLAM HYDROCHLORIDE 1 MG/ML
2 INJECTION, SOLUTION INTRAMUSCULAR; INTRAVENOUS ONCE
Status: DISCONTINUED | OUTPATIENT
Start: 2019-03-19 | End: 2019-03-19 | Stop reason: HOSPADM

## 2019-03-19 RX ORDER — INDOCYANINE GREEN AND WATER 25 MG
KIT INJECTION AS NEEDED
Status: DISCONTINUED | OUTPATIENT
Start: 2019-03-19 | End: 2019-03-19 | Stop reason: HOSPADM

## 2019-03-19 RX ORDER — NALOXONE HYDROCHLORIDE 0.4 MG/ML
0.4 INJECTION, SOLUTION INTRAMUSCULAR; INTRAVENOUS; SUBCUTANEOUS AS NEEDED
Status: DISCONTINUED | OUTPATIENT
Start: 2019-03-19 | End: 2019-03-23 | Stop reason: HOSPADM

## 2019-03-19 RX ORDER — OXYCODONE HYDROCHLORIDE 5 MG/1
5 TABLET ORAL
Status: DISCONTINUED | OUTPATIENT
Start: 2019-03-19 | End: 2019-03-23 | Stop reason: HOSPADM

## 2019-03-19 RX ORDER — SODIUM CHLORIDE, SODIUM LACTATE, POTASSIUM CHLORIDE, CALCIUM CHLORIDE 600; 310; 30; 20 MG/100ML; MG/100ML; MG/100ML; MG/100ML
50 INJECTION, SOLUTION INTRAVENOUS CONTINUOUS
Status: DISCONTINUED | OUTPATIENT
Start: 2019-03-19 | End: 2019-03-19 | Stop reason: HOSPADM

## 2019-03-19 RX ORDER — ALBUTEROL SULFATE 0.83 MG/ML
2.5 SOLUTION RESPIRATORY (INHALATION) AS NEEDED
Status: DISCONTINUED | OUTPATIENT
Start: 2019-03-19 | End: 2019-03-19 | Stop reason: HOSPADM

## 2019-03-19 RX ORDER — ENOXAPARIN SODIUM 100 MG/ML
40 INJECTION SUBCUTANEOUS EVERY 24 HOURS
Status: DISCONTINUED | OUTPATIENT
Start: 2019-03-19 | End: 2019-03-19

## 2019-03-19 RX ADMIN — BENZOCAINE AND MENTHOL 1 LOZENGE: 15; 2.6 LOZENGE ORAL at 16:10

## 2019-03-19 RX ADMIN — EPHEDRINE SULFATE 5 MG: 50 INJECTION, SOLUTION INTRAVENOUS at 12:46

## 2019-03-19 RX ADMIN — EPHEDRINE SULFATE 10 MG: 50 INJECTION, SOLUTION INTRAVENOUS at 08:27

## 2019-03-19 RX ADMIN — ROCURONIUM BROMIDE 50 MG: 10 INJECTION, SOLUTION INTRAVENOUS at 07:56

## 2019-03-19 RX ADMIN — EPHEDRINE SULFATE 5 MG: 50 INJECTION, SOLUTION INTRAVENOUS at 12:05

## 2019-03-19 RX ADMIN — OXYCODONE HYDROCHLORIDE 5 MG: 5 TABLET ORAL at 14:30

## 2019-03-19 RX ADMIN — Medication 2 G: at 08:08

## 2019-03-19 RX ADMIN — ALVIMOPAN 12 MG: 12 CAPSULE ORAL at 06:04

## 2019-03-19 RX ADMIN — FENTANYL CITRATE 25 MCG: 50 INJECTION, SOLUTION INTRAMUSCULAR; INTRAVENOUS at 12:54

## 2019-03-19 RX ADMIN — ONDANSETRON 4 MG: 2 INJECTION INTRAMUSCULAR; INTRAVENOUS at 12:26

## 2019-03-19 RX ADMIN — LIDOCAINE HYDROCHLORIDE 90 MG/HR: 5 INJECTION, SOLUTION INFILTRATION; INTRAVENOUS at 08:13

## 2019-03-19 RX ADMIN — NEOSTIGMINE METHYLSULFATE 5 MG: 1 INJECTION INTRAVENOUS at 13:05

## 2019-03-19 RX ADMIN — FENTANYL CITRATE 25 MCG: 50 INJECTION, SOLUTION INTRAMUSCULAR; INTRAVENOUS at 12:24

## 2019-03-19 RX ADMIN — ROCURONIUM BROMIDE 10 MG: 10 INJECTION, SOLUTION INTRAVENOUS at 12:19

## 2019-03-19 RX ADMIN — GLYCOPYRROLATE 0.6 MG: 0.2 INJECTION INTRAMUSCULAR; INTRAVENOUS at 13:05

## 2019-03-19 RX ADMIN — SODIUM CHLORIDE: 9 INJECTION, SOLUTION INTRAVENOUS at 11:00

## 2019-03-19 RX ADMIN — ROCURONIUM BROMIDE 10 MG: 10 INJECTION, SOLUTION INTRAVENOUS at 10:20

## 2019-03-19 RX ADMIN — ROCURONIUM BROMIDE 20 MG: 10 INJECTION, SOLUTION INTRAVENOUS at 08:57

## 2019-03-19 RX ADMIN — SODIUM CHLORIDE, SODIUM LACTATE, POTASSIUM CHLORIDE, AND CALCIUM CHLORIDE 75 ML/HR: 600; 310; 30; 20 INJECTION, SOLUTION INTRAVENOUS at 06:10

## 2019-03-19 RX ADMIN — ROCURONIUM BROMIDE 10 MG: 10 INJECTION, SOLUTION INTRAVENOUS at 09:55

## 2019-03-19 RX ADMIN — BENZOCAINE AND MENTHOL 1 LOZENGE: 15; 2.6 LOZENGE ORAL at 18:38

## 2019-03-19 RX ADMIN — ACETAMINOPHEN 1000 MG: 500 TABLET, FILM COATED ORAL at 06:04

## 2019-03-19 RX ADMIN — SODIUM CHLORIDE: 9 INJECTION, SOLUTION INTRAVENOUS at 08:41

## 2019-03-19 RX ADMIN — EPHEDRINE SULFATE 5 MG: 50 INJECTION, SOLUTION INTRAVENOUS at 11:02

## 2019-03-19 RX ADMIN — ROCURONIUM BROMIDE 10 MG: 10 INJECTION, SOLUTION INTRAVENOUS at 12:28

## 2019-03-19 RX ADMIN — INDOCYANINE GREEN AND WATER 5 MG: KIT at 11:30

## 2019-03-19 RX ADMIN — KETAMINE HYDROCHLORIDE 20 MG: 100 INJECTION, SOLUTION INTRAMUSCULAR; INTRAVENOUS at 09:33

## 2019-03-19 RX ADMIN — ROCURONIUM BROMIDE 5 MG: 10 INJECTION, SOLUTION INTRAVENOUS at 12:01

## 2019-03-19 RX ADMIN — SODIUM CHLORIDE, SODIUM LACTATE, POTASSIUM CHLORIDE, AND CALCIUM CHLORIDE 75 ML/HR: 600; 310; 30; 20 INJECTION, SOLUTION INTRAVENOUS at 15:00

## 2019-03-19 RX ADMIN — SODIUM CHLORIDE, SODIUM LACTATE, POTASSIUM CHLORIDE, AND CALCIUM CHLORIDE: 600; 310; 30; 20 INJECTION, SOLUTION INTRAVENOUS at 11:00

## 2019-03-19 RX ADMIN — MIDAZOLAM HYDROCHLORIDE 2 MG: 2 INJECTION, SOLUTION INTRAMUSCULAR; INTRAVENOUS at 07:18

## 2019-03-19 RX ADMIN — ACETAMINOPHEN 1000 MG: 500 TABLET, FILM COATED ORAL at 23:39

## 2019-03-19 RX ADMIN — ACETAMINOPHEN 1000 MG: 500 TABLET, FILM COATED ORAL at 17:09

## 2019-03-19 RX ADMIN — Medication 2 G: at 11:38

## 2019-03-19 RX ADMIN — ROCURONIUM BROMIDE 10 MG: 10 INJECTION, SOLUTION INTRAVENOUS at 11:36

## 2019-03-19 RX ADMIN — EPHEDRINE SULFATE 5 MG: 50 INJECTION, SOLUTION INTRAVENOUS at 12:47

## 2019-03-19 RX ADMIN — KETAMINE HYDROCHLORIDE 50 MG: 100 INJECTION, SOLUTION INTRAMUSCULAR; INTRAVENOUS at 07:56

## 2019-03-19 RX ADMIN — ROCURONIUM BROMIDE 10 MG: 10 INJECTION, SOLUTION INTRAVENOUS at 10:38

## 2019-03-19 RX ADMIN — FENTANYL CITRATE 100 MCG: 50 INJECTION, SOLUTION INTRAMUSCULAR; INTRAVENOUS at 07:55

## 2019-03-19 RX ADMIN — FENTANYL CITRATE 25 MCG: 50 INJECTION, SOLUTION INTRAMUSCULAR; INTRAVENOUS at 13:07

## 2019-03-19 RX ADMIN — EPHEDRINE SULFATE 5 MG: 50 INJECTION, SOLUTION INTRAVENOUS at 11:33

## 2019-03-19 RX ADMIN — PROPOFOL 130 MG: 10 INJECTION, EMULSION INTRAVENOUS at 07:55

## 2019-03-19 RX ADMIN — KETAMINE HYDROCHLORIDE 20 MG: 100 INJECTION, SOLUTION INTRAMUSCULAR; INTRAVENOUS at 12:20

## 2019-03-19 RX ADMIN — GABAPENTIN 600 MG: 300 CAPSULE ORAL at 06:04

## 2019-03-19 RX ADMIN — ROCURONIUM BROMIDE 10 MG: 10 INJECTION, SOLUTION INTRAVENOUS at 09:32

## 2019-03-19 RX ADMIN — INDOCYANINE GREEN AND WATER 7.5 MG: KIT at 10:42

## 2019-03-19 RX ADMIN — LIDOCAINE HYDROCHLORIDE 70 MG: 20 INJECTION, SOLUTION EPIDURAL; INFILTRATION; INTRACAUDAL; PERINEURAL at 07:55

## 2019-03-19 RX ADMIN — INDOCYANINE GREEN AND WATER 12.5 MG: KIT at 08:05

## 2019-03-19 RX ADMIN — DEXAMETHASONE SODIUM PHOSPHATE 4 MG: 4 INJECTION, SOLUTION INTRA-ARTICULAR; INTRALESIONAL; INTRAMUSCULAR; INTRAVENOUS; SOFT TISSUE at 12:26

## 2019-03-19 NOTE — PROGRESS NOTES
TRANSFER - IN REPORT:    Verbal report received from [facility]) on Richelle Kauffman  being received from PACU(unit) for routine progression of care      Report consisted of patients Situation, Background, Assessment and   Recommendations(SBAR). Information from the following report(s) SBAR, Kardex and Procedure Summary was reviewed with the receiving nurse. Opportunity for questions and clarification was provided. Assessment completed upon patients arrival to unit and care assumed.

## 2019-03-19 NOTE — INTERVAL H&P NOTE
H&P Update:  Danilo Shelby was seen and examined. History and physical has been reviewed. The patient has been examined.  There have been no significant clinical changes since the completion of the originally dated History and Physical.    Signed By: Christen Hebert MD     March 19, 2019 7:43 AM

## 2019-03-19 NOTE — ANESTHESIA PROCEDURE NOTES
Arterial Line Placement    Start time: 3/19/2019 8:00 AM  End time: 3/19/2019 8:04 AM  Performed by: Charmaine Barlow MD  Authorized by: Charmaine Barlow MD     Pre-Procedure  Indications:  Arterial pressure monitoring and blood sampling  Preanesthetic Checklist: patient identified, risks and benefits discussed, anesthesia consent, patient being monitored and patient being monitored      Procedure:   Prep:  ChloraPrep  Seldinger Technique?: Yes    Orientation:  Left  Location:  Radial artery  Catheter size:  20 G  Number of attempts:  1  Cont Cardiac Output Sensor: No      Assessment:   Post-procedure:  Line secured and sterile dressing applied  Patient Tolerance:  Patient tolerated the procedure well with no immediate complications

## 2019-03-19 NOTE — ANESTHESIA POSTPROCEDURE EVALUATION
Procedure(s): 
ROBOTIC LOW ANTERIOR RESECTION/  SPLENIC FLEXURE MOBILIZATION/  DR NATI SNACHEZ / IP CODE ONLY. Anesthesia Post Evaluation Multimodal analgesia: multimodal analgesia used between 6 hours prior to anesthesia start to PACU discharge Patient location during evaluation: PACU Patient participation: complete - patient participated Level of consciousness: awake and alert Pain management: adequate Airway patency: patent Anesthetic complications: no 
Cardiovascular status: acceptable and hemodynamically stable Respiratory status: acceptable Hydration status: acceptable Visit Vitals /65 (BP 1 Location: Left arm, BP Patient Position: At rest) Pulse (!) 102 Temp 36.2 °C (97.2 °F) Resp 12 Ht 6' (1.829 m) Wt 98.2 kg (216 lb 7 oz) SpO2 94% BMI 29.35 kg/m²

## 2019-03-19 NOTE — PROGRESS NOTES
Pt stated he had a BM. Bright red blood noted coming from patient rectum. No clots seen. Pt assisted to the bathroom to have another BM that was bright red blood. Spoke with Dr. Lakeshia Gupta (on call doc). Ordered a stat H&H and then q 8 after that. Will continue to monitor patient and report to oncoming nurse to notify Dr. Lakeshia Gupta of hgb value.

## 2019-03-19 NOTE — ANESTHESIA PREPROCEDURE EVALUATION
Anesthetic History   No history of anesthetic complications            Review of Systems / Medical History  Patient summary reviewed, nursing notes reviewed and pertinent labs reviewed    Pulmonary  Within defined limits                 Neuro/Psych   Within defined limits           Cardiovascular              Hyperlipidemia    Exercise tolerance: >4 METS     GI/Hepatic/Renal                Endo/Other  Within defined limits           Other Findings            Physical Exam    Airway  Mallampati: III    Neck ROM: normal range of motion   Mouth opening: Normal     Cardiovascular  Regular rate and rhythm,  S1 and S2 normal,  no murmur, click, rub, or gallop             Dental  No notable dental hx       Pulmonary  Breath sounds clear to auscultation               Abdominal         Other Findings            Anesthetic Plan    ASA: 2  Anesthesia type: general    Monitoring Plan: Arterial line      Induction: Intravenous  Anesthetic plan and risks discussed with: Patient and Spouse      Plan lidocaine gtt, ketamine for multimodal pain control

## 2019-03-19 NOTE — OP NOTES
Operative Report    Name: Kaila Bashir    MRN: [id number]        : 1955       Age: 70 y.o. Sex: male     Date of Surgery: 3/19/2019      Preoperative Diagnosis: Diverticulitis of large intestine with perforation, unspecified bleeding status [K57.20]     Postoperative Diagnosis: Diverticulitis of large intestine with perforation, unspecified bleeding status [K57.20]     Name of procedure:    ROBOTIC ASSISTED SIGMOID COLECTOMY WITH PRIMARY ANASTOMOSIS  SPLENIC FLEXURE MOBILIZATION    Surgeon:  Surgeon(s) and Role:     * Constantin Davis MD - Primary    Anesthesia: General     Complications: None    Estimated Blood Loss: less than 25 ml           Specimens:   ID Type Source Tests Collected by Time Destination   1 : sigmoid colon Fresh Colon  Constantin Davis MD 3/19/2019 1159 Pathology   2 : donuts Preservative Colon  Constantin Davis MD 3/19/2019 1232 Pathology       Statement of Medical Necessity: Kaila Bashir  is a 70 y.o. male who presented to twice to the ER with perforated diverticulitis. He was treated initially with oral antibiotics and when the episode recurred he was placed on IV antibiotics for 2 weeks. He did well and recovered. He had a colonoscopy last year that was normal. A robotic assisted laparoscopic sigmoid colectomy was recommended. We discussed risks, benefits and alternatives of surgery including but not limited to pain, bleeding, infection, scar, bowel injury, ureter injury, nerve injury, anastomotic leak, impotence, injury to surrounding organs, need for more procedures. Patient understood and agreed to proceed. Procedure Details   After informed consent was taken, patient was taken to the operating room and placed in supine position. Anesthesia was induced and patient was intubated. Patient received preoperative antibiotics. Patient was placed in lithotomy position with pressure points adequately padded.  Patient received a mechanical and antibiotic prep the day prior to the procedure. Hartman was placed. Abdomen and perineum were prepped and draped in standard sterile fashion. A timeout was performed with all team members present. A 1 cm incision was made to the right of the umbilicus. The fascias was elevated. A Veress needle was inserted. Saline drop test was normal. The abdomen was insufflated with carbon dioxide to a pressure of 15 mmHg and the patient tolerated insufflation well. The abdomen was bluntly accessed with a blunt 8 mm robotic trocar. A laparoscope was inserted and a general survey was performed. There was no evidence of intraabdominal injury from trocar placement. Two additional 8 mm robotic cannulas were inserted in the left upper quadrant of the abdomen. A 12 mm robotic cannula was placed in the right lower quadrant. An assistant 5 mm port was placed in the right upper quadrant. The patient was placed in a Trendelenburg position. The robot was docked. The instruments were carefully inserted under direct visualization. A general survey was performed and there were some adhesion of the sigmoid colon to the anterior abdominal wall that were carefully taken down. Using a combination of blunt and sharp dissection, a window in the sigmoidrectal mesentery was performed and the upper rectum was divided using a robotic Endo RUDY 45 mm blue loaded stapler. It did not appear to be involved in the prior bouts of diverticulitis. Atraumatic graspers were used to manipulate the bowel during the surgery. The sigmoid mesentery was scored down on the right side and a medial to lateral dissection was performed. The left ureter was identified and protected. The inferior mesenteric vein and artery were isolated and divided with the Vessel sealer. The vessel sealer was then used to the dissect colon along the white line of Toldt from the pelvic brim to the splenic flexure.  The splenic flexure was then taken down in a step-wise fashion to give adequate length for primary anastomosis. The dissection was then continued down into the pelvis to gain more mobilization. The proximal resection site was identified in the left colon assuring no involvement by diverticulitis. A mesentery window was created just beneath the colon at an area of soft pliable colon. The robotic Endo-RUDY blue loaded stapler was used to divide the proximal colon. The stapled descending colon was then placed within the pelvis to ensure adequate length for anastomosis. It lay with no tension. ICG was used to assure good blood supply at the proximal colon and at the rectal stump. At this point, the robot was undocked. A Pfannenstiel incision was performed. The fascia was opened vertically and the abdomen was accessed. An Tommie retractor was placed. The specimen was removed through this incision and sent to pathology. The stapled end of the descending colon was then brought up to skin level through the incision. A pursestring suture device was placed proximal to the staple line and the staple line amputated sharply with scissors. The colon was dilated with a 25 and then   a 28 mm metal dilator. The anvil for the 29 mm end-to-end anastomosis stapler was placed within this colon segment and secured with the pursestring suture. The pelvis was irrigated with warm saline and hemostasis was assured in all dissection planes. The dilators were passed per rectum to the staple line of the rectum. The handle for the EEA stapler was then passed under direct vision, carefully to the staple line. The spike was advanced at the staple line and connected to the anvil. It was closed to the appropriate tightness and fired, creating a circular anastomosis. The colon was inspected. It was laying without tension and with no twists. 3-0 Vicryl sutures were placed anteriorly and laterally to reduce tension.  The pelvis was irrigated with warm saline and insufflation was provided through the rigid sigmoidoscope to check for any anastomotic leak. There were no bubbles seen and again hemostasis was complete. All fluid was suctioned out. The pelvic fascia was closed with a running 0-Vicryl suture. The abdomen was insufflated and the anastomosis was again revised. No twisting was observed. The omentum was placed over the anastomosis. The ports were removed under direct vision. The pfannenstiel incision deep tissue was closed with a running 3-0 Vicryl suture. The skin incisions were closed with 4-0 Monocryl in a running fashion. Incisions were cleaned and dried. Mastisol and Steri-strips were placed. The pfannenstiel incision was also covered with an Aquacel Ag dressing. The patient was awakened in the room and extubated and taken to recovery in stable condition. All instrument and lap counts were correct x2 at the completion of procedure. The patient tolerated the procedure well with no immediate complications.       Signed by: Ellie Wilder MD  [facility]  @TD@ @HWX@

## 2019-03-19 NOTE — PROGRESS NOTES
03/19/19 1445   Dual Skin Pressure Injury Assessment   Dual Skin Pressure Injury Assessment WDL   Second Care Provider (Based on [address] [place] [address]) Germán Ruiz RN     Lap sites x5 covered with steri strips. All other skin c/d/i. No sign of pressure injury noted upon admission.

## 2019-03-20 LAB
ANION GAP SERPL CALC-SCNC: 8 MMOL/L (ref 7–16)
BASOPHILS # BLD: 0.1 K/UL (ref 0–0.2)
BASOPHILS NFR BLD: 0 % (ref 0–2)
BUN SERPL-MCNC: 10 MG/DL (ref 8–23)
CALCIUM SERPL-MCNC: 8.5 MG/DL (ref 8.3–10.4)
CHLORIDE SERPL-SCNC: 108 MMOL/L (ref 98–107)
CO2 SERPL-SCNC: 27 MMOL/L (ref 21–32)
CREAT SERPL-MCNC: 0.75 MG/DL (ref 0.8–1.5)
DIFFERENTIAL METHOD BLD: ABNORMAL
EOSINOPHIL # BLD: 0 K/UL (ref 0–0.8)
EOSINOPHIL NFR BLD: 0 % (ref 0.5–7.8)
ERYTHROCYTE [DISTWIDTH] IN BLOOD BY AUTOMATED COUNT: 13.4 % (ref 11.9–14.6)
GLUCOSE SERPL-MCNC: 121 MG/DL (ref 65–100)
HCT VFR BLD AUTO: 37 % (ref 41.1–50.3)
HCT VFR BLD AUTO: 37.5 % (ref 41.1–50.3)
HGB BLD-MCNC: 12.2 G/DL (ref 13.6–17.2)
HGB BLD-MCNC: 12.4 G/DL (ref 13.6–17.2)
IMM GRANULOCYTES # BLD AUTO: 0.1 K/UL (ref 0–0.5)
IMM GRANULOCYTES NFR BLD AUTO: 0 % (ref 0–5)
LYMPHOCYTES # BLD: 2.3 K/UL (ref 0.5–4.6)
LYMPHOCYTES NFR BLD: 16 % (ref 13–44)
MAGNESIUM SERPL-MCNC: 1.8 MG/DL (ref 1.8–2.4)
MCH RBC QN AUTO: 27.2 PG (ref 26.1–32.9)
MCHC RBC AUTO-ENTMCNC: 33 G/DL (ref 31.4–35)
MCV RBC AUTO: 82.4 FL (ref 79.6–97.8)
MONOCYTES # BLD: 1.5 K/UL (ref 0.1–1.3)
MONOCYTES NFR BLD: 10 % (ref 4–12)
NEUTS SEG # BLD: 10.7 K/UL (ref 1.7–8.2)
NEUTS SEG NFR BLD: 74 % (ref 43–78)
NRBC # BLD: 0 K/UL (ref 0–0.2)
PHOSPHATE SERPL-MCNC: 2.8 MG/DL (ref 2.3–3.7)
PLATELET # BLD AUTO: 261 K/UL (ref 150–450)
PMV BLD AUTO: 11.3 FL (ref 9.4–12.3)
POTASSIUM SERPL-SCNC: 3.9 MMOL/L (ref 3.5–5.1)
RBC # BLD AUTO: 4.49 M/UL (ref 4.23–5.6)
SODIUM SERPL-SCNC: 143 MMOL/L (ref 136–145)
WBC # BLD AUTO: 14.5 K/UL (ref 4.3–11.1)

## 2019-03-20 PROCEDURE — 44204 LAPARO PARTIAL COLECTOMY: CPT | Performed by: SURGERY

## 2019-03-20 PROCEDURE — 85025 COMPLETE CBC W/AUTO DIFF WBC: CPT

## 2019-03-20 PROCEDURE — S2900 ROBOTIC SURGICAL SYSTEM: HCPCS | Performed by: SURGERY

## 2019-03-20 PROCEDURE — 77030020255 HC SOL INJ LR 1000ML BG

## 2019-03-20 PROCEDURE — 84100 ASSAY OF PHOSPHORUS: CPT

## 2019-03-20 PROCEDURE — 74011250637 HC RX REV CODE- 250/637: Performed by: SURGERY

## 2019-03-20 PROCEDURE — 74011250636 HC RX REV CODE- 250/636: Performed by: SURGERY

## 2019-03-20 PROCEDURE — 74011250637 HC RX REV CODE- 250/637: Performed by: NURSE PRACTITIONER

## 2019-03-20 PROCEDURE — 65270000029 HC RM PRIVATE

## 2019-03-20 PROCEDURE — 44213 LAP MOBIL SPLENIC FL ADD-ON: CPT | Performed by: SURGERY

## 2019-03-20 PROCEDURE — 85018 HEMOGLOBIN: CPT

## 2019-03-20 PROCEDURE — 83735 ASSAY OF MAGNESIUM: CPT

## 2019-03-20 PROCEDURE — 80048 BASIC METABOLIC PNL TOTAL CA: CPT

## 2019-03-20 RX ORDER — PANTOPRAZOLE SODIUM 40 MG/1
40 TABLET, DELAYED RELEASE ORAL
Status: DISCONTINUED | OUTPATIENT
Start: 2019-03-20 | End: 2019-03-23 | Stop reason: HOSPADM

## 2019-03-20 RX ORDER — ENOXAPARIN SODIUM 100 MG/ML
40 INJECTION SUBCUTANEOUS EVERY 24 HOURS
Status: DISCONTINUED | OUTPATIENT
Start: 2019-03-20 | End: 2019-03-21

## 2019-03-20 RX ORDER — SODIUM CHLORIDE, SODIUM LACTATE, POTASSIUM CHLORIDE, CALCIUM CHLORIDE 600; 310; 30; 20 MG/100ML; MG/100ML; MG/100ML; MG/100ML
50 INJECTION, SOLUTION INTRAVENOUS CONTINUOUS
Status: DISCONTINUED | OUTPATIENT
Start: 2019-03-20 | End: 2019-03-21

## 2019-03-20 RX ADMIN — ACETAMINOPHEN 1000 MG: 500 TABLET, FILM COATED ORAL at 17:53

## 2019-03-20 RX ADMIN — ACETAMINOPHEN 1000 MG: 500 TABLET, FILM COATED ORAL at 23:49

## 2019-03-20 RX ADMIN — ACETAMINOPHEN 1000 MG: 500 TABLET, FILM COATED ORAL at 05:52

## 2019-03-20 RX ADMIN — OXYCODONE HYDROCHLORIDE 5 MG: 5 TABLET ORAL at 03:45

## 2019-03-20 RX ADMIN — ALVIMOPAN 12 MG: 12 CAPSULE ORAL at 18:30

## 2019-03-20 RX ADMIN — ACETAMINOPHEN 1000 MG: 500 TABLET, FILM COATED ORAL at 12:44

## 2019-03-20 RX ADMIN — OXYCODONE HYDROCHLORIDE 5 MG: 5 TABLET ORAL at 21:35

## 2019-03-20 RX ADMIN — PANTOPRAZOLE SODIUM 40 MG: 40 TABLET, DELAYED RELEASE ORAL at 12:44

## 2019-03-20 RX ADMIN — ALVIMOPAN 12 MG: 12 CAPSULE ORAL at 09:51

## 2019-03-20 RX ADMIN — ONDANSETRON 4 MG: 2 INJECTION INTRAMUSCULAR; INTRAVENOUS at 23:51

## 2019-03-20 RX ADMIN — SODIUM CHLORIDE, SODIUM LACTATE, POTASSIUM CHLORIDE, AND CALCIUM CHLORIDE 50 ML/HR: 600; 310; 30; 20 INJECTION, SOLUTION INTRAVENOUS at 17:54

## 2019-03-20 RX ADMIN — ENOXAPARIN SODIUM 40 MG: 40 INJECTION SUBCUTANEOUS at 17:53

## 2019-03-20 NOTE — PROGRESS NOTES
PLAN:  Await return of bowel function  Await pathology results  Diet Clear liquids  IVFs  SCD/IS/Protonix for prophylactic measures  Hold Lovenox for bleeding risks  OOB and ambulate  DC solano  Pain control  Monitor labs  Entereg while inpt max 7 days  Scheduled Tylenol   OOB for all meals  Ambulate 50ft TID    ASSESSMENT:  Admit Date: 3/19/2019   1 Day Post-Op  Procedure(s):  ROBOTIC LOW ANTERIOR RESECTION/  SPLENIC FLEXURE MOBILIZATION/  DR NATI SANCHEZ / IP CODE ONLY    Principal Problem:    Diverticular disease of intestine with perforation and abscess (3/19/2019)    Active Problems:    Diverticulitis (3/19/2019)         HPI:  Ashia Louie is a 70 y.o. male who presents for evaluation of perforated diverticulitis. He is here for follow up after his recent hospital stay. He continues on IV cipro/flagyl. He is taking colace and metamucil daily. His LLQ abdominal pain has improved. Pain is worse with pressure on his LLQ. Pain is better is he has daily BMs. No nausea or vomiting. No diarrhea or constipation. SUBJECTIVE:  Up ambulating in room. Feels well. No complaints. Reports adequate pain control. Denies n/v. He had 2 dark red BMs since last night. Denies flatus. Lytes stable. Hgb 12.4. AF, VSS    Intake/Output Summary (Last 24 hours) at 3/20/2019 0852  Last data filed at 3/20/2019 0300  Gross per 24 hour   Intake 3671 ml   Output 920 ml   Net 2751 ml     OBJECTIVE:  Constitutional: Alert oriented cooperative patient in no acute distress; appears stated age   Visit Vitals  /80 (BP 1 Location: Left arm, BP Patient Position: At rest)   Pulse 87   Temp 97.9 °F (36.6 °C)   Resp 17   Ht 6' (1.829 m)   Wt 216 lb 7 oz (98.2 kg)   SpO2 95%   BMI 29.35 kg/m²     Eyes:Sclera are clear. ENMT: no external lesions gross hearing normal; no obvious neck masses, no ear or lip lesions  CV: RRR. Normal perfusion  Resp: No JVD. Breathing is  non-labored; no audible wheezing.     GI: soft and non-distended Musculoskeletal: unremarkable with normal function. No embolic signs or cyanosis.    Neuro:  Oriented; moves all 4; no focal deficits  Psychiatric: normal affect and mood, no memory impairment      Patient Vitals for the past 24 hrs:   BP Temp Pulse Resp SpO2   03/20/19 0554 125/80 97.9 °F (36.6 °C) 87 17 95 %   03/20/19 0300 116/71 98.4 °F (36.9 °C) 88 17 96 %   03/19/19 2300 117/81 98.7 °F (37.1 °C) (!) 107 17 95 %   03/19/19 1927 114/74 97.4 °F (36.3 °C) 100 17 94 %   03/19/19 1835 124/76 97.4 °F (36.3 °C) 87 18 95 %   03/19/19 1451 124/78 97.7 °F (36.5 °C) (!) 102 17 96 %   03/19/19 1433 -- -- (!) 108 16 95 %   03/19/19 1430 114/67 -- (!) 102 15 94 %   03/19/19 1425 122/64 -- (!) 106 14 94 %   03/19/19 1420 124/56 -- (!) 104 12 94 %   03/19/19 1414 117/57 -- (!) 103 20 94 %   03/19/19 1410 111/61 -- (!) 102 12 94 %   03/19/19 1406 116/61 -- (!) 101 11 93 %   03/19/19 1400 112/65 -- (!) 102 10 93 %   03/19/19 1355 116/65 97.2 °F (36.2 °C) (!) 102 12 94 %   03/19/19 1350 118/65 -- (!) 103 15 96 %   03/19/19 1345 114/73 -- (!) 106 14 96 %   03/19/19 1340 106/66 -- (!) 104 8 95 %   03/19/19 1335 120/67 -- (!) 111 16 95 %   03/19/19 1330 129/65 -- (!) 112 18 94 %   03/19/19 1325 121/64 99.2 °F (37.3 °C) (!) 109 17 94 %     Labs:    Recent Labs     03/20/19  0344 03/19/19 2023   HGB 12.4*  --      --    K 3.9  --    *  --    CO2 27  --    BUN 10  --    CREA 0.75*  --    *  --    PTP  --  13.8   INR  --  1.1   APTT  --  31.4       Signed:  Jordyn Hallman NP

## 2019-03-20 NOTE — PROGRESS NOTES
Nutrition  Reason for assessment: Received referral from Malnutrition Screening Tool. Recently Lost weight without trying [Unsure]. Amount of weight loss [14-23#]  Assessment:   Diet order(s): 3-19: NPO, 3-20: Clear liquid  Food/Nutrition History:  Hx of NAH, diverticular perforation. S/P colectomy 3-19-19. Pt seen in company of wife. Pt reports diagnosis of perforation at the end of January but started having symptoms at the end of December. He did not have much of a change in his appetite, but more so was intentionally limiting his po intake to liquids and soft foods in an attempt to get the perforation ot heal and hopefully avoid surgery. He started drinking the [employer] equivalent of Ensure plus (15 grams protein/bottle) or Nestle protien drink once a day since he was restricting his diet so much. He reports -240# and notes that he needed to lose weight and would like to stay around 215-220#. He has had no problems tolerating the clear liquid diet and is tired of broth and looking forward to diet advancement. Anthropometrics: Height: 6' (182.9 cm), Weight: 99.8 kg (220 lb 1.6 oz), Weight Source: Standing scale (comment), Body mass index is 29.85 kg/m². BMI class of normal weight. Weight hx per EMR ( Based on [employer] [facility] functionality, RD cannot know if these weight are actual versus stated):   WT / BMI WEIGHT   3/12/2019 220 lb 9 oz   3/5/2019 226 lb 4.8 oz   2/21/2019 229 lb   2/10/2019 230 lb   1/28/2019 232 lb 9.6 oz   1/24/2019 238 lb   1/24/2019 240 lb   1/17/2019 238 lb   12/19/2018 243 lb   12/12/2018 241 lb    8.4% change in wt within ~ 3 months c/w severe change.    Macronutrient needs:   EER:  8407-0744 kcal /day (20-25 kcal/kg ABW)   EPR:   grams protein/day (1.2-1.5 grams/kg IBW)  Intake/Comparative Standards: Current clear liquid diet does not meet kcal or protein needs   Acute Illness, Chronic Illness, or Social/Enviornmental: Chronic illness  Energy Intake: Less than/equal to 75% of est energy req for greater than/equal to 1 month  Weight Loss: Greater than 7.5% x 3 mos  Chronic Illness - Malnutrition Diagnosis: Severe malnutrition     Nutrition Diagnosis: Severe chronic malnutrition r/t inability to consume sufficient oral intake as evidenced by self restricted intake with associated wt loss as above,, current diet limited to nutritionally inadequate clear liquid diet post-op surgery as above    Intervention:  Meals and snacks:  Await progression of p.o. diet as GI status allows. Nutrition supplement therapy: Ensure clear tid with clear liquids, then Ensure Enlive tid once diet is progressed to at least full liquids. PN: If it is expected that patient will be unable to tolerate p.o. diet greater than 75% of full liquid diet or greater within 3-5 days, consider TPN. If TPN is pursued, please order nutrition consult for TPN management. Discharge nutrition plan: Continue nutritional supplement twice a day for 30 days.     Keyonna Deleon, [address] [place] [address], [address]

## 2019-03-20 NOTE — PROGRESS NOTES
Initial visit to assess pt's spiritual needs. Pt   Was out of his room, no family members present. Left a card.       Chaplain Rachel Soto MDiv,ThM,PhD

## 2019-03-20 NOTE — PROGRESS NOTES
END OF SHIFT NOTE:    INTAKE/OUTPUT  03/19 0701 - 03/20 0700  In: 0154 [I.V.:3671]  Out: 920 [Urine:770]  Voiding: YES  Catheter: NO  Drain:   Orogastric Tube 03/19/19 (Active)               Flatus: Patient does have flatus present. Stool:  0 occurrences. Characteristics:  Stool Assessment  Stool Appearance: Bloody(Per patient )  Stool Source/Status: Rectum    Emesis: 0 occurrences. Characteristics:        VITAL SIGNS  Patient Vitals for the past 12 hrs:   Temp Pulse Resp BP SpO2   03/20/19 1509 98.4 °F (36.9 °C) 93 18 121/77 95 %   03/20/19 1135 97.8 °F (36.6 °C) 89 18 130/78 94 %   03/20/19 0907 98 °F (36.7 °C) 87 18 137/84 93 %       Pain Assessment  Pain Intensity 1: 0 (03/20/19 1244)  Pain Location 1: Shoulder  Pain Intervention(s) 1: Repositioned  Patient Stated Pain Goal: 0    Ambulating  No    Shift report given to oncoming nurse at the bedside.     Aston Guardado RN

## 2019-03-20 NOTE — PROGRESS NOTES
Spoke to Mr. Cayla Christensen in room 217 about Case Management and discharge planning. He is POD#1 s/p sigmoid colectomy. He was independent with ADLs living with his wife in a two-story house. No additional discharge needs anticipated. Care Management Interventions  PCP Verified by CM: Yes  Physical Therapy Consult: No  Occupational Therapy Consult: No  Current Support Network: Lives with Spouse, Own Home  Confirm Follow Up Transport: Family  Plan discussed with Pt/Family/Caregiver:  Yes

## 2019-03-21 LAB
ANION GAP SERPL CALC-SCNC: 6 MMOL/L (ref 7–16)
BASOPHILS # BLD: 0.1 K/UL (ref 0–0.2)
BASOPHILS NFR BLD: 1 % (ref 0–2)
BUN SERPL-MCNC: 5 MG/DL (ref 8–23)
CALCIUM SERPL-MCNC: 8.6 MG/DL (ref 8.3–10.4)
CHLORIDE SERPL-SCNC: 108 MMOL/L (ref 98–107)
CO2 SERPL-SCNC: 29 MMOL/L (ref 21–32)
CREAT SERPL-MCNC: 0.71 MG/DL (ref 0.8–1.5)
DIFFERENTIAL METHOD BLD: ABNORMAL
EOSINOPHIL # BLD: 0.1 K/UL (ref 0–0.8)
EOSINOPHIL NFR BLD: 1 % (ref 0.5–7.8)
ERYTHROCYTE [DISTWIDTH] IN BLOOD BY AUTOMATED COUNT: 13.2 % (ref 11.9–14.6)
GLUCOSE SERPL-MCNC: 113 MG/DL (ref 65–100)
HCT VFR BLD AUTO: 33.9 % (ref 41.1–50.3)
HGB BLD-MCNC: 11 G/DL (ref 13.6–17.2)
IMM GRANULOCYTES # BLD AUTO: 0 K/UL (ref 0–0.5)
IMM GRANULOCYTES NFR BLD AUTO: 0 % (ref 0–5)
LYMPHOCYTES # BLD: 1.6 K/UL (ref 0.5–4.6)
LYMPHOCYTES NFR BLD: 15 % (ref 13–44)
MCH RBC QN AUTO: 27.2 PG (ref 26.1–32.9)
MCHC RBC AUTO-ENTMCNC: 32.4 G/DL (ref 31.4–35)
MCV RBC AUTO: 83.9 FL (ref 79.6–97.8)
MONOCYTES # BLD: 1 K/UL (ref 0.1–1.3)
MONOCYTES NFR BLD: 9 % (ref 4–12)
NEUTS SEG # BLD: 8.4 K/UL (ref 1.7–8.2)
NEUTS SEG NFR BLD: 75 % (ref 43–78)
NRBC # BLD: 0 K/UL (ref 0–0.2)
PLATELET # BLD AUTO: 203 K/UL (ref 150–450)
PMV BLD AUTO: 10.9 FL (ref 9.4–12.3)
POTASSIUM SERPL-SCNC: 3.6 MMOL/L (ref 3.5–5.1)
RBC # BLD AUTO: 4.04 M/UL (ref 4.23–5.6)
SODIUM SERPL-SCNC: 143 MMOL/L (ref 136–145)
WBC # BLD AUTO: 11.2 K/UL (ref 4.3–11.1)

## 2019-03-21 PROCEDURE — 74011250637 HC RX REV CODE- 250/637: Performed by: SURGERY

## 2019-03-21 PROCEDURE — 74011250637 HC RX REV CODE- 250/637: Performed by: NURSE PRACTITIONER

## 2019-03-21 PROCEDURE — 65270000029 HC RM PRIVATE

## 2019-03-21 PROCEDURE — 85025 COMPLETE CBC W/AUTO DIFF WBC: CPT

## 2019-03-21 PROCEDURE — 80048 BASIC METABOLIC PNL TOTAL CA: CPT

## 2019-03-21 RX ORDER — ACETAMINOPHEN 500 MG
1000 TABLET ORAL
Status: DISCONTINUED | OUTPATIENT
Start: 2019-03-21 | End: 2019-03-23 | Stop reason: HOSPADM

## 2019-03-21 RX ADMIN — ACETAMINOPHEN 1000 MG: 500 TABLET, FILM COATED ORAL at 13:18

## 2019-03-21 RX ADMIN — ACETAMINOPHEN 1000 MG: 500 TABLET, FILM COATED ORAL at 06:04

## 2019-03-21 RX ADMIN — ACETAMINOPHEN 1000 MG: 500 TABLET, FILM COATED ORAL at 19:26

## 2019-03-21 RX ADMIN — ALVIMOPAN 12 MG: 12 CAPSULE ORAL at 17:37

## 2019-03-21 RX ADMIN — ALVIMOPAN 12 MG: 12 CAPSULE ORAL at 08:41

## 2019-03-21 RX ADMIN — PANTOPRAZOLE SODIUM 40 MG: 40 TABLET, DELAYED RELEASE ORAL at 06:05

## 2019-03-21 NOTE — PROGRESS NOTES
03/20/19 2135   Pain 1   Pain Scale 1 Numeric (0 - 10)   Pain Intensity 1 5   Pain Location 1 Generalized   Pain Intervention(s) 1 Medication (see MAR)

## 2019-03-21 NOTE — PROGRESS NOTES
03/21/19 1927   Pain 1   Pain Scale 1 Numeric (0 - 10)   Pain Intensity 1 3   Pain Location 1 Generalized   Pain Intervention(s) 1 Medication (see MAR)

## 2019-03-21 NOTE — PROGRESS NOTES
END OF SHIFT NOTE:    INTAKE/OUTPUT  03/20 0701 - 03/21 0700  In: 9313 [P.O.:360; I.V.:1077]  Out: 1450 [Urine:1450]  Voiding: YES  Catheter: NO  Drain:   Orogastric Tube 03/19/19 (Active)               Flatus: Patient does have flatus present. Stool:  0 occurrences. Characteristics:  Stool Assessment  Stool Appearance: Bloody(small per pt.)  Stool Source/Status: Rectum    Emesis: 0 occurrences. Characteristics:        VITAL SIGNS  Patient Vitals for the past 12 hrs:   Temp Pulse Resp BP SpO2   03/21/19 0340 98.2 °F (36.8 °C) 92 18 124/73 94 %   03/20/19 2315 99.6 °F (37.6 °C) 100 18 126/76 95 %   03/20/19 1908 99.7 °F (37.6 °C) (!) 108 18 132/74 93 %       Pain Assessment  Pain Intensity 1: 0 (03/21/19 0200)  Pain Location 1: Generalized  Pain Intervention(s) 1: Medication (see MAR)  Patient Stated Pain Goal: 0    Ambulating  No    Shift report given to oncoming nurse at the bedside.     Yesica Moreno

## 2019-03-21 NOTE — CDMP QUERY
Pt admitted with diverticulitis. Pt noted to have drop in hemoglobin. If possible, please document in the progress notes and d/c summary if you are evaluating and / or treating any of the following:  Anemia due to acute blood loss  Anemia due to postoperative blood loss (please specify if expected or a complication of the surgery)  Dilutional anemia  Other, please specify  Clinically unable to determine The medical record reflects the following: 
   Risk Factors: sigmoid colectomy Clinical Indicators: HGB on admission 14.2 with drop to 11.0 on 3/21/19. Per p/n \"He had 2 bloody BMs overnight\" Treatment:  Lovenox held. H&H Q8H. Thank You. Faby Vasquez RN CDS Compliant Documentation Management Program

## 2019-03-21 NOTE — PROGRESS NOTES
PLAN:  Path resulted: see below  Advance to full liquids  Stop IVF  SCD/IS/Protonix for prophylactic measures  Hold Lovenox for bleeding risks  OOB and ambulate  Hartman out 3/20  Pain control  Monitor labs  Entereg while inpt max 7 days  Scheduled Tylenol   OOB for all meals  Ambulate 50ft TID  Home in 1-2 days    ASSESSMENT:  Admit Date: 3/19/2019   2 Day Post-Op  Procedure(s):  ROBOTIC LOW ANTERIOR RESECTION/  SPLENIC FLEXURE MOBILIZATION/  DR NATI SANCHEZ / IP CODE ONLY    Principal Problem:    Diverticular disease of intestine with perforation and abscess (3/19/2019)    Active Problems:    Diverticulitis (3/19/2019)         HPI:  Lydia Alcala is a 70 y.o. male who presents for evaluation of perforated diverticulitis. He is here for follow up after his recent hospital stay. He continues on IV cipro/flagyl. He is taking colace and metamucil daily. His LLQ abdominal pain has improved. Pain is worse with pressure on his LLQ. Pain is better is he has daily BMs. No nausea or vomiting. No diarrhea or constipation. SUBJECTIVE:  Feels well. No complaints. Reports adequate pain control. Denies n/v. BMs beginning to have more of a stool-like appearance. Passing flatus. Lytes stable. Hgb 12.4-->now 11. AF, VSS. Voiding without difficulty. Path results 3/19     DIAGNOSIS   A: \"SIGMOID COLON\":   DIVERTICULOSIS WITH ACUTE AND CHRONIC DIVERTICULITIS   B: \"DONUTS\":   LARGE INTESTINE NEGATIVE FOR MALIGNANT TUMOR AND SIGNIFICANT INFLAMMATION       Intake/Output Summary (Last 24 hours) at 3/21/2019 0912  Last data filed at 3/21/2019 1661  Gross per 24 hour   Intake 1437 ml   Output 1950 ml   Net -513 ml     OBJECTIVE:  Constitutional: Alert oriented cooperative patient in no acute distress; appears stated age   Visit Vitals  /82   Pulse 85   Temp 97.9 °F (36.6 °C)   Resp 18   Ht 6' (1.829 m)   Wt 220 lb 1.6 oz (99.8 kg)   SpO2 93%   BMI 29.85 kg/m²     Eyes:Sclera are clear.    ENMT: no external lesions gross hearing normal; no obvious neck masses, no ear or lip lesions  CV: RRR. Normal perfusion  Resp: No JVD. Breathing is  non-labored; no audible wheezing. GI: soft and non-distended     Musculoskeletal: unremarkable with normal function. No embolic signs or cyanosis. Neuro:  Oriented; moves all 4; no focal deficits  Psychiatric: normal affect and mood, no memory impairment      Patient Vitals for the past 24 hrs:   BP Temp Pulse Resp SpO2 Weight   03/21/19 0908 136/82 97.9 °F (36.6 °C) 85 18 93 % --   03/21/19 0340 124/73 98.2 °F (36.8 °C) 92 18 94 % --   03/20/19 2315 126/76 99.6 °F (37.6 °C) 100 18 95 % --   03/20/19 1908 132/74 99.7 °F (37.6 °C) (!) 108 18 93 % --   03/20/19 1509 121/77 98.4 °F (36.9 °C) 93 18 95 % --   03/20/19 1135 130/78 97.8 °F (36.6 °C) 89 18 94 % --   03/20/19 1005 -- -- -- -- -- 220 lb 1.6 oz (99.8 kg)     Labs:    Recent Labs     03/21/19  0555  03/19/19 2023   WBC 11.2*   < >  --    HGB 11.0*   < >  --       < >  --       < >  --    K 3.6   < >  --    *   < >  --    CO2 29   < >  --    BUN 5*   < >  --    CREA 0.71*   < >  --    *   < >  --    PTP  --   --  13.8   INR  --   --  1.1   APTT  --   --  31.4    < > = values in this interval not displayed.        Signed:  Bernarda Skaggs NP

## 2019-03-22 LAB
BASOPHILS # BLD: 0.1 K/UL (ref 0–0.2)
BASOPHILS NFR BLD: 1 % (ref 0–2)
DIFFERENTIAL METHOD BLD: ABNORMAL
EOSINOPHIL # BLD: 0.5 K/UL (ref 0–0.8)
EOSINOPHIL NFR BLD: 6 % (ref 0.5–7.8)
ERYTHROCYTE [DISTWIDTH] IN BLOOD BY AUTOMATED COUNT: 13.2 % (ref 11.9–14.6)
HCT VFR BLD AUTO: 30.6 % (ref 41.1–50.3)
HGB BLD-MCNC: 10 G/DL (ref 13.6–17.2)
IMM GRANULOCYTES # BLD AUTO: 0 K/UL (ref 0–0.5)
IMM GRANULOCYTES NFR BLD AUTO: 0 % (ref 0–5)
LYMPHOCYTES # BLD: 1.8 K/UL (ref 0.5–4.6)
LYMPHOCYTES NFR BLD: 25 % (ref 13–44)
MCH RBC QN AUTO: 27.1 PG (ref 26.1–32.9)
MCHC RBC AUTO-ENTMCNC: 32.7 G/DL (ref 31.4–35)
MCV RBC AUTO: 82.9 FL (ref 79.6–97.8)
MONOCYTES # BLD: 0.7 K/UL (ref 0.1–1.3)
MONOCYTES NFR BLD: 10 % (ref 4–12)
NEUTS SEG # BLD: 4.3 K/UL (ref 1.7–8.2)
NEUTS SEG NFR BLD: 58 % (ref 43–78)
NRBC # BLD: 0 K/UL (ref 0–0.2)
PLATELET # BLD AUTO: 183 K/UL (ref 150–450)
PMV BLD AUTO: 11 FL (ref 9.4–12.3)
RBC # BLD AUTO: 3.69 M/UL (ref 4.23–5.6)
WBC # BLD AUTO: 7.4 K/UL (ref 4.3–11.1)

## 2019-03-22 PROCEDURE — 74011250637 HC RX REV CODE- 250/637: Performed by: NURSE PRACTITIONER

## 2019-03-22 PROCEDURE — 74011250637 HC RX REV CODE- 250/637: Performed by: SURGERY

## 2019-03-22 PROCEDURE — 85025 COMPLETE CBC W/AUTO DIFF WBC: CPT

## 2019-03-22 PROCEDURE — 65270000029 HC RM PRIVATE

## 2019-03-22 RX ADMIN — Medication 1 TABLET: at 09:42

## 2019-03-22 RX ADMIN — ALVIMOPAN 12 MG: 12 CAPSULE ORAL at 17:27

## 2019-03-22 RX ADMIN — PANTOPRAZOLE SODIUM 40 MG: 40 TABLET, DELAYED RELEASE ORAL at 06:04

## 2019-03-22 RX ADMIN — ALVIMOPAN 12 MG: 12 CAPSULE ORAL at 08:32

## 2019-03-22 NOTE — PROGRESS NOTES
Shift assessment complete via doc flow sheet. All needs met at this time. Staff will monitor with hourly rounds.      03/21/19 1928   Psychosocial   Psychosocial (WDL) WDL   Purposeful Interaction Yes   Pt Identified Daily Priority Clinical issues (comment)   [employer] Process Attend basic human needs   Caring Interventions Reassure   Reassure Caring rounds

## 2019-03-22 NOTE — PROGRESS NOTES
END OF SHIFT NOTE:    INTAKE/OUTPUT  03/20 0701 - 03/21 0700  In: 4756 [P.O.:360; I.V.:1077]  Out: 1450 [Urine:1450]  Voiding: YES  Catheter: NO  Drain:     Flatus: Patient does have flatus present. Stool:  2 occurrences. Characteristics:  Stool Assessment  Stool Appearance: Melena(per pt)  Stool Source/Status: Rectum    Emesis: 0 occurrences. Characteristics:        VITAL SIGNS  Patient Vitals for the past 12 hrs:   Temp Pulse Resp BP SpO2   03/21/19 1945 98.9 °F (37.2 °C) 84 18 113/71 95 %   03/21/19 1700 97.8 °F (36.6 °C) 81 18 121/66 96 %   03/21/19 1323 97.5 °F (36.4 °C) 89 18 130/80 94 %   03/21/19 0908 97.9 °F (36.6 °C) 85 18 136/82 93 %       Pain Assessment  Pain Intensity 1: 3 (03/21/19 1927)  Pain Location 1: Generalized  Pain Intervention(s) 1: Medication (see MAR)  Patient Stated Pain Goal: 0    Ambulating  Yes - Patient ambulated three times in the german and tolerated well. Shift report given to oncoming nurse at the bedside.     Kahlil Atkins, RN

## 2019-03-22 NOTE — PROGRESS NOTES
PLAN:  Path resulted: see below  Advance to Soft Diet  Add multivitamin  SCD/IS/Protonix for prophylactic measures  Hold Lovenox for bleeding risks  OOB and ambulate  Hartman out 3/20  Pain control  Monitor labs  Entereg while inpt max 7 days  Scheduled Tylenol   OOB for all meals  Ambulate 50ft TID  Home tomorrow    ASSESSMENT:  Admit Date: 3/19/2019   3 Day Post-Op  Procedure(s):  ROBOTIC LOW ANTERIOR RESECTION/  SPLENIC FLEXURE MOBILIZATION/  DR NATI SANCHEZ / IP CODE ONLY    Principal Problem:    Diverticular disease of intestine with perforation and abscess (3/19/2019)    Active Problems:    Diverticulitis (3/19/2019)         HPI:  Yehuda Whitney is a 70 y.o. male who presents for evaluation of perforated diverticulitis. He is here for follow up after his recent hospital stay. He continues on IV cipro/flagyl. He is taking colace and metamucil daily. His LLQ abdominal pain has improved. Pain is worse with pressure on his LLQ. Pain is better is he has daily BMs. No nausea or vomiting. No diarrhea or constipation. SUBJECTIVE:  Awake in bed. Feeling good. No complaints. Reports pain controlled. Tolerating Full liquids. Denies n/v. +Flatus/+BM. Lytes stable. Hgb 11-->now 10 - likely dilutional. AF, VSS. Voiding without difficulty. Path results 3/19     DIAGNOSIS   A: \"SIGMOID COLON\":   DIVERTICULOSIS WITH ACUTE AND CHRONIC DIVERTICULITIS   B: \"DONUTS\":   LARGE INTESTINE NEGATIVE FOR MALIGNANT TUMOR AND SIGNIFICANT INFLAMMATION       Intake/Output Summary (Last 24 hours) at 3/22/2019 1013  Last data filed at 3/22/2019 0849  Gross per 24 hour   Intake --   Output 650 ml   Net -650 ml     OBJECTIVE:  Constitutional: Alert oriented cooperative patient in no acute distress; appears stated age   Visit Vitals  /76 (BP 1 Location: Left arm)   Pulse 78   Temp 98.7 °F (37.1 °C)   Resp 16   Ht 6' (1.829 m)   Wt 223 lb 8 oz (101.4 kg)   SpO2 94%   BMI 30.31 kg/m²     Eyes:Sclera are clear.    ENMT: no external lesions gross hearing normal; no obvious neck masses, no ear or lip lesions  CV: RRR. Normal perfusion  Resp: No JVD. Breathing is  non-labored; no audible wheezing. GI: soft and non-distended     Musculoskeletal: unremarkable with normal function. No embolic signs or cyanosis. Neuro:  Oriented; moves all 4; no focal deficits  Psychiatric: normal affect and mood, no memory impairment      Patient Vitals for the past 24 hrs:   BP Temp Pulse Resp SpO2 Weight   03/22/19 0728 129/76 98.7 °F (37.1 °C) 78 16 94 % --   03/22/19 0415 127/73 98.3 °F (36.8 °C) 77 16 92 % --   03/22/19 0327 -- -- -- -- -- 223 lb 8 oz (101.4 kg)   03/21/19 2346 121/76 98 °F (36.7 °C) 79 17 94 % --   03/21/19 1945 113/71 98.9 °F (37.2 °C) 84 18 95 % --   03/21/19 1700 121/66 97.8 °F (36.6 °C) 81 18 96 % --   03/21/19 1323 130/80 97.5 °F (36.4 °C) 89 18 94 % --     Labs:    Recent Labs     03/22/19  0359 03/21/19  0555  03/19/19 2023   WBC 7.4 11.2*   < >  --    HGB 10.0* 11.0*   < >  --     203   < >  --    NA  --  143   < >  --    K  --  3.6   < >  --    CL  --  108*   < >  --    CO2  --  29   < >  --    BUN  --  5*   < >  --    CREA  --  0.71*   < >  --    GLU  --  113*   < >  --    PTP  --   --   --  13.8   INR  --   --   --  1.1   APTT  --   --   --  31.4    < > = values in this interval not displayed.        Signed:  Carlos Garcia NP

## 2019-03-23 VITALS
OXYGEN SATURATION: 94 % | WEIGHT: 216.5 LBS | RESPIRATION RATE: 17 BRPM | HEART RATE: 71 BPM | SYSTOLIC BLOOD PRESSURE: 118 MMHG | DIASTOLIC BLOOD PRESSURE: 73 MMHG | HEIGHT: 72 IN | TEMPERATURE: 98.1 F | BODY MASS INDEX: 29.32 KG/M2

## 2019-03-23 PROCEDURE — 74011250637 HC RX REV CODE- 250/637: Performed by: SURGERY

## 2019-03-23 PROCEDURE — 74011250637 HC RX REV CODE- 250/637: Performed by: NURSE PRACTITIONER

## 2019-03-23 RX ADMIN — ALVIMOPAN 12 MG: 12 CAPSULE ORAL at 08:40

## 2019-03-23 RX ADMIN — ACETAMINOPHEN 1000 MG: 500 TABLET, FILM COATED ORAL at 03:26

## 2019-03-23 RX ADMIN — PANTOPRAZOLE SODIUM 40 MG: 40 TABLET, DELAYED RELEASE ORAL at 06:08

## 2019-03-23 RX ADMIN — Medication 1 TABLET: at 08:40

## 2019-03-23 NOTE — DISCHARGE INSTRUCTIONS
DISCHARGE SUMMARY from Nurse    PATIENT INSTRUCTIONS:    After general anesthesia or intravenous sedation, for 24 hours or while taking prescription Narcotics:  · Limit your activities  · Do not drive and operate hazardous machinery  · Do not make important personal or business decisions  · Do  not drink alcoholic beverages  · If you have not urinated within 8 hours after discharge, please contact your surgeon on call. Report the following to your surgeon:  · Excessive pain, swelling, redness or odor of or around the surgical area  · Temperature over 100.5  · Nausea and vomiting lasting longer than 4 hours or if unable to take medications  · Any signs of decreased circulation or nerve impairment to extremity: change in color, persistent  numbness, tingling, coldness or increase pain  · Any questions    What to do at Home:  Recommended activity: No lifting, Driving, or Strenuous exercise until seen by doctor. No lifiting greater than 10 pounds. If you experience any of the following symptoms fever greater than 100.5, unrelieved nausea/vomiting, unable to pass gas or have a bowel movement, unrelieved pain, redness or drainage from incisions, any questions or concerns, please follow up with Dr. Declan Machado. Diet:  soft    *  Please give a list of your current medications to your Primary Care Provider. *  Please update this list whenever your medications are discontinued, doses are      changed, or new medications (including over-the-counter products) are added. *  Please carry medication information at all times in case of emergency situations. These are general instructions for a healthy lifestyle:    No smoking/ No tobacco products/ Avoid exposure to second hand smoke  Surgeon General's Warning:  Quitting smoking now greatly reduces serious risk to your health.     Obesity, smoking, and sedentary lifestyle greatly increases your risk for illness    A healthy diet, regular physical exercise & weight monitoring are important for maintaining a healthy lifestyle    You may be retaining fluid if you have a history of heart failure or if you experience any of the following symptoms:  Weight gain of 3 pounds or more overnight or 5 pounds in a week, increased swelling in our hands or feet or shortness of breath while lying flat in bed. Please call your doctor as soon as you notice any of these symptoms; do not wait until your next office visit. Recognize signs and symptoms of STROKE:    F-face looks uneven    A-arms unable to move or move unevenly    S-speech slurred or non-existent    T-time-call 911 as soon as signs and symptoms begin-DO NOT go       Back to bed or wait to see if you get better-TIME IS BRAIN. Warning Signs of HEART ATTACK     Call 911 if you have these symptoms:   Chest discomfort. Most heart attacks involve discomfort in the center of the chest that lasts more than a few minutes, or that goes away and comes back. It can feel like uncomfortable pressure, squeezing, fullness, or pain.  Discomfort in other areas of the upper body. Symptoms can include pain or discomfort in one or both arms, the back, neck, jaw, or stomach.  Shortness of breath with or without chest discomfort.  Other signs may include breaking out in a cold sweat, nausea, or lightheadedness. Don't wait more than five minutes to call 911 - MINUTES MATTER! Fast action can save your life. Calling 911 is almost always the fastest way to get lifesaving treatment. Emergency Medical Services staff can begin treatment when they arrive -- up to an hour sooner than if someone gets to the hospital by car. The discharge information has been reviewed with the patient. The patient verbalized understanding.   Discharge medications reviewed with the patient and appropriate educational materials and side effects teaching were provided.   ___________________________________________________________________________________________________________________________________

## 2019-03-23 NOTE — PROGRESS NOTES
PLAN:  Soft Diet  Home today  Follow up in office with Dr. Edil Easton as scheduled    ASSESSMENT:  Admit Date: 3/19/2019   4 Day Post-Op  Procedure(s):  ROBOTIC LOW ANTERIOR RESECTION/  SPLENIC FLEXURE MOBILIZATION/  DR NATI SANCHEZ / IP CODE ONLY    Principal Problem:    Diverticular disease of intestine with perforation and abscess (3/19/2019)    Active Problems:    Diverticulitis (3/19/2019)       HPI:  Alan Cash is a 70 y.o. male who presents for evaluation of perforated diverticulitis. He is here for follow up after his recent hospital stay. He continues on IV cipro/flagyl. He is taking colace and metamucil daily. His LLQ abdominal pain has improved. Pain is worse with pressure on his LLQ. Pain is better is he has daily BMs. No nausea or vomiting. No diarrhea or constipation. SUBJECTIVE:  Awake in bed. Feeling good. No complaints and ready to go home. Tolerating Soft diet. Denies n/v. +Flatus/+BM. Voiding without difficulty. AF, VSS. Path results 3/19     DIAGNOSIS   A: \"SIGMOID COLON\":   DIVERTICULOSIS WITH ACUTE AND CHRONIC DIVERTICULITIS   B: \"DONUTS\":   LARGE INTESTINE NEGATIVE FOR MALIGNANT TUMOR AND SIGNIFICANT INFLAMMATION       Intake/Output Summary (Last 24 hours) at 3/23/2019 1036  Last data filed at 3/22/2019 1432  Gross per 24 hour   Intake 240 ml   Output --   Net 240 ml     OBJECTIVE:  Constitutional: Alert oriented cooperative patient in no acute distress; appears stated age   Visit Vitals  /73   Pulse 71   Temp 98.1 °F (36.7 °C)   Resp 17   Ht 6' (1.829 m)   Wt 216 lb 8 oz (98.2 kg)   SpO2 94%   BMI 29.36 kg/m²     Eyes:Sclera are clear. ENMT: no external lesions gross hearing normal; no obvious neck masses, no ear or lip lesions  CV: RRR. Normal perfusion  Resp: No JVD. Breathing is  non-labored; no audible wheezing. GI: soft and non-distended, steri strips c/d/i    Musculoskeletal: unremarkable with normal function. No embolic signs or cyanosis.    Neuro:  Oriented; moves all 4; no focal deficits  Psychiatric: normal affect and mood, no memory impairment      Patient Vitals for the past 24 hrs:   BP Temp Pulse Resp SpO2 Weight   03/23/19 0729 118/73 98.1 °F (36.7 °C) 71 17 94 % --   03/23/19 0559 128/83 98.2 °F (36.8 °C) 64 17 98 % --   03/23/19 0558 -- -- -- -- -- 216 lb 8 oz (98.2 kg)   03/22/19 2302 129/75 98.6 °F (37 °C) 71 15 94 % --   03/22/19 1949 117/70 99 °F (37.2 °C) 79 16 95 % --   03/22/19 1545 104/68 98.5 °F (36.9 °C) 83 16 94 % --   03/22/19 1127 120/77 98.6 °F (37 °C) 75 16 96 % --     Labs:    Recent Labs     03/22/19  0359 03/21/19  0555   WBC 7.4 11.2*   HGB 10.0* 11.0*    203   NA  --  143   K  --  3.6   CL  --  108*   CO2  --  29   BUN  --  5*   CREA  --  0.71*   GLU  --  113*       Signed:  Yvon Arteaga NP

## 2019-03-23 NOTE — PROGRESS NOTES
Discharged to home. To discharge area via wheelchair accompanied by staff. To home with his wife. Condition stable at discharge.

## 2019-03-23 NOTE — PROGRESS NOTES
Discharge instructions reviewed with patient and his wife, including follow up appointment, activity, soft diet, when to call MD/911, incision care, colon resection and bland diet attachment. Both verbalized understanding and denied questions. Copy of discharge instructions given to patient. See hard copy for signature. Condition stable.

## 2019-03-23 NOTE — PROGRESS NOTES
Abdominal incisions well approximated with steri- strips intact, no signs of infection. PICC line removed, patient instructed to remove dressing over site in 24 hours. Patient verbalized understanding.

## 2019-03-23 NOTE — PROGRESS NOTES
END OF SHIFT NOTE:    INTAKE/OUTPUT  03/22 0701 - 03/23 0700  In: 480 [P.O.:480]  Out: 650 [Urine:650]  Voiding: YES  Catheter: NO  Drain:              Flatus: Patient does have flatus present. Stool:  0 occurrences. Characteristics:  Stool Assessment  Stool Color: Brown  Stool Appearance: Loose(per patient)  Stool Source/Status: Rectum    Emesis: 0 occurrences. Characteristics:        VITAL SIGNS  Patient Vitals for the past 12 hrs:   Temp Pulse Resp BP SpO2   03/23/19 0559 98.2 °F (36.8 °C) 64 17 128/83 98 %   03/22/19 2302 98.6 °F (37 °C) 71 15 129/75 94 %   03/22/19 1949 99 °F (37.2 °C) 79 16 117/70 95 %       Pain Assessment  Pain Intensity 1: 0 (03/23/19 0019)  Pain Location 1: Generalized  Pain Intervention(s) 1: Medication (see MAR)  Patient Stated Pain Goal: 0    Ambulating  Yes    Shift report given to oncoming nurse at the bedside.     Francisco J Farooq RN

## 2019-03-31 NOTE — DISCHARGE SUMMARY
Physician Discharge Summary     Patient ID:  Paul Montgomery  [id number]  70 y.o.  1955    Allergies: Patient has no known allergies. HPI: [age] y. o. male who presents for evaluation of perforated diverticulitis. He is here for follow up after his recent hospital stay. He continues on IV cipro/flagyl. He is taking colace and metamucil daily. His LLQ abdominal pain has improved. Pain is worse with pressure on his LLQ. Pain is better is he has daily BMs. No nausea or vomiting. No diarrhea or constipation.      Pt underwent a robotic assisted sigmoid colectomy with primary anastomosis and splenic flexure mobilization 3/19/19 by Dr. Connor Cushing. Admit Date: 3/19/2019    Discharge Date: 3/23/2019    * Admission Diagnoses: Diverticulitis of large intestine with perforation, unspecified bleeding status [K57.20]  Diverticular disease of intestine with perforation and abscess [K57.80]  Diverticulitis [K57.92]    * Discharge Diagnoses:    Hospital Problems as of 3/23/2019 Date Reviewed: 3/19/2019          Codes Class Noted - Resolved POA    * (Principal) Diverticular disease of intestine with perforation and abscess ICD-10-CM: K57.80  ICD-9-CM: 562.11  3/19/2019 - Present Unknown        Diverticulitis ICD-10-CM: K57.92  ICD-9-CM: 562.11  3/19/2019 - Present Unknown               Admission Condition: Stable    * Discharge Condition: improved    * Procedures: Procedure(s):  ROBOTIC LOW ANTERIOR RESECTION/  SPLENIC FLEXURE MOBILIZATION/  DR NATI SANCHEZ / [address] [place] [address] Course:   Normal hospital course for this procedure. Consults: None    Significant Diagnostic Studies: labs and radiology    * Disposition: Home    Discharge Medications:   Discharge Medication List as of 3/23/2019 11:23 AM      CONTINUE these medications which have NOT CHANGED    Details   fenofibric acid (TRILIPIX) 135 mg capsule Take 1 Cap by mouth daily. , Normal, Disp-90 Cap, R-3      simvastatin (ZOCOR) 20 mg tablet Take 1 Tab by mouth nightly., Normal, Disp-90 Tab, R-3      aspirin delayed-release 81 mg tablet Take 81 mg by mouth daily. , Historical Med      naproxen (NAPROSYN) 500 mg tablet Take 1 Tab by mouth two (2) times daily (with meals). , Normal, Disp-28 Tab, R-0      sildenafil citrate (VIAGRA) 50 mg tablet Take one tablet one hour before intercourse, Normal, Disp-9 Tab, R-11         STOP taking these medications       neomycin (MYCIFRADIN) 500 mg tablet Comments:   Reason for Stopping:         metroNIDAZOLE (FLAGYL) 500 mg tablet Comments:   Reason for Stopping:               * Follow-up Care/Patient Instructions: Activity: Activity as tolerated  Diet: Soft diet  Wound Care: Keep wound clean and dry    Follow-up Information     Follow up With Specialties Details Why Contact Info    Yara Pratt MD [facility]   [address], [facility] [address]  [phone]      Robbin Agrawal MD General Surgery  as scheduled [id number]  [phone]            Discharge Instructions/Follow-up Plans:   MD Instructions:     Follow-up with Dr. Benja Guerrero as scheduled. Keep incisions clean and dry, may remain uncovered. Do not apply lotions, creams or ointments to incisions.     Diet - as tolerated - Soft foods diet. Activity - ambulate - as tolerated - no heavy lifting >10lb. May shower - no tub baths or soaking/submerging.     No driving while taking narcotics. Do not drink alcohol while taking narcotics.   Resume other home medications.      If problems or questions arise, please call our office at [phone].     Greater than 30 minutes were spent discharging the patient       Signed:  Yann Garibay NP  3/31/2019  7:41 AM .

## 2019-04-02 PROBLEM — K57.92 DIVERTICULITIS: Status: RESOLVED | Noted: 2019-03-19 | Resolved: 2019-04-02

## 2019-04-02 PROBLEM — D72.829 LEUKOCYTOSIS: Status: RESOLVED | Noted: 2019-01-24 | Resolved: 2019-04-02

## 2019-04-02 PROBLEM — K66.8 FREE INTRAPERITONEAL AIR: Status: RESOLVED | Noted: 2019-01-24 | Resolved: 2019-04-02

## 2019-04-02 PROBLEM — R10.32 LLQ PAIN: Status: RESOLVED | Noted: 2019-01-24 | Resolved: 2019-04-02

## 2019-04-02 PROBLEM — Z80.42 FAMILY HISTORY OF PROSTATE CANCER IN FATHER: Chronic | Status: ACTIVE | Noted: 2019-04-02

## 2019-04-02 PROBLEM — K57.92 ACUTE DIVERTICULITIS: Status: RESOLVED | Noted: 2019-01-24 | Resolved: 2019-04-02

## 2019-04-02 PROBLEM — K57.80 DIVERTICULAR DISEASE OF INTESTINE WITH PERFORATION AND ABSCESS: Status: RESOLVED | Noted: 2019-03-19 | Resolved: 2019-04-02

## 2019-06-17 ENCOUNTER — HOSPITAL ENCOUNTER (OUTPATIENT)
Dept: GENERAL RADIOLOGY | Age: 64
Discharge: HOME | End: 2019-06-17
Attending: FAMILY MEDICINE
Payer: COMMERCIAL

## 2019-06-17 PROBLEM — Z98.890 H/O COLONOSCOPY: Chronic | Status: ACTIVE | Noted: 2019-06-17

## 2019-06-17 PROCEDURE — 73030 X-RAY EXAM OF SHOULDER: CPT

## 2019-11-21 PROBLEM — Z98.890 H/O COLONOSCOPY: Chronic | Status: RESOLVED | Noted: 2019-06-17 | Resolved: 2019-11-21

## 2020-12-23 PROBLEM — K57.20 DIVERTICULITIS OF COLON WITH PERFORATION: Status: RESOLVED | Noted: 2019-01-24 | Resolved: 2020-12-23

## 2021-11-17 NOTE — PROGRESS NOTES
"Speech Language Pathology  Daily Treatment     Patient Name: Zachary Moore  Age:  70 y.o., Sex:  male  Medical Record #: [id number]  Today's Date: 11/17/2021     Precautions  Precautions: Fall Risk,Weight Bearing As Tolerated Right Upper Extremity  Comments: 10/13: right femoral ORIF, now WBAT     Assessment    Pt awake and alert and seen following his bfast meal. Pt denying any swallow or esophageal difficulty/pain with SB6/TNO. Pt offered to trial EC7 or regular texture with pt declining stating he has difficulty cutting his foods and prefers the foods to be chopped currently. Per therapy notes, pt with wgt bearing as tolerated to RUE with previous injury. Pt with no delay in swallow and no s/s of difficulty with thin water from the cup. Swallow strategy sheet updated. Pt educ to request texture change to easy the chew=EC7 or regular texture with the RN if his preference is for a texture change. No further dysphagia tx indicated at this time.     Plan  SB6/TNO per pt preference.   Discharge secondary to goals met.    Discharge Recommendations: (P) Other (no further dysphagia tx indicated.)       11/17/21 0930   Speech / Dysarthria   Comments WNL   Voice   Comments WNL   Dysphagia    Dysphagia X   Diet / Liquid Recommendation Soft & Bite-Sized (6) - (Dysphagia III);Thin (0)   Nutritional Liquid Intake Rating Scale Non thickened beverages   Nutritional Food Intake Rating Scale Total oral diet with multiple consistencies but requiring special preparation or compensations   Nursing Communication Swallow Precaution Sign Posted at Head of Bed   Skilled Intervention Compensatory Strategies;Verbal Cueing   Recommended Route of Medication Administration   Medication Administration  Whole with Liquid Wash;Float Whole with Puree   Patient / Family Goals   Patient / Family Goal #1 11/17 \"I like the food I am getting.\"   Goal #1 Outcome Progressing as expected   Short Term Goals   Short Term Goal # 2 B  Pt will consume SB6/TN0 " Resting quietly, resp even, unlab. Eyes closed with relaxed facial expression during bedside report given to Jennifer Dumont RN. Snoring audible. No distress noted. meal with no overt s/sx of aspiration    Goal Outcome  # 2 B Goal met   Session Information   Priority 0  (no further tx,pt preference SB6/TNO,nurs to upgrade as appop)

## 2022-03-19 PROBLEM — K76.0 HEPATIC STEATOSIS: Status: ACTIVE | Noted: 2019-01-24

## 2022-03-19 PROBLEM — Z80.42 FAMILY HISTORY OF PROSTATE CANCER IN FATHER: Status: ACTIVE | Noted: 2019-04-02

## 2022-07-06 ENCOUNTER — OFFICE VISIT (OUTPATIENT)
Dept: INTERNAL MEDICINE CLINIC | Facility: CLINIC | Age: 67
End: 2022-07-06
Payer: COMMERCIAL

## 2022-07-06 VITALS
SYSTOLIC BLOOD PRESSURE: 110 MMHG | HEIGHT: 70 IN | BODY MASS INDEX: 34.5 KG/M2 | DIASTOLIC BLOOD PRESSURE: 72 MMHG | WEIGHT: 241 LBS | RESPIRATION RATE: 12 BRPM | OXYGEN SATURATION: 98 % | TEMPERATURE: 97.9 F | HEART RATE: 58 BPM

## 2022-07-06 DIAGNOSIS — E66.9 OBESITY (BMI 30-39.9): ICD-10-CM

## 2022-07-06 DIAGNOSIS — K76.0 HEPATIC STEATOSIS: ICD-10-CM

## 2022-07-06 DIAGNOSIS — E78.2 HYPERLIPIDEMIA, MIXED: Primary | ICD-10-CM

## 2022-07-06 PROBLEM — Z80.42 FAMILY HISTORY OF PROSTATE CANCER IN FATHER: Status: RESOLVED | Noted: 2019-04-02 | Resolved: 2022-07-06

## 2022-07-06 PROCEDURE — 1123F ACP DISCUSS/DSCN MKR DOCD: CPT | Performed by: INTERNAL MEDICINE

## 2022-07-06 PROCEDURE — 99214 OFFICE O/P EST MOD 30 MIN: CPT | Performed by: INTERNAL MEDICINE

## 2022-07-06 RX ORDER — SIMVASTATIN 20 MG
20 TABLET ORAL NIGHTLY
Qty: 30 TABLET | Refills: 5 | Status: SHIPPED | OUTPATIENT
Start: 2022-07-06

## 2022-07-06 RX ORDER — ICOSAPENT ETHYL 1000 MG/1
2 CAPSULE ORAL 2 TIMES DAILY
Qty: 180 CAPSULE | Refills: 3 | Status: SHIPPED | OUTPATIENT
Start: 2022-07-06 | End: 2022-10-04

## 2022-07-06 RX ORDER — FENOFIBRATE 145 MG/1
145 TABLET, COATED ORAL DAILY
Qty: 30 TABLET | Refills: 5 | Status: CANCELLED | OUTPATIENT
Start: 2022-07-06

## 2022-07-06 ASSESSMENT — ENCOUNTER SYMPTOMS
GASTROINTESTINAL NEGATIVE: 1
EYES NEGATIVE: 1
BACK PAIN: 1
RESPIRATORY NEGATIVE: 1

## 2022-07-06 ASSESSMENT — PATIENT HEALTH QUESTIONNAIRE - PHQ9
SUM OF ALL RESPONSES TO PHQ QUESTIONS 1-9: 0
SUM OF ALL RESPONSES TO PHQ9 QUESTIONS 1 & 2: 0
1. LITTLE INTEREST OR PLEASURE IN DOING THINGS: 0
SUM OF ALL RESPONSES TO PHQ QUESTIONS 1-9: 0
2. FEELING DOWN, DEPRESSED OR HOPELESS: 0

## 2022-07-06 NOTE — PROGRESS NOTES
Morteza Abad D.O. [facility]  [address], [place] [address]  Tel: [phone]    Office Visit: Follow Up     Patient Name: Jaret Lynch   :  1955   MRN:   [id number]      Today's Date: 22 9:17 AM    Subjective     The patient is a 69y.o.-year-old male who presents for follow-up. His lab results from last visit revealed normal lipid panel and otherwise unremarkable labs. He was given the diagnosis of hepatic steatosis by Dr. Lita Lazar about 1 year ago, but his most recent liver panel was normal. He states he is doing well with no new complaints. He does state he has a mild pain in lower right flank. He states this has been going on for about 4-5 months. He states this will bother him when he wakes up at night or when he bends over a lot. He states the pain is about a 2/10 in intensity and is a dull ache and comes and goes. He denies any pain with urination. Denies any numbness or tingling. Denies any radiation of the pain. Review of Systems   Constitutional: Negative. HENT: Negative. Eyes: Negative. Respiratory: Negative. Cardiovascular: Negative. Gastrointestinal: Negative. Endocrine: Negative. Genitourinary: Negative. Musculoskeletal: Positive for back pain. Skin: Negative. Neurological: Negative.        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 Alert/oriented x3. In no acute distress. Head: Normocephalic No trauma. No deformity. No bruits. Neck: Supple. ROM normal. No tenderness. No masses. Cardiac: Heart with normal rate/rhythm. No murmurs. No gallops. Pulses normal.   Pulmonary: Lungs clear to auscultation bilaterally. In no respiratory distress. No wheezing. No rales. No rhonci. Psychiatric: Normal thought content. Normal behavior. Normal judgment. Recommendations     Assessment:  Patient Active Problem List   Diagnosis    Hyperlipidemia, mixed    Family history of prostate cancer in father    Hepatic steatosis      Plan:  -Patient is doing well. We discussed that his triglycerides are low but on the higher end of normal and have increased since his last results in August 2021. His LDL has also increased since August 2021. We discussed dietary factors that affect his lipid panel. We discussed his Tricor. He states that he has been on Tricor for years and that his old doctor who prescribed it for him was a big proponent of prescriptions. I discussed with him that Tricor is no longer used for mildly elevated to normal try triglyceride levels and the patient has agreed to stop taking this and will start taking the Vascepa. -We will see him back in 6 months with repeat lipid panel and liver profile  -The patient expresses understanding of the plan as I've explained it to him and is in agreement with the current plan.     Follow Up: 6 months; liver profile, and lipid profile     Time Spent in Patient Room: 15 minutes  Time Spent Pre- and Post Reviewing patient's chart: 15 minutes  Total Time: 30 minutes    Signed: Andrea Darby D.O.  07/06/22  9:17 AM

## 2022-11-09 ENCOUNTER — OFFICE VISIT (OUTPATIENT)
Dept: INTERNAL MEDICINE CLINIC | Facility: CLINIC | Age: 67
End: 2022-11-09
Payer: COMMERCIAL

## 2022-11-09 VITALS
TEMPERATURE: 98.1 F | HEIGHT: 70 IN | SYSTOLIC BLOOD PRESSURE: 126 MMHG | DIASTOLIC BLOOD PRESSURE: 72 MMHG | BODY MASS INDEX: 34.5 KG/M2 | OXYGEN SATURATION: 94 % | RESPIRATION RATE: 12 BRPM | WEIGHT: 241 LBS | HEART RATE: 75 BPM

## 2022-11-09 DIAGNOSIS — M54.2 NECK PAIN: Primary | ICD-10-CM

## 2022-11-09 DIAGNOSIS — S16.1XXA STRAIN OF NECK MUSCLE, INITIAL ENCOUNTER: ICD-10-CM

## 2022-11-09 PROBLEM — R73.09 ELEVATED HEMOGLOBIN A1C: Status: ACTIVE | Noted: 2022-11-09

## 2022-11-09 PROCEDURE — 1123F ACP DISCUSS/DSCN MKR DOCD: CPT | Performed by: INTERNAL MEDICINE

## 2022-11-09 PROCEDURE — 99214 OFFICE O/P EST MOD 30 MIN: CPT | Performed by: INTERNAL MEDICINE

## 2022-11-09 RX ORDER — TIZANIDINE 2 MG/1
2 TABLET ORAL NIGHTLY PRN
Qty: 30 TABLET | Refills: 0 | Status: SHIPPED | OUTPATIENT
Start: 2022-11-09

## 2022-11-09 ASSESSMENT — ENCOUNTER SYMPTOMS
GASTROINTESTINAL NEGATIVE: 1
RESPIRATORY NEGATIVE: 1
BACK PAIN: 1

## 2022-11-09 ASSESSMENT — PATIENT HEALTH QUESTIONNAIRE - PHQ9
SUM OF ALL RESPONSES TO PHQ9 QUESTIONS 1 & 2: 0
2. FEELING DOWN, DEPRESSED OR HOPELESS: 0
SUM OF ALL RESPONSES TO PHQ QUESTIONS 1-9: 0
1. LITTLE INTEREST OR PLEASURE IN DOING THINGS: 0
SUM OF ALL RESPONSES TO PHQ QUESTIONS 1-9: 0

## 2022-11-09 NOTE — PROGRESS NOTES
John Paul Camargo D.O. [facility]  Lisa Wolf[address] [place] [address]  Tel: [phone]    Office Visit: Follow Up     Patient Name: Karen Leonardo   :  1955   MRN:   [id number]      Today's Date: 22 1:47 PM    Subjective     The patient is a 69y.o.-year-old male who presents for follow-up. Today:   The pt states he has had upper neck and shoulder/back pain for about 1 month. He states it starts up in his left suboccipital region and then goes down into his shoulder and will radiate down his arm. He states he does have some tingling in his arm but does not have numbness or tingling in the fingers or hand. He states the tingling down his arm is intermittent when he moves his head. He denies any other symptoms at this time, including nausea, vomiting, numbness or tingling in his hands, fever, headaches, weakness, etc.    He denies any injury to his. He states the pain feels like a pull in his upper back/lower neck when he moves his head. He states sometimes it will go away if he will keep his head straight and perfect. He states he does go to [employer] and thinks he might've overdone it at the gym. Review of Systems   Constitutional: Negative. HENT: Negative. Respiratory: Negative. Cardiovascular: Negative. Gastrointestinal: Negative. Genitourinary: Negative. Musculoskeletal:  Positive for back pain and neck pain. Skin: Negative. Neurological: Negative. Psychiatric/Behavioral: Negative.         Past Medical History:   Diagnosis Date    Anxiety and depression     hx-- no meds in yrs per pt    Diverticulitis     with perforation    Diverticulitis of colon with perforation 2019    Dyslipidemia     Family history of prostate cancer in father 2019    GERD (gastroesophageal reflux disease)     HX-- not a problem in several yrs-- no meds    Hepatic steatosis 2019    Hyperlipidemia, mixed 2015    Obesity (BMI 30-39. 9) 7/6/2022     Social History     Socioeconomic History    Marital status:      Spouse name: Not on file    Number of children: Not on file    Years of education: Not on file    Highest education level: Not on file   Occupational History    Not on file   Tobacco Use    Smoking status: Never    Smokeless tobacco: Never   Substance and Sexual Activity    Alcohol use: Yes    Drug use: No    Sexual activity: Not on file   Other Topics Concern    Not on file   Social History Narrative    Not on file     Social Determinants of Health     Financial Resource Strain: Not on file   Food Insecurity: Not on file   Transportation Needs: Not on file   Physical Activity: Not on file   Stress: Not on file   Social Connections: Not on file   Intimate Partner Violence: Not on file   Housing Stability: Not on file         Current Outpatient Medications   Medication Sig    simvastatin (ZOCOR) 20 MG tablet Take 1 tablet by mouth nightly    aspirin 81 MG EC tablet Take 81 mg by mouth daily     No current facility-administered medications for this visit. Objective     Vitals:    11/09/22 1330   BP: 126/72   Site: Left Upper Arm   Position: Sitting   Cuff Size: Large Adult   Pulse: 75   Resp: 12   Temp: 98.1 °F (36.7 °C)   TempSrc: Temporal   SpO2: 94%   Weight: 241 lb (109.3 kg)   Height: 5' 10\" (1.778 m)        Physical Exam:  Constitutional: Appears well kempt. Alert/oriented x3. In no acute distress. Head: Normocephalic No trauma. No deformity. MSK: No pain to palpation over the left shoulder, left neck, left upper back. Patient has full range of motion in the left arm and shoulder. No pain elicited today on exam.  Psychiatric: Normal thought content. Normal behavior. Normal judgment. Recommendations     Assessment:  Patient Active Problem List   Diagnosis    Hyperlipidemia, mixed    Hepatic steatosis    Obesity (BMI 30-39. 9)    Elevated hemoglobin A1c    Neck pain      Status of above conditions: Stable    Plan:  -Patient is a very pleasant 68-year-old male who follows up today with a chief complaint of upper back/lower neck pain x1 month. He states he did not have an injury but that he did recently increase his workout at [employer] and does overhead lifting while working out. He describes his pain as a pulling like sensation in his upper left back/lower neck when he moves his head forward or side to side and certain directions. He also describes some tingling that goes down his left arm when doing this motion as well. -Advised patient to use caution with muscle relaxer while driving or working as it may make him a little sleepy. -I recommend the patient continue over-the-counter ibuprofen and Tylenol as needed. We will give him a muscle relaxer to help relax any spastic muscles in his upper back or neck. We will get an x-ray to evaluate for any cervical stenosis or arthritis. We will also give him Voltaren gel for the area as needed.  -We will follow-up with him in January at his next appointment. He may also see a massage therapist or chiropractor in the meantime.  -Patient denied physical therapy offer at this time. I recommend that he do some stretching exercises with his neck, these were shown to him during the exam.    -Previous medical records and/or labs/tests available to me reviewed, any records outstanding not available requested  -The risks, benefits, and medical necessity of all medications, tests labs, and any other orders that were ordered at today's visit were discussed with the patient including all elective or patient requested orders. Decision to order or not order tests or based on this risk/benefit ratio and medical necessity.  -The patient expresses understanding of the plan as I've explained it to him and is in agreement with the current plan.     Follow Up: January    Total Time: 30 minutes (chart reviewing and in-exam time)    Signed: Renay Joshua D.O.  11/09/22  1:47 PM

## 2022-11-11 DIAGNOSIS — M54.2 NECK PAIN: ICD-10-CM

## 2022-11-11 DIAGNOSIS — S16.1XXA STRAIN OF NECK MUSCLE, INITIAL ENCOUNTER: ICD-10-CM

## 2022-11-11 DIAGNOSIS — M47.812 MULTILEVEL CERVICAL SPONDYLOSIS WITHOUT MYELOPATHY: Primary | ICD-10-CM

## 2022-12-14 ENCOUNTER — OFFICE VISIT (OUTPATIENT)
Dept: NEUROSURGERY | Age: 67
End: 2022-12-14
Payer: COMMERCIAL

## 2022-12-14 VITALS
TEMPERATURE: 97 F | DIASTOLIC BLOOD PRESSURE: 79 MMHG | HEART RATE: 66 BPM | OXYGEN SATURATION: 95 % | HEIGHT: 70 IN | WEIGHT: 243 LBS | BODY MASS INDEX: 34.79 KG/M2 | SYSTOLIC BLOOD PRESSURE: 126 MMHG

## 2022-12-14 DIAGNOSIS — M54.2 NECK PAIN ON LEFT SIDE: Primary | ICD-10-CM

## 2022-12-14 PROCEDURE — 1123F ACP DISCUSS/DSCN MKR DOCD: CPT | Performed by: NURSE PRACTITIONER

## 2022-12-14 PROCEDURE — 99203 OFFICE O/P NEW LOW 30 MIN: CPT | Performed by: NURSE PRACTITIONER

## 2022-12-14 ASSESSMENT — PATIENT HEALTH QUESTIONNAIRE - PHQ9
SUM OF ALL RESPONSES TO PHQ9 QUESTIONS 1 & 2: 0
SUM OF ALL RESPONSES TO PHQ QUESTIONS 1-9: 0
SUM OF ALL RESPONSES TO PHQ QUESTIONS 1-9: 0
2. FEELING DOWN, DEPRESSED OR HOPELESS: 0
1. LITTLE INTEREST OR PLEASURE IN DOING THINGS: 0
SUM OF ALL RESPONSES TO PHQ QUESTIONS 1-9: 0
SUM OF ALL RESPONSES TO PHQ QUESTIONS 1-9: 0

## 2022-12-14 NOTE — PROGRESS NOTES
[facility] NOTE:   History of Present Illness:    CC: Left-sided neck and trapezius pain    Koko Franco is a 69 y.o. left handed male who presents today for evaluation of his left-sided neck and trapezius pain. Patient states that about a month ago he began having a constant pain left side of his neck that runs down into his trapezius ending in his shoulder. Patient states he has wondered if it might possibly be a pulled muscle. Patient states that depending on his positioning he does sometimes experience tingling that runs all the way down his arm. Cervical x-rays reveal extreme degenerative arthritis throughout. Past Medical History:   Diagnosis Date    Anxiety and depression     hx-- no meds in yrs per pt    Diverticulitis     with perforation    Diverticulitis of colon with perforation 1/24/2019    Dyslipidemia     Family history of prostate cancer in father 4/2/2019    GERD (gastroesophageal reflux disease)     HX-- not a problem in several yrs-- no meds    Hepatic steatosis 1/24/2019    Hyperlipidemia, mixed 12/14/2015    Obesity (BMI 30-39. 9) 7/6/2022      Past Surgical History:   Procedure Laterality Date    COLONOSCOPY  2018    Dr Zhao Pimentel    COLONOSCOPY  2006    KNEE ARTHROSCOPY Left 1980's    OTHER SURGICAL HISTORY      - partial large intestine removed, march, 2019.      SC ABDOMEN SURGERY PROC UNLISTED  03/2019    spleenic flexure mobilization    REFRACTIVE SURGERY  30 yrs ago    TONSILLECTOMY      as a child    UPPER GASTROINTESTINAL ENDOSCOPY      VASCULAR SURGERY  02/2019    PICC IN right upper arm     No Known Allergies   Family History   Problem Relation Age of Onset    Diabetes Father         Type II (NIDDM)    Heart Disease Father         CHF    Cancer Father         Colon, Prostate, & squamous cell    Cancer Mother         Leukemia    Cancer Sister         Lung    Cancer Brother         Colon      Social History     Socioeconomic History Marital status:      Spouse name: Not on file    Number of children: Not on file    Years of education: Not on file    Highest education level: Not on file   Occupational History    Not on file   Tobacco Use    Smoking status: Never    Smokeless tobacco: Never   Substance and Sexual Activity    Alcohol use: Yes    Drug use: No    Sexual activity: Not on file   Other Topics Concern    Not on file   Social History Narrative    Not on file     Social Determinants of Health     Financial Resource Strain: Not on file   Food Insecurity: Not on file   Transportation Needs: Not on file   Physical Activity: Not on file   Stress: Not on file   Social Connections: Not on file   Intimate Partner Violence: Not on file   Housing Stability: Not on file     Current Outpatient Medications   Medication Sig Dispense Refill    diclofenac sodium (VOLTAREN) 1 % GEL Apply 2 g topically 4 times daily 100 g 1    simvastatin (ZOCOR) 20 MG tablet Take 1 tablet by mouth nightly 30 tablet 5    aspirin 81 MG EC tablet Take 81 mg by mouth daily       No current facility-administered medications for this visit. Patient Active Problem List   Diagnosis    Hyperlipidemia, mixed    Hepatic steatosis    Obesity (BMI 30-39. 9)    Elevated hemoglobin A1c    Neck pain    Cervical strain          ROS Review of Systems    Constitutional:                    No recent weight changes, fever, fatigue, sleep difficulties, loss of appetite   ENT/Mouth:  No hearing loss, No ringing in the ears, chronic sinus problem, nose bleeds,sore throat, voice change, hoarseness, swollen glands in neck, or difficulties with chewing and swallowing. Cardiovascular:  No chest pain/angina pectoris, palpitations, swelling of feet/ankles/hands, or calf pain while walking. Respiratory: No chronic or frequent coughs, spitting up blood, shortness of breath, No asthma, or wheezing.      Gastrointestinal: No a bdominal pain, heartburn, nausea, vomiting, constipation, or frequent diarrhea     Genitourinary: No frequent urination, burning or painful urination, or blood in urine     Musculoskeletal:   POS left sided neck pain and trapezius pain     Integument:   No rash/itching     Neurological:   Dizziness/vertigo, No numbness/tingling sensation, tremors, No weakness in limbs, frequent or recurring headaches, memory loss or confusion. Physical Exam:    General: No acute distress  Head normocephalic and atraumatic  Mood and affect appropriate  CV: Regular rate   Resp: No increased work of breathing  Skin: warm and dry   Awake, alert, and oriented   Speech fluent  Eyes open spontaneously   Face symmetric and tongue midline on protrusion  Sternocleidomastoid and trapezius 5/5  No mid-line cervical, thoracic, or lumbar tenderness to palpation   Patient with strength exam as follows:   Upper Extremities: Right Left      Deltoid  5 5    Biceps  5 5    Triceps  5 5      5 5   Hand Intrinsics  5 5  Wrist flexors/extensors  5 5     Lower Extremities:      Hip Flex 5 5    Quads  5 5    Hamstrings 5 5    Dorsiflex 5 5    Plantarflex 5 5    EHL  5 5  Sensation intact to light touch and pin-prick   DTR 2+  No clonus or babinski present   No Verdin's sign present bilaterally   Gait normal    Assessment & Plan:  Madhav Pelayo is a 69 y.o. male who presents to be evaluated for his left neck and trapezius pain. I am sending the patient for a 6 week course of cervical physical therapy. The pt will follow up with Np in 8 weeks. No diagnosis found. Notes are transcribed with Meta, a medical voice recording dictation service, and may contain minor errors.     Martir Lacey NP  [id number]

## 2022-12-20 ENCOUNTER — HOSPITAL ENCOUNTER (OUTPATIENT)
Dept: PHYSICAL THERAPY | Age: 67
Setting detail: RECURRING SERIES
Discharge: HOME | End: 2022-12-23
Payer: COMMERCIAL

## 2022-12-20 PROCEDURE — 97530 THERAPEUTIC ACTIVITIES: CPT

## 2022-12-20 PROCEDURE — 97162 PT EVAL MOD COMPLEX 30 MIN: CPT

## 2022-12-20 PROCEDURE — 97110 THERAPEUTIC EXERCISES: CPT

## 2022-12-20 NOTE — THERAPY EVALUATION
Alana Cotton  : 1955  Primary: Lnidy [place] ([facility])  Secondary:  Pushpa Coronado @ [address] [place] [id number]  Phone: [phone]  Fax: [phone] Plan Frequency: 2x/wk x 8 wks  Plan of Care/Certification Expiration Date: 23    PT Visit Info:    Plan Frequency: 2x/wk x 8 wks  Plan of Care/Certification Expiration Date: 23  Total # of Visits Approved: 60 (including eval)  Total # of Visits to Date: 1  Progress Note Counter: 1    Visit Count:  1                OUTPATIENT PHYSICAL THERAPY:             OP NOTE TYPE: Initial Assessment 2022               Episode  Appt Desk         Treatment Diagnosis:  Cervicalgia (M54.2)  Abnormal posture (R29.3)  Medical/Referring Diagnosis:  Neck pain on left side [M54.2]  Referring Provider:  FLORA Garcia CNP  Orders:  PT Eval and Treat  Return Appt:    Date of Onset:  Onset Date: 10/03/22    Allergies:  Patient has no known allergies. Restrictions/Precautions:       Medications Last Reviewed:  2022     SUBJECTIVE   History of Injury/Illness (Reason for Referral):    Pt is a 68 yo right handed male referred to physical therapy due to left sided neck pain with insidious onset 2 months ago. Pain/Symptom Location: left upper traps; occasional tingling in L UE C5 and C6 distributions ending at his forearm            Aggravating Factors/ Functional limitations: turning his head left, checking blind spot while driving, and prolonged sitting at his desk. Alleviating Factors/Positions/Motions: sitting straight up and not moving    Falls: none    Diagnostic Imaging:      FINDINGS: There is loss of disc space at multiple levels with anterior   osteophyte formation multiple levels in the cervical spine. There is no   prevertebral soft tissue edema. There is bony encroachment of the right neural   foramen at C5-6 and of the left neural foramen at C5-6.  The lung apices are clear. Patient Stated Goal(s):  Pt is hoping to be able to function without limitation due to pain or recurrence of neck pain. Initial:      5/10           Post Session:      5/10  Past Medical History/Comorbidities:   Mr. Abel Ontiveros  has a past medical history of Anxiety and depression, Diverticulitis, Diverticulitis of colon with perforation, Dyslipidemia, Family history of prostate cancer in father, GERD (gastroesophageal reflux disease), Hepatic steatosis, Hyperlipidemia, mixed, and Obesity (BMI 30-39.9). Mr. Abel nOtiveros  has a past surgical history that includes Refractive surgery (30 yrs ago); Colonoscopy (2018); Colonoscopy (2006); Tonsillectomy; Knee arthroscopy (Left, 1980's); vascular surgery (02/2019); pr abdomen surgery proc unlisted (03/2019); other surgical history; and Upper gastrointestinal endoscopy. Social History/Living Environment:      Prior Level of Function/Work/Activity: commercial contractor     Learning:   Does the patient/guardian have any barriers to learning?: No barriers  What is the preferred language of the patient/guardian?: English  How does the patient/guardian prefer to learn new concepts?: Listening; Reading; Demonstration; Pictures/Videos     Fall Risk Scale:    Kumar Total Score: 15  Kumar Fall Risk: Low (0-24)     OBJECTIVE   Observation        Posture: forward head/shoulders, dec lumbar lordosis, scoliosis with R UT SB        ________________________________________________________________________________________________  Range of Motion     Cervical    AROM (degrees)   Flexion 54   Extension 45 (inc pain in L UT)   Right side bending  30 (inc pain in L UT)   Left side bending 40   Right rotation 54   Left rotation 56 (inc tightness in L UT)     ________________________________________________________________________________________________  Strength         Upper Extremity    Joint:      LEFT  RIGHT    dynamometry Level 2 95 lbs 105 lbs   Pinch  dynamometry ________________________________________________________________________________________________  [facility]        Sensation: unremarkable        NEUROLOGICAL SCREEN: Pt denies diplopia, dysarthria, dysphagia, drop attacks, dizziness, nausea, numbness and no visible resting nystagmus.     -Dermatomes  Date:     Right Left   C4: upper traps     C5: Over deltoids     C6: index     C7: middle     C8: medial hand       -Myotomes: WFL for all Date:     Right Left   C1-3: cervical rotation     C4: shoulder shrug     C5: shoulder abd     C6: elbow flxn/wrist ext     C7: elbow ext/wrist flxn     C8: thumb ext     T1: finger abd         -REFLEXES Date:     Right Left   C5  (Biceps) 2+ 2+   C6  (Biceps or Brachioradialis) 2+ 2+   C7  (Triceps)     Patellar reflex           Physical Performance Tests:  Scapular Endurance Test: dnt  Neck Flexor Endurance test: dnt    Special Tests  Spurling's: positive bilat    ASSESSMENT   Initial Assessment:           Patient presents with signs and symptoms that are consistent with: pain in cervical spine due to degenerative changes with a flexion preference. The functional deficits are as follows: turning his head left, checking blind spot while driving, and prolonged sitting at his desk. The current impairments include: Decreased strength, dec ROM, muscle coordination, pain, and abnormal posture. Recommending skilled PT: manual therapeutic techniques (as appropriate), therapeutic exercises and activities, balance interventions, and a comprehensive home exercise program to address the current impairments, as listed above. Pt will benefit from skilled PT (medically necessary) to address above deficits affecting participation in basic ADLs and overall functional tolerance. Problem List: (Impacting functional limitations):     Body Structures, Functions, Activity Limitations Requiring Skilled Therapeutic Intervention: Decreased functional mobility ; Decreased ADL status; Decreased body mechanics; Decreased ROM; Decreased safe awareness; Decreased tolerance to work activity; Increased pain; Decreased posture; Decreased coordination     Therapy Prognosis:   Therapy Prognosis: Good     Initial Assessment Complexity: Decision Making: Medium Complexity    PLAN   Effective Dates: 2/20/22 TO Plan of Care/Certification Expiration Date: 03/20/23   Frequency/Duration: Plan Frequency: 2x/wk x 8 wks   Interventions Planned (Treatment may consist of any combination of the following):    Current Treatment Recommendations: Strengthening; ROM; Balance training; Functional mobility training; Neuromuscular re-education; Manual; Pain management; Home exercise program; Patient/Caregiver education & training; Therapeutic activities; Modalities     Goals: (Goals have been discussed and agreed upon with patient.)  Short Term Goals: Time Frame: 4 weeks  Pt will be compliant with initial HEP. Pt will decrease worst pain to 4/10 with functional activities. Discharge Goals: Time Frame: 12 weeks  Pt will decrease worst pain to 1/10 with all functional activities. Pt will improve left rotation by 25% or better in order safely check his blind spot while driving. Pt will have no c/o radicular symptoms. Pt will be able to work without limitation or exacerbation of symptoms. Pt will be able to perform household chores without limitation. Outcome Measure: Tool Used: Neck Disability Index (NDI)  Score:  Initial: 2/50  Most Recent: X/50 (Date: -- )   Interpretation of Score: The Neck Disability Index is a revised form of the Oswestry Low Back Pain Index and is designed to measure the activities of daily living in person's with neck pain. Each section is scored on a 0-5 scale, 5 representing the greatest disability. The scores of each section are added together for a total score of 50.      Medical Necessity:   Skilled intervention continues to be required due to decreased strength, ROM, balance, and functional mobility. Reason for Services/Other Comments:  Patient continues to require skilled intervention due to decreased strength, balance, and ROM with increased pain affecting pt functional mobility. Total Duration:  Time In: 0929  Time Out: 12    Regarding Jose NELSON Olguinee's therapy, I certify that the treatment plan above will be carried out by a therapist or under their direction.   Thank you for this referral,  Brent Shelton, PT, DPT     Referring Provider Signature: FLORA Oneill* _______________________________ Date _____________        Post Session Pain  Charge Capture  PT Visit Info MD Guidelines  MyChart

## 2022-12-20 NOTE — PROGRESS NOTES
Quentin Bar  : 1955  Primary: Agatha [place] ([facility])  Secondary:  [address] @ [address] [facility]  [place] [address]  Phone: [phone]  Fax: [phone] Plan Frequency: 2x/wk x 8 wks  Plan of Care/Certification Expiration Date: 23    PT Visit Info:  Plan Frequency: 2x/wk x 8 wks  Plan of Care/Certification Expiration Date: 23  Total # of Visits Approved: 60 (including eval)  Total # of Visits to Date: 1  Progress Note Counter: 1   Visit Count:  1   OUTPATIENT PHYSICAL THERAPY:OP NOTE TYPE: Treatment Note 2022       Episode  }Appt Desk             Treatment Diagnosis:  Cervicalgia (M54.2)  Abnormal posture (R29.3)  Medical/Referring Diagnosis:  Neck pain on left side [M54.2]  Referring Provider:  FLORA Garner CNP  Orders:  PT Eval and Treat   Date of Onset:  Onset Date: 10/03/22   Allergies:   Patient has no known allergies. Restrictions/Precautions:    Interventions Planned (Treatment may consist of any combination of the following):    Current Treatment Recommendations: Strengthening; ROM; Balance training; Functional mobility training; Neuromuscular re-education; Manual; Pain management; Home exercise program; Patient/Caregiver education & training; Therapeutic activities; Modalities     REASON FOR TREATMENT: pain in cervical spine due to degenerative changes with a flexion preference. Functional limitations reported at initial Eval: turning his head left, checking blind spot while driving, and prolonged sitting at his desk. REASON FOR TREATMENT: pain in cervical spine due to degenerative changes with a flexion preference. Functional limitations reported at initial Eval: turning his head left, checking blind spot while driving, and prolonged sitting at his desk.     Subjective Comments: see eval.     Initial:}     5/10              Post Session:        5/10  Medications Last Reviewed:  2022  Updated Objective Findings:  See evaluation note from today  Treatment   TREATMENT GRID: Exercises and activities per grid below to improve mobility, strength, , and overall function. Required minimal visual and verbal cues to promote proper body alignment and promote proper body posture. Progressed resistance, range and complexity of movement as indicated. Tx area: cervical spine Date:  12/20/22 Date:   Date:     Activity/Exercise Parameters Parameters Parameters   HEP review Yes; see below     Pt education yes; see below                                                       HEP: chin tucks, pec stretch on doorway, scap squeezes     MANUAL THERAPY:      MODALITIES:      Education: Pt education for initial HEP safety, exercise frequency and duration, symptom control, mechanics, fall risk, and anatomy and physiology of present condition/healing time. Treatment/Session Summary:    Treatment Assessment: see eval.     Communication/Consultation:  None today  Equipment provided today:  None  Recommendations/Intent for next treatment session: Next visit will focus on advancements to more challenging activities.      Total Treatment Billable Duration:  24 minutes plus eval  Time In: 0929  Time Out: 1010       Manual Therapy: (0 minutes)     Ther-Ex: (14 minutes)     Ther-Act: (10 minutes)     Neuro Re-ed (0 minutes)     Modalities: (0 minutes)    Ngoc Mario, PT, DPT       Charge Capture  }Post Session Pain  PT Visit Info  [facility]  MD Guidelines  Scanned Media  Benefits  MyChart    Future Appointments   Date Time Provider Yosvany Miranda   12/28/2022  8:45 AM [facility], PT [facility]   12/29/2022  8:45 AM [facility], PT [facility]   1/4/2023  1:45 PM [facility], PT [facility]   1/10/2023  1:45 PM [facility], PT [facility]   1/11/2023 10:00 AM Martine Ortega DO [address] [facility]   1/12/2023  1:45 PM [facility], PT [facility]   2/8/2023  9:00 AM Luci Juan Tariq Diaz, APRN - CNP [facility]

## 2022-12-28 ENCOUNTER — HOSPITAL ENCOUNTER (OUTPATIENT)
Dept: PHYSICAL THERAPY | Age: 67
Setting detail: RECURRING SERIES
Discharge: HOME | End: 2022-12-31
Payer: COMMERCIAL

## 2022-12-28 PROCEDURE — 97110 THERAPEUTIC EXERCISES: CPT

## 2022-12-28 PROCEDURE — 97140 MANUAL THERAPY 1/> REGIONS: CPT

## 2022-12-28 PROCEDURE — 97530 THERAPEUTIC ACTIVITIES: CPT

## 2022-12-28 NOTE — PROGRESS NOTES
Linda Daugherty  : 1955  Primary: Ne [place] ([facility])  Secondary:  [address] [address]  Phone: [phone]  Fax: [phone] Plan Frequency: 2x/wk x 8 wks  Plan of Care/Certification Expiration Date: 23      PT Visit Info:  Plan Frequency: 2x/wk x 8 wks  Plan of Care/Certification Expiration Date: 23  Total # of Visits Approved: 60 (including eval)  Total # of Visits to Date: 2  Progress Note Counter: 2     Visit Count:  2   OUTPATIENT PHYSICAL THERAPY:OP NOTE TYPE: Treatment Note 2022       Episode  }Appt Desk             Treatment Diagnosis:  Cervicalgia (M54.2)  Abnormal posture (R29.3)  Medical/Referring Diagnosis:  Neck pain on left side [M54.2]  Referring Provider:  FLORA Rees CNP  Orders:  PT Eval and Treat   Date of Onset:  Onset Date: 10/03/22     Allergies:   Patient has no known allergies. Restrictions/Precautions:    Interventions Planned (Treatment may consist of any combination of the following):    Current Treatment Recommendations: Strengthening; ROM; Balance training; Functional mobility training; Neuromuscular re-education; Manual; Pain management; Home exercise program; Patient/Caregiver education & training; Therapeutic activities; Modalities     REASON FOR TREATMENT: pain in cervical spine due to degenerative changes with a flexion preference. Functional limitations reported at initial Eval: turning his head left, checking blind spot while driving, and prolonged sitting at his desk. REASON FOR TREATMENT: pain in cervical spine due to degenerative changes with a flexion preference. Functional limitations reported at initial Eval: turning his head left, checking blind spot while driving, and prolonged sitting at his desk. Subjective Comments: Pt says his neck is feeling a little better today, and he is compliant with his HEP.       Initial:}     4/10              Post Session:        3/10  Medications Last Reviewed:  12/28/2022  Updated Objective Findings:  None Today  Treatment   TREATMENT GRID: Exercises and activities per grid below to improve mobility, strength, , and overall function. Required minimal visual and verbal cues to promote proper body alignment and promote proper body posture. Progressed resistance, range and complexity of movement as indicated. Tx area: cervical spine Date:  12/20/22 Date:  12/28/22 Date:     Activity/Exercise Parameters Parameters Parameters   UBE  5 min    Pt education  Yes; see below    Lift offs on wall  3 x 10     Open books on wall  1 min x 2 bilat    Chin tucks  Supine 30x (low austin in sitting)    Pec stretch on doorway   30 sec x 2                               HEP: chin tucks, pec stretch on doorway, scap squeezes     MANUAL THERAPY: Cervical: STM UT/LS, c-spine PAs, side glides, suboccipital release     Joint mobilization, Soft tissue mobilization and Manipulation was utilized and necessary because of the patient's restricted joint motion, painful spasm, loss of articular motion and restricted motion of soft tissue. MODALITIES:      Education: Educated pt on using thera cane. Pt education for HEP safety, exercise frequency and duration, symptom control, mechanics, fall risk, and anatomy and physiology of present condition/healing time. Treatment/Session Summary:    Treatment Assessment: Pt demo good tolerance to first follow up visit with progression further into manual intervention and progression into therapeutic exercises and activities to simulate functional limitations. Communication/Consultation:  None today  Equipment provided today:  None  Recommendations/Intent for next treatment session: Next visit will focus on advancements to more challenging activities.      Total Treatment Billable Duration: 40 minutes   Time In: 0845  Time Out: 0054       Manual Therapy: (16 minutes)     Ther-Ex: (14 minutes) Ther-Act: (10 minutes)     Neuro Re-ed (0 minutes)     Modalities: (0 minutes)    Prisca Drafts, PT, DPT       Charge Capture  }Post Session Pain  PT Visit Info  Med[facility] Portal  MD Guidelines  Scanned Media  Benefits  MyChart    Future Appointments   Date Time Provider Yosvany Miranda   12/29/2022  8:45 AM [facility], PT [facility]   1/4/2023  1:45 PM [facility], PT [facility]   1/10/2023  1:45 PM [facility], PT [facility]   1/11/2023 10:00 AM Rajendra Galindo DO [address] [facility] AMB   1/12/2023  1:45 PM [facility], [facility]   2/8/2023  9:00 AM FLORA Alfonso - [facility] AM[facility]

## 2022-12-29 ENCOUNTER — HOSPITAL ENCOUNTER (OUTPATIENT)
Dept: PHYSICAL THERAPY | Age: 67
Setting detail: RECURRING SERIES
End: 2022-12-29
Payer: COMMERCIAL

## 2022-12-29 PROCEDURE — 97110 THERAPEUTIC EXERCISES: CPT

## 2022-12-29 PROCEDURE — 97140 MANUAL THERAPY 1/> REGIONS: CPT

## 2022-12-29 PROCEDURE — 97530 THERAPEUTIC ACTIVITIES: CPT

## 2022-12-29 NOTE — PROGRESS NOTES
[facility]  : 1955  Primary: Jose A [place] ([facility])  Secondary:  [address] [address]  Phone: [phone]  Fax: [phone] Plan Frequency: 2x/wk x 8 wks  Plan of Care/Certification Expiration Date: 23      PT Visit Info:  Plan Frequency: 2x/wk x 8 wks  Plan of Care/Certification Expiration Date: 23  Total # of Visits Approved: 60 (including eval)  Total # of Visits to Date: 3  Progress Note Counter: 3     Visit Count:  3   OUTPATIENT PHYSICAL THERAPY:OP NOTE TYPE: Treatment Note 2022       Episode  }Appt Desk             Treatment Diagnosis:  Cervicalgia (M54.2)  Abnormal posture (R29.3)  Medical/Referring Diagnosis:  Neck pain on left side [M54.2]  Referring Provider:  Valere Boxer, APRN - CNP  Orders:  PT Eval and Treat   Date of Onset:  Onset Date: 10/03/22     Allergies:   Patient has no known allergies. Restrictions/Precautions:    Interventions Planned (Treatment may consist of any combination of the following):    Current Treatment Recommendations: Strengthening; ROM; Balance training; Functional mobility training; Neuromuscular re-education; Manual; Pain management; Home exercise program; Patient/Caregiver education & training; Therapeutic activities; Modalities     REASON FOR TREATMENT: pain in cervical spine due to degenerative changes with a flexion preference. Functional limitations reported at initial Eval: turning his head left, checking blind spot while driving, and prolonged sitting at his desk. REASON FOR TREATMENT: pain in cervical spine due to degenerative changes with a flexion preference. Functional limitations reported at initial Eval: turning his head left, checking blind spot while driving, and prolonged sitting at his desk. Subjective Comments: Pt says his neck is feeling a little sore today.  He worked out at [employer] yesterday after PT and thinks he may have done a little too much. He is compliant with his HEP. Initial:}     4/10              Post Session:        3/10  Medications Last Reviewed:  12/29/2022  Updated Objective Findings:  None Today  Treatment   TREATMENT GRID: Exercises and activities per grid below to improve mobility, strength, , and overall function. Required minimal visual and verbal cues to promote proper body alignment and promote proper body posture. Progressed resistance, range and complexity of movement as indicated. Tx area: cervical spine Date:  12/28/22 Date:  12/29/22   Activity/Exercise Parameters Parameters   UBE 5 min NuStep x 7 min   Pt education Yes; see below    Lift offs on wall 3 x 10  3 x 10    Open books on wall 1 min x 2 bilat    Chin tucks Supine 30x (low austin in sitting) Supine 30x    Pec stretch on doorway  30 sec x 2     Resistance  2 x 15 GTB   Cable Col  22.5 bilat lat pull downs 3 x 10                HEP: chin tucks, pec stretch on doorway, scap squeezes     MANUAL THERAPY: Cervical: STM UT/LS, c-spine PAs, side glides, suboccipital release     Joint mobilization, Soft tissue mobilization and Manipulation was utilized and necessary because of the patient's restricted joint motion, painful spasm, loss of articular motion and restricted motion of soft tissue. MODALITIES:      Education: Educated pt on using thera cane. Pt education for HEP safety, exercise frequency and duration, symptom control, mechanics, fall risk, and anatomy and physiology of present condition/healing time. Treatment/Session Summary:    Treatment Assessment: Patient demonstrating good working knowledge of HEP activities from IE. Communication/Consultation:  None today  Equipment provided today:  None  Recommendations/Intent for next treatment session: Next visit will focus on advancements to more challenging activities.      Total Treatment Billable Duration: 40 minutes   Time In: 0845  Time Out: 1363       Manual Therapy: (16 minutes) Ther-Ex: (14 minutes)     Ther-Act: (10 minutes)     Neuro Re-ed (0 minutes)     Modalities: (0 minutes)    Lopez Yadav, PT, DPT       Charge Capture  }Post Session Pain  PT Visit Info  [employer] Portal  MD Guidelines  Scanned Media  Benefits  MyChart    Future Appointments   Date Time Provider Yosvany Miranda   1/4/2023  1:45 PM Gabriella Beck, PT [facility]   1/10/2023  1:45 PM Gabriella Beck, PT [facility]   1/11/2023 10:00 AM Skip Shires, DO [address] [facility] AMB   1/12/2023  1:45 PM REINA Becerra   2/8/2023  9:00 AM FLORA Perry - [facility] RILEY [facility] AM[facility]

## 2023-01-04 ENCOUNTER — HOSPITAL ENCOUNTER (OUTPATIENT)
Dept: PHYSICAL THERAPY | Age: 68
Setting detail: RECURRING SERIES
Discharge: HOME | End: 2023-01-07
Payer: COMMERCIAL

## 2023-01-04 PROCEDURE — 97110 THERAPEUTIC EXERCISES: CPT

## 2023-01-04 PROCEDURE — 97140 MANUAL THERAPY 1/> REGIONS: CPT

## 2023-01-04 PROCEDURE — 97530 THERAPEUTIC ACTIVITIES: CPT

## 2023-01-04 NOTE — PROGRESS NOTES
Jian Kevon  : 1955  Primary: Nick [place] ([facility])  Secondary:  [address] [address]  Phone: [phone]  Fax: [phone] Plan Frequency: 2x/wk x 8 wks  Plan of Care/Certification Expiration Date: 23      PT Visit Info:  Plan Frequency: 2x/wk x 8 wks  Plan of Care/Certification Expiration Date: 23  Total # of Visits Approved: 60 (including eval)  Total # of Visits to Date: 4  Progress Note Counter: 4     Visit Count:  4   OUTPATIENT PHYSICAL THERAPY:OP NOTE TYPE: Treatment Note 2023       Episode  }Appt Desk             Treatment Diagnosis:  Cervicalgia (M54.2)  Abnormal posture (R29.3)  Medical/Referring Diagnosis:  Neck pain on left side [M54.2]  Referring Provider:  FLORA Anton CNP  Orders:  PT Eval and Treat   Date of Onset:  Onset Date: 10/03/22     Allergies:   Patient has no known allergies. Restrictions/Precautions:    Interventions Planned (Treatment may consist of any combination of the following):    Current Treatment Recommendations: Strengthening; ROM; Balance training; Functional mobility training; Neuromuscular re-education; Manual; Pain management; Home exercise program; Patient/Caregiver education & training; Therapeutic activities; Modalities     REASON FOR TREATMENT: pain in cervical spine due to degenerative changes with a flexion preference. Functional limitations reported at initial Eval: turning his head left, checking blind spot while driving, and prolonged sitting at his desk. REASON FOR TREATMENT: pain in cervical spine due to degenerative changes with a flexion preference. Functional limitations reported at initial Eval: turning his head left, checking blind spot while driving, and prolonged sitting at his desk. Subjective Comments: Pt says he has some soreness in his left upper trap today. He is compliant with his HEP.       Initial:}     4/10              Post Session:        3/10  Medications Last Reviewed:  1/4/2023  Updated Objective Findings:  None Today  Treatment   TREATMENT GRID: Exercises and activities per grid below to improve mobility, strength, , and overall function. Required minimal visual and verbal cues to promote proper body alignment and promote proper body posture. Progressed resistance, range and complexity of movement as indicated. Tx area: cervical spine Date:  12/29/22 Date:  1/4/22   Activity/Exercise Parameters    UBE NuStep x 7 min 6 min   Pt education  Yes; see bellow   Lift offs on wall 3 x 10     Open books on wall  30x bilat   Sidelying open books  30x bilat   Chin tucks Supine 30x     Pec stretch on doorway      Resistance 2 x 15 GTB 2 x 15   bilat ext OTB   Cable Col. 22.5# bilat lat pull downs 3 x 10  2 x 15 ea   lat pull downs 22.5# bilat;   Rows 22.5#;                 HEP: chin tucks, pec stretch on doorway, scap squeezes     MANUAL THERAPY: Cervical: STM UT/LS, c-spine PAs, side glides, suboccipital release     Joint mobilization, Soft tissue mobilization and Manipulation was utilized and necessary because of the patient's restricted joint motion, painful spasm, loss of articular motion and restricted motion of soft tissue. MODALITIES:      Education: Educated pt on using thera cane. Pt education for HEP safety, exercise frequency and duration, symptom control, mechanics, fall risk, and anatomy and physiology of present condition/healing time. Treatment/Session Summary:    Treatment Assessment: Pt requires VC and MC to prevent compensatory movements. Communication/Consultation:  None today  Equipment provided today:  None  Recommendations/Intent for next treatment session: Next visit will focus on advancements to more challenging activities.      Total Treatment Billable Duration: 40 minutes   Time In: 1340  Time Out: 1420       Manual Therapy: (14 minutes)     Ther-Ex: (16 minutes)     Ther-Act: (10 minutes)     Neuro Re-ed (0 minutes)     Modalities: (0 minutes)    Ayana Tomas, PT, DPT       Charge Capture  }Post Session Pain  PT Visit Info  Med[facility] Portal  MD Guidelines  Scanned Media  Benefits  MyChart    Future Appointments   Date Time Provider Yosvany Ruth   1/10/2023  1:45 PM [facility], PT [facility]   1/11/2023 10:00 AM Jennifer Henao DO [address] [facility] AMB   1/12/2023  1:45 PM [facility], PT [facility]   2/8/2023  9:00 AM Mihir Blair APRN - [facility] AM[facility]

## 2023-01-10 ENCOUNTER — HOSPITAL ENCOUNTER (OUTPATIENT)
Dept: PHYSICAL THERAPY | Age: 68
Setting detail: RECURRING SERIES
Discharge: HOME | End: 2023-01-13
Payer: COMMERCIAL

## 2023-01-10 PROCEDURE — 97530 THERAPEUTIC ACTIVITIES: CPT

## 2023-01-10 PROCEDURE — 97110 THERAPEUTIC EXERCISES: CPT

## 2023-01-10 PROCEDURE — 97140 MANUAL THERAPY 1/> REGIONS: CPT

## 2023-01-10 NOTE — PROGRESS NOTES
Sydnie Ramone  : 1955  Primary: Sourav [place] ([facility])  Secondary:  [address] [address]  Phone: [phone]  Fax: [phone] Plan Frequency: 2x/wk x 8 wks  Plan of Care/Certification Expiration Date: 23      PT Visit Info:  Plan Frequency: 2x/wk x 8 wks  Plan of Care/Certification Expiration Date: 23  Total # of Visits Approved: 60 (including eval)  Total # of Visits to Date: 5  Progress Note Counter: 5     Visit Count:  5   OUTPATIENT PHYSICAL THERAPY:OP NOTE TYPE: Treatment Note 1/10/2023       Episode  }Appt Desk             Treatment Diagnosis:  Cervicalgia (M54.2)  Abnormal posture (R29.3)  Medical/Referring Diagnosis:  Neck pain on left side [M54.2]  Referring Provider:  FLORA Aldana CNP  Orders:  PT Eval and Treat   Date of Onset:  Onset Date: 10/03/22     Allergies:   Patient has no known allergies. Restrictions/Precautions:    Interventions Planned (Treatment may consist of any combination of the following):    Current Treatment Recommendations: Strengthening; ROM; Balance training; Functional mobility training; Neuromuscular re-education; Manual; Pain management; Home exercise program; Patient/Caregiver education & training; Therapeutic activities; Modalities     REASON FOR TREATMENT: pain in cervical spine due to degenerative changes with a flexion preference. Functional limitations reported at initial Eval: turning his head left, checking blind spot while driving, and prolonged sitting at his desk. REASON FOR TREATMENT: pain in cervical spine due to degenerative changes with a flexion preference. Functional limitations reported at initial Eval: turning his head left, checking blind spot while driving, and prolonged sitting at his desk. Subjective Comments: Pt says he has some soreness in his left upper trap today. He is compliant with his HEP.      Initial:}     4/10              Post Session:        3/10  Medications Last Reviewed:  1/10/2023  Updated Objective Findings:  None Today  Treatment   TREATMENT GRID: Exercises and activities per grid below to improve mobility, strength, , and overall function. Required minimal visual and verbal cues to promote proper body alignment and promote proper body posture. Progressed resistance, range and complexity of movement as indicated. Tx area: cervical spine Date:  1/4/22 Date:  1/10/22   Activity/Exercise     UBE 6 min 6 min   Pt education Yes; see bellow    Lift offs on wall     Open books on wall 30x bilat    Sidelying open books 30x bilat    Chin tucks  30x   Pec stretch on doorway      Resistance 2 x 15   bilat ext OTB 2 x 15   bilat ext OTB   Cable Col. 2 x 15 ea   lat pull downs 22.5# bilat;   Rows 22.5#;   2 x 15 ea   lat pull downs 22.5# bilat;   Rows 22.5#; Incline UTR  2 x 15 bilat          HEP: chin tucks, pec stretch on doorway, scap squeezes     MANUAL THERAPY: Cervical: STM UT/LS, c-spine PAs, side glides, suboccipital release     Joint mobilization, Soft tissue mobilization and Manipulation was utilized and necessary because of the patient's restricted joint motion, painful spasm, loss of articular motion and restricted motion of soft tissue. MODALITIES:      Education: Educated pt on using thera cane. Pt education for HEP safety, exercise frequency and duration, symptom control, mechanics, fall risk, and anatomy and physiology of present condition/healing time. Treatment/Session Summary:    Treatment Assessment: Pt is making slow but noticeable improvement session to session. Communication/Consultation:  None today  Equipment provided today:  None  Recommendations/Intent for next treatment session: Next visit will focus on advancements to more challenging activities.      Total Treatment Billable Duration: 40 minutes   Time In: 8351  Time Out: 9652       Manual Therapy: (14 minutes)     Ther-Ex: (16 minutes) Ther-Act: (10 minutes)     Neuro Re-ed (0 minutes)     Modalities: (0 minutes)    Ne Hong, PT, DPT       Charge Capture  }Post Session Pain  PT Visit Info  MedBridge Portal  MD Guidelines  Scanned Media  Benefits  MyChart    Future Appointments   Date Time Provider Yosvany Ruth   1/11/2023 10:00 AM Jose Alfredo Cerda DO [address] [facility] AMB   1/12/2023  1:45 PM [facility], PT [facility]   1/17/2023  9:30 AM Ne Hong, PT [facility]   1/19/2023  9:30 AM [facility], PT [facility]   1/25/2023  9:30 AM [facility], PT [facility]   1/27/2023  9:30 AM Ne Hong, PT [facility]   2/1/2023  9:30 AM Ne Hong, PT [facility]   2/3/2023  9:30 AM Ne Hong, PT [facility]   2/8/2023  9:00 AM FLORA Dominguez - [facility] AM[facility]

## 2023-01-11 ENCOUNTER — OFFICE VISIT (OUTPATIENT)
Dept: INTERNAL MEDICINE CLINIC | Facility: CLINIC | Age: 68
End: 2023-01-11
Payer: COMMERCIAL

## 2023-01-11 VITALS
TEMPERATURE: 98.1 F | RESPIRATION RATE: 12 BRPM | DIASTOLIC BLOOD PRESSURE: 72 MMHG | SYSTOLIC BLOOD PRESSURE: 119 MMHG | HEART RATE: 60 BPM | HEIGHT: 70 IN | WEIGHT: 244 LBS | OXYGEN SATURATION: 96 % | BODY MASS INDEX: 34.93 KG/M2

## 2023-01-11 DIAGNOSIS — R73.09 ELEVATED HEMOGLOBIN A1C: ICD-10-CM

## 2023-01-11 DIAGNOSIS — E66.9 OBESITY (BMI 30-39.9): ICD-10-CM

## 2023-01-11 DIAGNOSIS — K76.0 HEPATIC STEATOSIS: Primary | ICD-10-CM

## 2023-01-11 DIAGNOSIS — E78.2 HYPERLIPIDEMIA, MIXED: ICD-10-CM

## 2023-01-11 DIAGNOSIS — S16.1XXA STRAIN OF NECK MUSCLE, INITIAL ENCOUNTER: ICD-10-CM

## 2023-01-11 LAB
EST. AVERAGE GLUCOSE BLD GHB EST-MCNC: 126 MG/DL
HBA1C MFR BLD: 6 % (ref 4.8–5.6)

## 2023-01-11 PROCEDURE — 99214 OFFICE O/P EST MOD 30 MIN: CPT | Performed by: INTERNAL MEDICINE

## 2023-01-11 PROCEDURE — 1123F ACP DISCUSS/DSCN MKR DOCD: CPT | Performed by: INTERNAL MEDICINE

## 2023-01-11 RX ORDER — SIMVASTATIN 20 MG
20 TABLET ORAL NIGHTLY
Qty: 30 TABLET | Refills: 5 | Status: SHIPPED | OUTPATIENT
Start: 2023-01-11

## 2023-01-11 ASSESSMENT — PATIENT HEALTH QUESTIONNAIRE - PHQ9
1. LITTLE INTEREST OR PLEASURE IN DOING THINGS: 0
2. FEELING DOWN, DEPRESSED OR HOPELESS: 0
SUM OF ALL RESPONSES TO PHQ QUESTIONS 1-9: 0
SUM OF ALL RESPONSES TO PHQ9 QUESTIONS 1 & 2: 0
SUM OF ALL RESPONSES TO PHQ QUESTIONS 1-9: 0

## 2023-01-11 ASSESSMENT — ENCOUNTER SYMPTOMS
RESPIRATORY NEGATIVE: 1
EYES NEGATIVE: 1
BACK PAIN: 1
GASTROINTESTINAL NEGATIVE: 1

## 2023-01-11 ASSESSMENT — ANXIETY QUESTIONNAIRES
3. WORRYING TOO MUCH ABOUT DIFFERENT THINGS: 0
1. FEELING NERVOUS, ANXIOUS, OR ON EDGE: 0
2. NOT BEING ABLE TO STOP OR CONTROL WORRYING: 0
6. BECOMING EASILY ANNOYED OR IRRITABLE: 0
IF YOU CHECKED OFF ANY PROBLEMS ON THIS QUESTIONNAIRE, HOW DIFFICULT HAVE THESE PROBLEMS MADE IT FOR YOU TO DO YOUR WORK, TAKE CARE OF THINGS AT HOME, OR GET ALONG WITH OTHER PEOPLE: NOT DIFFICULT AT ALL
GAD7 TOTAL SCORE: 0
7. FEELING AFRAID AS IF SOMETHING AWFUL MIGHT HAPPEN: 0
4. TROUBLE RELAXING: 0
5. BEING SO RESTLESS THAT IT IS HARD TO SIT STILL: 0

## 2023-01-11 NOTE — PROGRESS NOTES
Tiffany Alston D.O. [facility]  [address], [address] [facility] [address]  Tel: [phone]    Office Visit: Follow Up     Patient Name: Aletha Izquierdo   :  1955   MRN:   [id number]      Today's Date: 23 9:53 AM    Subjective     The patient is a 69y.o.-year-old male who presents for follow-up. Today:   He states he saw a spine doctor who diagnosed him with severe degenerative disc disease in his cervical spine. He has also been doing physical therapy which he states helps a lot. He states he has been trying to eat healthier and eating healthier has been going to [employer]. Review of Systems   Constitutional: Negative. HENT: Negative. Eyes: Negative. Respiratory: Negative. Gastrointestinal: Negative. Genitourinary: Negative. Musculoskeletal:  Positive for arthralgias, back pain and neck pain. Skin: Negative. Neurological: Negative. Psychiatric/Behavioral: Negative. Past Medical History:   Diagnosis Date    Anxiety and depression     hx-- no meds in yrs per pt    Diverticulitis     with perforation    Diverticulitis of colon with perforation 2019    Dyslipidemia     Family history of prostate cancer in father 2019    GERD (gastroesophageal reflux disease)     HX-- not a problem in several yrs-- no meds    Hepatic steatosis 2019    Hyperlipidemia, mixed 2015    Obesity (BMI 30-39. 9) 2022     Social History     Socioeconomic History    Marital status:      Spouse name: Not on file    Number of children: Not on file    Years of education: Not on file    Highest education level: Not on file   Occupational History    Not on file   Tobacco Use    Smoking status: Never    Smokeless tobacco: Never   Substance and Sexual Activity    Alcohol use: Yes    Drug use: No    Sexual activity: Not on file   Other Topics Concern    Not on file   Social History Narrative    Not on file     Social Determinants of Health     Financial Resource Strain: Not on file   Food Insecurity: Not on file   Transportation Needs: Not on file   Physical Activity: Not on file   Stress: Not on file   Social Connections: Not on file   Intimate Partner Violence: Not on file   Housing Stability: Not on file         Current Outpatient Medications   Medication Sig    diclofenac sodium (VOLTAREN) 1 % GEL Apply 2 g topically 4 times daily    simvastatin (ZOCOR) 20 MG tablet Take 1 tablet by mouth nightly    aspirin 81 MG EC tablet Take 81 mg by mouth daily     No current facility-administered medications for this visit. Objective     Vitals:    01/11/23 0948   BP: 119/72   Site: Left Upper Arm   Position: Sitting   Cuff Size: Large Adult   Pulse: 60   Resp: 12   Temp: 98.1 °F (36.7 °C)   TempSrc: Temporal   SpO2: 96%   Weight: 244 lb (110.7 kg)   Height: 5' 10\" (1.778 m)      Physical Exam:  Constitutional: Appears well kempt. Alert/oriented x3. In no acute distress. Head: Normocephalic No trauma. No deformity. Cardiac: Heart with normal rate/rhythm. No murmurs. No gallops. Pulses normal.   Cervical spine: Good range of motion, nontender to palpation  Pulmonary: Lungs clear to auscultation bilaterally. In no respiratory distress. No wheezing. No rales. No rhonci. Psychiatric: Normal thought content. Normal behavior. Normal judgment. Recommendations     Assessment:  Patient Active Problem List   Diagnosis    Hyperlipidemia, mixed    Hepatic steatosis    Obesity (BMI 30-39. 9)    Elevated hemoglobin A1c    Neck pain    Cervical strain      Status of above conditions: stable     Plan:  -check hemoglobin A1c   -continue PT as needed  -Discussed with the patient the importance of sticking to a well-balanced healthy diet and continue with exercise.   I advised patient not to overdo it at the gym, as he states that sometimes if he misses a day he will go back and try to do extra at his next session.    -Previous medical records and/or labs/tests available to me reviewed, any records outstanding not available requested  -The risks, benefits, and medical necessity of all medications, tests labs, and any other orders that were ordered at today's visit were discussed with the patient including all elective or patient requested orders. Decision to order or not order tests or based on this risk/benefit ratio and medical necessity.  -The patient expresses understanding of the plan as I've explained it to him and is in agreement with the current plan.     Follow Up: 6 months    Total Time: 30 minutes (chart reviewing and in-exam time)    Signed: Victoria Ryder D.O.  01/11/23  9:53 AM

## 2023-01-12 ENCOUNTER — HOSPITAL ENCOUNTER (OUTPATIENT)
Dept: PHYSICAL THERAPY | Age: 68
Setting detail: RECURRING SERIES
Discharge: HOME | End: 2023-01-15
Payer: COMMERCIAL

## 2023-01-12 PROCEDURE — 97530 THERAPEUTIC ACTIVITIES: CPT

## 2023-01-12 PROCEDURE — 97140 MANUAL THERAPY 1/> REGIONS: CPT

## 2023-01-12 PROCEDURE — 97110 THERAPEUTIC EXERCISES: CPT

## 2023-01-12 NOTE — PROGRESS NOTES
Jason King  : 1955  Primary: [place] ([employer])  Secondary:  [address] @ [address] [address]  Phone: [phone]  Fax: [phone] Plan Frequency: 2x/wk x 8 wks  Plan of Care/Certification Expiration Date: 23      PT Visit Info:  Plan Frequency: 2x/wk x 8 wks  Plan of Care/Certification Expiration Date: 23  Total # of Visits Approved: 60 (including eval)  Total # of Visits to Date: 6  Progress Note Counter: 6     Visit Count:  6   OUTPATIENT PHYSICAL THERAPY:OP NOTE TYPE: Treatment Note 2023       Episode  }Appt Desk             Treatment Diagnosis:  Cervicalgia (M54.2)  Abnormal posture (R29.3)  Medical/Referring Diagnosis:  Neck pain on left side [M54.2]  Referring Provider:  FLORA Travis CNP  Orders:  PT Eval and Treat   Date of Onset:  Onset Date: 10/03/22     Allergies:   Patient has no known allergies. Restrictions/Precautions:    Interventions Planned (Treatment may consist of any combination of the following):    Current Treatment Recommendations: Strengthening; ROM; Balance training; Functional mobility training; Neuromuscular re-education; Manual; Pain management; Home exercise program; Patient/Caregiver education & training; Therapeutic activities; Modalities     REASON FOR TREATMENT: pain in cervical spine due to degenerative changes with a flexion preference. Functional limitations reported at initial Eval: turning his head left, checking blind spot while driving, and prolonged sitting at his desk. REASON FOR TREATMENT: pain in cervical spine due to degenerative changes with a flexion preference. Functional limitations reported at initial Eval: turning his head left, checking blind spot while driving, and prolonged sitting at his desk. Subjective Comments: Pt says the soreness in his left upper trap is better today. He is compliant with his HEP.      Initial:}     4/10 Post Session:        3/10  Medications Last Reviewed:  1/12/2023  Updated Objective Findings:  None Today  Treatment   TREATMENT GRID: Exercises and activities per grid below to improve mobility, strength, , and overall function. Required minimal visual and verbal cues to promote proper body alignment and promote proper body posture. Progressed resistance, range and complexity of movement as indicated. Tx area: cervical spine Date:  1/10/23 Date:  1/12/23   Activity/Exercise     UBE 6 min 6 min   Pt education     Lift offs on wall     Open books on wall     Sidelying open books     Chin tucks 30x 30x   Pec stretch on doorway      Resistance 2 x 15   bilat ext OTB    Cable Col. 2 x 15 ea   lat pull downs 22.5# bilat;   Rows 22.5#; 2 x 15 ea   lat pull downs 22.5# bilat;   Rows 22.5#; Incline UTR 2 x 15 bilat 2 x 15 bilat   Prone Cervical ROM  3-way 2 x 15 ea     HEP: chin tucks, pec stretch on doorway, scap squeezes     MANUAL THERAPY: Cervical: STM UT/LS, c-spine PAs, side glides, suboccipital release     Joint mobilization, Soft tissue mobilization and Manipulation was utilized and necessary because of the patient's restricted joint motion, painful spasm, loss of articular motion and restricted motion of soft tissue. MODALITIES:      Education: Educated pt on using thera cane. Pt education for HEP safety, exercise frequency and duration, symptom control, mechanics, fall risk, and anatomy and physiology of present condition/healing time. Treatment/Session Summary:    Treatment Assessment: Pt is making slow but noticeable improvement session to session. Communication/Consultation:  None today  Equipment provided today:  None  Recommendations/Intent for next treatment session: Next visit will focus on advancements to more challenging activities.      Total Treatment Billable Duration: 40 minutes   Time In: 2753  Time Out: 1427       Manual Therapy: (14 minutes)     Ther-Ex: (18 minutes)     Ther-Act: (8 minutes)     Neuro Re-ed (0 minutes)     Modalities: (0 minutes)    Zeb Rico, PT, DPT       Charge Capture  }Post Session Pain  PT Visit Info  [employer] Portal  MD Guidelines  Scanned Media  Benefits  MyChart    Future Appointments   Date Time Provider Yosvany Ruth   1/17/2023  9:30 AM Syed Beck, PT [facility]   1/19/2023  9:30 AM Syed Beck, PT [facility]   1/25/2023  9:30 AM Syed Beck, PT [facility]   1/27/2023  9:30 AM Zeb Rico, PT [facility]   2/1/2023  9:30 AM Zeb Rico, PT [facility]   2/3/2023  9:30 AM Zeb Rico, PT [facility]   2/8/2023  9:00 AM FLORA Oliver - CNP [facility]   7/11/2023  8:00 AM Aline Stauffer, DO [address] [facility]

## 2023-01-17 ENCOUNTER — HOSPITAL ENCOUNTER (OUTPATIENT)
Dept: PHYSICAL THERAPY | Age: 68
Setting detail: RECURRING SERIES
Discharge: HOME | End: 2023-01-20
Payer: COMMERCIAL

## 2023-01-17 PROCEDURE — 97530 THERAPEUTIC ACTIVITIES: CPT

## 2023-01-17 PROCEDURE — 97110 THERAPEUTIC EXERCISES: CPT

## 2023-01-17 PROCEDURE — 97140 MANUAL THERAPY 1/> REGIONS: CPT

## 2023-01-17 NOTE — PROGRESS NOTES
Karol Nieves  : 1955  Primary: Kylah Taveras Sc ([facility])  Secondary:  [address] @ [address] [address]  Phone: [phone]  Fax: [phone] Plan Frequency: 2x/wk x 8 wks  Plan of Care/Certification Expiration Date: 23      PT Visit Info:  Plan Frequency: 2x/wk x 8 wks  Plan of Care/Certification Expiration Date: 23  Total # of Visits Approved: 60 (including eval)  Total # of Visits to Date: 7  Progress Note Counter: 7     Visit Count:  7   OUTPATIENT PHYSICAL THERAPY:OP NOTE TYPE: Treatment Note 2023       Episode  }Appt Desk             Treatment Diagnosis:  Cervicalgia (M54.2)  Abnormal posture (R29.3)  Medical/Referring Diagnosis:  Neck pain on left side [M54.2]  Referring Provider:  FLORA Victoria CNP  Orders:  PT Eval and Treat   Date of Onset:  Onset Date: 10/03/22     Allergies:   Patient has no known allergies. Restrictions/Precautions:    Interventions Planned (Treatment may consist of any combination of the following):    Current Treatment Recommendations: Strengthening; ROM; Balance training; Functional mobility training; Neuromuscular re-education; Manual; Pain management; Home exercise program; Patient/Caregiver education & training; Therapeutic activities; Modalities     REASON FOR TREATMENT: pain in cervical spine due to degenerative changes with a flexion preference. Functional limitations reported at initial Eval: turning his head left, checking blind spot while driving, and prolonged sitting at his desk. REASON FOR TREATMENT: pain in cervical spine due to degenerative changes with a flexion preference. Functional limitations reported at initial Eval: turning his head left, checking blind spot while driving, and prolonged sitting at his desk. Subjective Comments: Pt says the soreness in his left upper trap is better today. He is compliant with his HEP.      Initial:}     4/10 Post Session:        3/10  Medications Last Reviewed:  1/17/2023  Updated Objective Findings:  None Today  Treatment   TREATMENT GRID: Exercises and activities per grid below to improve mobility, strength, , and overall function. Required minimal visual and verbal cues to promote proper body alignment and promote proper body posture. Progressed resistance, range and complexity of movement as indicated. Tx area: cervical spine Date:  1/12/23 Date:  1/12/23   Activity/Exercise     UBE 6 min 6 min   Pt education     Lift offs on wall     Open books on wall     Sidelying open books     Chin tucks 30x 30x with 3s lift   Pec stretch on doorway      Resistance     Cable Col. 2 x 15 ea   lat pull downs 22.5# bilat;   Rows 22.5#; 2 x 15 ea  Alt HA 3 x 10    Incline UTR 2 x 15 bilat    Prone Cervical ROM 3-way 2 x 15 ea    Prone bilat flxn  3 x 10 0#   Quadruped UTR  2 x 10 bilat      HEP: chin tucks, pec stretch on doorway, scap squeezes     MANUAL THERAPY: Cervical: STM UT/LS, c-spine PAs, side glides, suboccipital release     Joint mobilization, Soft tissue mobilization and Manipulation was utilized and necessary because of the patient's restricted joint motion, painful spasm, loss of articular motion and restricted motion of soft tissue. MODALITIES:      Education: Educated pt on using thera cane. Pt education for HEP safety, exercise frequency and duration, symptom control, mechanics, fall risk, and anatomy and physiology of present condition/healing time. Treatment/Session Summary:    Treatment Assessment: Pt had a good response to session today with progression of scapular and postural strengthening and ROM activities. Communication/Consultation:  None today  Equipment provided today:  None  Recommendations/Intent for next treatment session: Next visit will focus on advancements to more challenging activities.      Total Treatment Billable Duration: 40 minutes   Time In: 0930  Time Out: 1010       Manual Therapy: (14 minutes)     Ther-Ex: (18 minutes)     Ther-Act: (8 minutes)     Neuro Re-ed (0 minutes)     Modalities: (0 minutes)    Estephania Barrera, PT, DPT       Charge Capture  }Post Session Pain  PT Visit Info  [employer] Portal  MD Guidelines  Scanned Media  Benefits  MyChart    Future Appointments   Date Time Provider Yosvany Ruth   1/19/2023  9:30 AM Jeff Beck, PT [facility]   1/25/2023  9:30 AM Jeff Beck, PT [facility]   1/27/2023  9:30 AM Jeff Beck, PT [facility]   2/1/2023  9:30 AM Jeff Beck, PT [facility]   2/3/2023  9:30 AM Jeff Beck, PT [facility]   2/8/2023  9:00 AM FLORA Campbell - CNP [facility]   7/11/2023  8:00 AM Aline Stauffer,  [facility] AM[facility]

## 2023-01-19 ENCOUNTER — HOSPITAL ENCOUNTER (OUTPATIENT)
Dept: PHYSICAL THERAPY | Age: 68
Setting detail: RECURRING SERIES
Discharge: HOME | End: 2023-01-22
Payer: COMMERCIAL

## 2023-01-19 PROCEDURE — 97530 THERAPEUTIC ACTIVITIES: CPT

## 2023-01-19 PROCEDURE — 97110 THERAPEUTIC EXERCISES: CPT

## 2023-01-19 PROCEDURE — 97140 MANUAL THERAPY 1/> REGIONS: CPT

## 2023-01-19 NOTE — PROGRESS NOTES
Wendi Sue  : 1955  Primary: Panchito Francesvamshi [place] ([facility] [employer])  Secondary:  Penelope Doe @ [address] [address]  Phone: [phone]  Fax: [phone] Plan Frequency: 2x/wk x 8 wks  Plan of Care/Certification Expiration Date: 23      PT Visit Info:  Plan Frequency: 2x/wk x 8 wks  Plan of Care/Certification Expiration Date: 23  Total # of Visits Approved: 60 (including eval)  Total # of Visits to Date: 8  Progress Note Counter: 8     Visit Count:  8   OUTPATIENT PHYSICAL THERAPY:OP NOTE TYPE: Treatment Note 2023       Episode  }Appt Desk             Treatment Diagnosis:  Cervicalgia (M54.2)  Abnormal posture (R29.3)  Medical/Referring Diagnosis:  Neck pain on left side [M54.2]  Referring Provider:  FLORA Mejia CNP  Orders:  PT Eval and Treat   Date of Onset:  Onset Date: 10/03/22     Allergies:   Patient has no known allergies. Restrictions/Precautions:    Interventions Planned (Treatment may consist of any combination of the following):    Current Treatment Recommendations: Strengthening; ROM; Balance training; Functional mobility training; Neuromuscular re-education; Manual; Pain management; Home exercise program; Patient/Caregiver education & training; Therapeutic activities; Modalities     REASON FOR TREATMENT: pain in cervical spine due to degenerative changes with a flexion preference. Functional limitations reported at initial Eval: turning his head left, checking blind spot while driving, and prolonged sitting at his desk. REASON FOR TREATMENT: pain in cervical spine due to degenerative changes with a flexion preference. Functional limitations reported at initial Eval: turning his head left, checking blind spot while driving, and prolonged sitting at his desk. Subjective Comments: Pt says his symptoms are about the today. He is compliant with his HEP.      Initial:}     4/10              Post Session: 3/10  Medications Last Reviewed:  1/19/2023  Updated Objective Findings:  None Today  Treatment   TREATMENT GRID: Exercises and activities per grid below to improve mobility, strength, , and overall function. Required minimal visual and verbal cues to promote proper body alignment and promote proper body posture. Progressed resistance, range and complexity of movement as indicated. Tx area: cervical spine to L UT Date:  1/12/23 Date:  1/19/23   Activity/Exercise     UBE 6 min 6 min   Pt education  yes   Lift offs on wall     Open books on wall     Sidelying open books     Chin tucks 30x with 3s lift 30x    Pec stretch on doorway   2 x 30s ea   Resistance     Cable Col. 2 x 15 ea  Alt HA 3 x 10     Incline UTR     Prone Cervical ROM     Prone bilat flxn 3 x 10 0#    Quadruped UTR 2 x 10 bilat  2 x 10 bilat   LS on doorway  30s x 2 ea          HEP: chin tucks, pec stretch on doorway, scap squeezes     MANUAL THERAPY: Cervical: STM UT/LS, c-spine PAs, side glides, suboccipital release     Joint mobilization, Soft tissue mobilization and Manipulation was utilized and necessary because of the patient's restricted joint motion, painful spasm, loss of articular motion and restricted motion of soft tissue. Education: Educated pt on using thera cane. Pt education for HEP safety, exercise frequency and duration, symptom control, mechanics, fall risk, and anatomy and physiology of present condition/healing time. Treatment/Session Summary:    Treatment Assessment: Pt requires VC and MC to prevent compensatory movements. Communication/Consultation:  None today  Equipment provided today:  None  Recommendations/Intent for next treatment session: Next visit will focus on advancements to more challenging activities.      Total Treatment Billable Duration: 40 minutes   Time In: 0930  Time Out: 1010       Manual Therapy: (14 minutes)     Ther-Ex: (18 minutes)     Ther-Act: (8 minutes)     Neuro Re-ed (0 minutes) Modalities: (0 minutes)    Do Cortes, PT, DPT       Charge Capture  }Post Session Pain  PT Visit Info  [facility] Portal  MD Guidelines  Scanned Media  Benefits  MyChart    Future Appointments   Date Time Provider Yosvany Miranda   1/25/2023  9:30 AM Rosalino Beck, PT [facility]   1/27/2023  9:30 AM Rosalino Beck, PT [facility]   2/1/2023  9:30 AM Do Cortes PT [facility]   2/3/2023  9:30 AM Do Cortes, PT [facility]   2/8/2023  9:00 AM FLORA Paredes - CNP [facility]   7/11/2023  8:00 AM Mariposa Stauffer, DO [address] [facility]

## 2023-01-25 ENCOUNTER — APPOINTMENT (OUTPATIENT)
Dept: PHYSICAL THERAPY | Age: 68
End: 2023-01-25
Payer: COMMERCIAL

## 2023-01-27 ENCOUNTER — HOSPITAL ENCOUNTER (OUTPATIENT)
Dept: PHYSICAL THERAPY | Age: 68
Setting detail: RECURRING SERIES
Discharge: HOME | End: 2023-01-30
Payer: COMMERCIAL

## 2023-01-27 PROCEDURE — 97110 THERAPEUTIC EXERCISES: CPT

## 2023-01-27 PROCEDURE — 97140 MANUAL THERAPY 1/> REGIONS: CPT

## 2023-01-27 PROCEDURE — 97530 THERAPEUTIC ACTIVITIES: CPT

## 2023-01-27 NOTE — PROGRESS NOTES
Imani Mayer  : 1955  [facility]: [facility]ong [place] [facility]  Secondary:  [address] @ [address] [address]  Phone: [phone]  Fax: [phone] Plan Frequency: 2x/wk x 8 wks  Plan of Care/Certification Expiration Date: 23      PT Visit Info:  Plan Frequency: 2x/wk x 8 wks  Plan of Care/Certification Expiration Date: 23  Total # of Visits Approved: 60 (including eval)  Total # of Visits to Date: 9  Progress Note Counter: 9     Visit Count:  9   OUTPATIENT PHYSICAL THERAPY:OP NOTE TYPE: Treatment Note 2023       Episode  }Appt Desk             Treatment Diagnosis:  Cervicalgia (M54.2)  Abnormal posture (R29.3)  Medical/Referring Diagnosis:  Neck pain on left side [M54.2]  Referring Provider:  FLORA Crook CNP  Orders:  PT Eval and Treat   Date of Onset:  Onset Date: 10/03/22     Allergies:   Patient has no known allergies. Restrictions/Precautions:    Interventions Planned (Treatment may consist of any combination of the following):    Current Treatment Recommendations: Strengthening; ROM; Balance training; Functional mobility training; Neuromuscular re-education; Manual; Pain management; Home exercise program; Patient/Caregiver education & training; Therapeutic activities; Modalities     REASON FOR TREATMENT: pain in cervical spine due to degenerative changes with a flexion preference. Functional limitations reported at initial Eval: turning his head left, checking blind spot while driving, and prolonged sitting at his desk. REASON FOR TREATMENT: pain in cervical spine due to degenerative changes with a flexion preference. Functional limitations reported at initial Eval: turning his head left, checking blind spot while driving, and prolonged sitting at his desk. Subjective Comments: Pt says his symptoms are about the same today with stiffness in LUT. He is compliant with his HEP.      Initial:}     4/10      Post Session:        3/10  Medications Last Reviewed:  1/27/2023  Updated Objective Findings:  None Today  Treatment   TREATMENT GRID: Exercises and activities per grid below to improve mobility, strength, , and overall function. Required minimal visual and verbal cues to promote proper body alignment and promote proper body posture. Progressed resistance, range and complexity of movement as indicated.    Tx area: cervical spine to L UT 1/19/23 1/27/23   Activity/Exercise     UBE 6 min 6 min fwd   Pt education yes yes   Lift offs on wall     Open books on wall     Sidelying open books     Chin tucks 30x     Pec stretch on doorway  2 x 30s ea    Resistance  2 x 10 ea  Seated abd with OTB;  Alt HA GTB   Cable Col.     Incline UTR     Prone Cervical ROM     Prone bilat flxn     Quadruped UTR 2 x 10 bilat 3 x 5 bilat   LS on doorway 30s x 2 ea 30s x 2 ea   Wall angels  2 x 10   Kneeling thoracic stretch  20x     HEP: chin tucks, pec stretch on doorway, scap squeezes     MANUAL THERAPY: Cervical: STM UT/LS, c-spine PAs, side glides, manual cervical traction, and suboccipital release     Joint mobilization, Soft tissue mobilization and Manipulation was utilized and necessary because of the patient's restricted joint motion, painful spasm, loss of articular motion and restricted motion of soft tissue.     Education: Educated pt on sleeping positions and side sleeper pillows. Educated pt on using thera cane. Pt education for HEP safety, exercise frequency and duration, symptom control, mechanics, fall risk, and anatomy and physiology of present condition/healing time.     Treatment/Session Summary:    Treatment Assessment: Pt is making slow but noticeable improvement session to session.      Communication/Consultation:  None today  Equipment provided today:  None  Recommendations/Intent for next treatment session: Next visit will focus on advancements to more challenging activities.     Total Treatment Billable  Duration: 42 minutes   Time In: 0927       Manual Therapy: (14 minutes)     Ther-Ex: (18 minutes)     Ther-Act: (10 minutes)     Neuro Re-ed (0 minutes)     Modalities: (0 minutes)    Tiago Charles, PT, DPT       Charge Capture  }Post Session Pain  PT Visit Info  [employer] Portal  MD Guidelines  Scanned Media  Benefits  MyChart    Future Appointments   Date Time Provider Yosvany Miranda   2/1/2023  9:30 AM [facility], PT [facility]   2/3/2023  9:30 AM [facility]   2/8/2023  9:00 AM FLORA Barrera CNP [facility]   7/11/2023  8:00 AM Tammy Stauffer DO [facility] AM[facility]

## 2023-02-01 ENCOUNTER — HOSPITAL ENCOUNTER (OUTPATIENT)
Dept: PHYSICAL THERAPY | Age: 68
Setting detail: RECURRING SERIES
Discharge: HOME | End: 2023-02-04
Payer: COMMERCIAL

## 2023-02-01 PROCEDURE — 97164 PT RE-EVAL EST PLAN CARE: CPT

## 2023-02-01 PROCEDURE — 97530 THERAPEUTIC ACTIVITIES: CPT

## 2023-02-01 PROCEDURE — 97140 MANUAL THERAPY 1/> REGIONS: CPT

## 2023-02-01 PROCEDURE — 97110 THERAPEUTIC EXERCISES: CPT

## 2023-02-01 NOTE — THERAPY EVALUATION
Jose Roy  : 1955  Primary: Cherry [place] (Destiny COLLAZO)  Secondary:  [facility] @ [place] [address]  [place] [address]  Phone: [phone]  Fax: [phone] Plan Frequency: 2x/wk x 8 wks  Plan of Care/Certification Expiration Date: 23      PT Visit Info:    Plan Frequency: 2x/wk x 8 wks  Plan of Care/Certification Expiration Date: 23  Total # of Visits Approved: 60 (including eval)  Total # of Visits to Date: 10  Progress Note Counter: 1      Visit Count:  10                OUTPATIENT PHYSICAL THERAPY:             OP NOTE TYPE: Progress Report 2023               Episode  Appt Desk         Treatment Diagnosis:  Cervicalgia (M54.2)  Abnormal posture (R29.3)  Medical/Referring Diagnosis:  Neck pain on left side [M54.2]  Referring Provider:  Ruby Spence APRN - CNP  Orders:  PT Eval and Treat  Return Appt:    Date of Onset:  Onset Date: 10/03/22      Allergies:  Patient has no known allergies.  Restrictions/Precautions:       Medications Last Reviewed:  2023     SUBJECTIVE   History of Injury/Illness (Reason for Referral):    Pt is a 66 yo right handed male referred to physical therapy due to left sided neck pain with insidious onset 2 months ago.    Pain/Symptom Location: left upper traps; occasional tingling in L UE C5 and C6 distributions ending at his forearm            Aggravating Factors/ Functional limitations: turning his head left, checking blind spot while driving, and prolonged sitting at his desk.            Alleviating Factors/Positions/Motions: sitting straight up and not moving    Falls: none    Diagnostic Imaging:      FINDINGS: There is loss of disc space at multiple levels with anterior   osteophyte formation multiple levels in the cervical spine. There is no   prevertebral soft tissue edema. There is bony encroachment of the right neural   foramen at C5-6 and of the left neural foramen at C5-6. The lung apices are  clear. Patient Stated Goal(s):  Pt is hoping to be able to function without limitation due to pain or recurrence of neck pain. Initial:      5/10           Post Session:      5/10  Past Medical History/Comorbidities:   Mr. Koffi Lawler  has a past medical history of Anxiety and depression, Diverticulitis, Diverticulitis of colon with perforation, Dyslipidemia, Family history of prostate cancer in father, GERD (gastroesophageal reflux disease), Hepatic steatosis, Hyperlipidemia, mixed, and Obesity (BMI 30-39.9). Mr. Koffi Lawler  has a past surgical history that includes Refractive surgery (30 yrs ago); Colonoscopy (2018); Colonoscopy (2006); Tonsillectomy; Knee arthroscopy (Left, 1980's); vascular surgery (02/2019); pr unlisted procedure abdomen peritoneum & omentum (03/2019); other surgical history; and Upper gastrointestinal endoscopy. Social History/Living Environment:      Prior Level of Function/Work/Activity: commercial contractor     Learning:   Does the patient/guardian have any barriers to learning?: No barriers  What is the preferred language of the patient/guardian?: English  How does the patient/guardian prefer to learn new concepts?: Listening; Reading; Demonstration; Pictures/Videos     Fall Risk Scale:    Kumar Total Score: 15  Kumar Fall Risk: Low (0-24)     OBJECTIVE   Observation        Posture: forward head/shoulders, dec lumbar lordosis, scoliosis with R UT SB        ________________________________________________________________________________________________  Range of Motion     Cervical    AROM (degrees)   Flexion 54   Extension 45 (inc pain in L UT)  2/1/23: 52   Right side bending  30 (inc pain in L UT)  2/1/23: 34   Left side bending 40  2/1/23: 40   Right rotation 54  2/1/23: 64   Left rotation 56 (inc tightness in L UT)  2/1/23: 58     ________________________________________________________________________________________________  Strength         Upper Extremity    Joint:      LEFT  RIGHT  dynamometry Level 2 95 lbs 105 lbs   Pinch  dynamometry     ________________________________________________________________________________________________  Neruo-Vascular        Sensation: unremarkable        NEUROLOGICAL SCREEN: Pt denies diplopia, dysarthria, dysphagia, drop attacks, dizziness, nausea, numbness and no visible resting nystagmus. -REFLEXES Date:     Right Left   C5  (Biceps) 2+ 2+   C6  (Biceps or Brachioradialis) 2+ 2+   C7  (Triceps)     Patellar reflex       Physical Performance Tests:  Scapular Endurance Test: dnt  Neck Flexor Endurance test: dnt    Special Tests  Spurling's: positive bilat    ASSESSMENT     PROGRESS NOTE SUMMARY 2/1/23: A 10th visit re-eval was done to assess progression and further need for PT. Pt states he feels he has improved about 75% since beginning physical therapy. Turning his head left, sitting at his desk, and sleeping have gotten easier. He has some periods where he has no pain. End range left rotation is still difficult when his neck is hurting. Pt has had some improvement in ROM and strength and states function and pain intensity are improving also. Skilled intervention is required to address the listed impairments and functional limitations to meet the patient's set goals. Initial Assessment:           Patient presents with signs and symptoms that are consistent with: pain in cervical spine due to degenerative changes with a flexion preference. The functional deficits are as follows: turning his head left, checking blind spot while driving, and prolonged sitting at his desk. The current impairments include: Decreased strength, dec ROM, muscle coordination, pain, and abnormal posture. Recommending skilled PT: manual therapeutic techniques (as appropriate), therapeutic exercises and activities, balance interventions, and a comprehensive home exercise program to address the current impairments, as listed above.  Pt will benefit from skilled PT (medically necessary) to address above deficits affecting participation in basic ADLs and overall functional tolerance. Problem List: (Impacting functional limitations): Body Structures, Functions, Activity Limitations Requiring Skilled Therapeutic Intervention: Decreased functional mobility ; Decreased ADL status; Decreased body mechanics; Decreased ROM; Decreased safe awareness; Decreased tolerance to work activity; Increased pain; Decreased posture; Decreased coordination     Therapy Prognosis:   Therapy Prognosis: Good     Initial Assessment Complexity: Decision Making: Medium Complexity    PLAN   Effective Dates: 2/20/22 TO Plan of Care/Certification Expiration Date: 03/20/23     Frequency/Duration: Plan Frequency: 2x/wk x 8 wks     Interventions Planned (Treatment may consist of any combination of the following):    Current Treatment Recommendations: Strengthening; ROM; Balance training; Functional mobility training; Neuromuscular re-education; Manual; Pain management; Home exercise program; Patient/Caregiver education & training; Therapeutic activities; Modalities     Goals: (Goals have been discussed and agreed upon with patient.)  Short Term Goals: Time Frame: 4 weeks  Pt will be compliant with initial HEP. (2/1/23 MET)  Pt will decrease worst pain to 4/10 with functional activities. (2/1/23 PROGRESSING)    Discharge Goals: Time Frame: 12 weeks  Pt will decrease worst pain to 1/10 with all functional activities. (2/1/23 PROGRESSING)  Pt will improve left rotation by 25% or better in order safely check his blind spot while driving. (3/8/61 PROGRESSING)  Pt will have no c/o radicular symptoms. (2/1/23 PROGRESSING)  Pt will be able to work without limitation or exacerbation of symptoms. (2/1/23 PROGRESSING)  Pt will be able to perform household chores without limitation. (2/1/23 PROGRESSING)           Outcome Measure:    Tool Used: Neck Disability Index (NDI)  Score: Initial: 2/50  Most Recent: 3/50 (Date: 2/1/23)   Interpretation of Score: The Neck Disability Index is a revised form of the Oswestry Low Back Pain Index and is designed to measure the activities of daily living in person's with neck pain. Each section is scored on a 0-5 scale, 5 representing the greatest disability. The scores of each section are added together for a total score of 50. Medical Necessity:   Skilled intervention continues to be required due to decreased strength, ROM, balance, and functional mobility. Reason for Services/Other Comments:  Patient continues to require skilled intervention due to decreased strength, balance, and ROM with increased pain affecting pt functional mobility. Total Duration:  Time In: 0930  Time Out: 1011    Regarding Jose Roy's therapy, I certify that the treatment plan above will be carried out by a therapist or under their direction.   Thank you for this referral,  Judith Avilez, PT, DPT     Referring Provider Signature: FLORA Zepeda* _______________________________ Date _____________        Post Session Pain  Charge Capture  PT Visit Info MD Guidelines  MyChart

## 2023-02-01 NOTE — PROGRESS NOTES
Joseph Barrera  : 1955  Primary: Suzy Buerger [place] ([facility])  Secondary:  Frank Tapia @ [address] [place] [address] Way [address]  Phone: [phone]  Fax: [phone] Plan Frequency: 2x/wk x 8 wks  Plan of Care/Certification Expiration Date: 23      PT Visit Info:  Plan Frequency: 2x/wk x 8 wks  Plan of Care/Certification Expiration Date: 23  Total # of Visits Approved: 60 (including eval)  Total # of Visits to Date: 10  Progress Note Counter: 1     Visit Count:  10   OUTPATIENT PHYSICAL THERAPY:OP NOTE TYPE: Treatment Note 2023       Episode  }Appt Desk             Treatment Diagnosis:  Cervicalgia (M54.2)  Abnormal posture (R29.3)  Medical/Referring Diagnosis:  Neck pain on left side [M54.2]  Referring Provider:  FLORA Mera CNP  Orders:  PT Eval and Treat   Date of Onset:  Onset Date: 10/03/22     Allergies:   Patient has no known allergies. Restrictions/Precautions:    Interventions Planned (Treatment may consist of any combination of the following):    Current Treatment Recommendations: Strengthening; ROM; Balance training; Functional mobility training; Neuromuscular re-education; Manual; Pain management; Home exercise program; Patient/Caregiver education & training; Therapeutic activities; Modalities     REASON FOR TREATMENT: pain in cervical spine due to degenerative changes with a flexion preference. Functional limitations reported at initial Eval: turning his head left, checking blind spot while driving, and prolonged sitting at his desk. Subjective Comments: Pt states he feels he has improved about 75% since beginning physical therapy. Turning his head left, sitting at his desk, and sleeping have gotten easier. He has some periods where he has no pain. End range left rotation is still difficult when his neck is hurting.       Initial:}     4/10              Post Session:        3/10  Medications Last Reviewed:  2023  Updated Objective Findings:  None Today  Treatment   TREATMENT GRID: Exercises and activities per grid below to improve mobility, strength, , and overall function. Required minimal visual and verbal cues to promote proper body alignment and promote proper body posture. Progressed resistance, range and complexity of movement as indicated. Tx area: cervical spine to L UT 1/27/23 2/1/23   Activity/Exercise     UBE 6 min fwd 6 min fwd   Pt education yes yes   Lift offs on wall     Open books on wall     Sidelying open books     Chin tucks     Pec stretch on doorway      Resistance 2 x 10 ea  Seated abd with OTB; Alt HA GTB 2 x 15  Bilat ext OTB; Alt HA GTB;   Cable Col. Incline UTR     Prone Cervical ROM     Prone Y  2 x 15   Quadruped UTR 3 x 5 bilat    LS on doorway 30s x 2 ea    Wall angels 2 x 10    Kneeling thoracic stretch 20x      HEP: chin tucks, pec stretch on doorway, scap squeezes; gave pt a GTB and added alt HA     MANUAL THERAPY: Cervical: STM UT/LS, c-spine PAs, side glides, manual cervical traction, and suboccipital release     Joint mobilization, Soft tissue mobilization and Manipulation was utilized and necessary because of the patient's restricted joint motion, painful spasm, loss of articular motion and restricted motion of soft tissue. Education: Educated pt on sleeping positions and side sleeper pillows. Educated pt on using thera cane. Pt education for HEP safety, exercise frequency and duration, symptom control, mechanics, fall risk, and anatomy and physiology of present condition/healing time. Treatment/Session Summary:    Treatment Assessment: A 10th visit re-eval was done to assess progression and further need for PT. Pt has had some improvement in ROM and strength and states function and pain intensity are improving also. Skilled intervention is required to address the listed impairments and functional limitations to meet the patient's set goals. Plan is to continue 1x/wk with anticipated dc 3/1/23. Communication/Consultation:  None today  Equipment provided today:  None  Recommendations/Intent for next treatment session: Next visit will focus on advancements to more challenging activities.      Total Treatment Billable Duration: 38 minutes and re-assessment  Time In: 0930  Time Out: 1011       Manual Therapy: (10 minutes)     Ther-Ex: (16 minutes)     Ther-Act: (12 minutes)     Neuro Re-ed (0 minutes)     Modalities: (0 minutes)    Brent Shelton, PT, DPT       Charge Capture  }Post Session Pain  PT Visit Info  [facility] [employer] Portal  MD Guidelines  Scanned Media  Benefits  MyChart    Future Appointments   Date Time Provider Yosvany Ruth   2/8/2023  9:00 AM FLORA Field - CNP [facility] AM[facility]   2/10/2023  9:30 AM [facility], PT [facility]   2/17/2023  9:30 AM [facility], PT [facility]   2/24/2023  9:30 AM [facility], PT [facility]   3/3/2023  9:30 AM Brent Shelton, PT [facility]   7/11/2023  8:00 AM Kaveh Stauffer,  [facility]

## 2023-02-03 ENCOUNTER — APPOINTMENT (OUTPATIENT)
Dept: PHYSICAL THERAPY | Age: 68
End: 2023-02-03
Payer: COMMERCIAL

## 2023-02-08 ENCOUNTER — OFFICE VISIT (OUTPATIENT)
Dept: NEUROSURGERY | Age: 68
End: 2023-02-08
Payer: COMMERCIAL

## 2023-02-08 VITALS
TEMPERATURE: 97.4 F | HEIGHT: 72 IN | SYSTOLIC BLOOD PRESSURE: 139 MMHG | DIASTOLIC BLOOD PRESSURE: 79 MMHG | OXYGEN SATURATION: 95 % | BODY MASS INDEX: 32.91 KG/M2 | HEART RATE: 61 BPM | WEIGHT: 243 LBS

## 2023-02-08 DIAGNOSIS — M54.12 CERVICAL RADICULOPATHY: Primary | ICD-10-CM

## 2023-02-08 PROCEDURE — 99212 OFFICE O/P EST SF 10 MIN: CPT | Performed by: NURSE PRACTITIONER

## 2023-02-08 PROCEDURE — 1123F ACP DISCUSS/DSCN MKR DOCD: CPT | Performed by: NURSE PRACTITIONER

## 2023-02-08 ASSESSMENT — PATIENT HEALTH QUESTIONNAIRE - PHQ9
2. FEELING DOWN, DEPRESSED OR HOPELESS: 0
1. LITTLE INTEREST OR PLEASURE IN DOING THINGS: 0
SUM OF ALL RESPONSES TO PHQ QUESTIONS 1-9: 0
SUM OF ALL RESPONSES TO PHQ9 QUESTIONS 1 & 2: 0

## 2023-02-08 NOTE — PROGRESS NOTES
[facility] NOTE:   History of Present Illness:    CC: Physical therapy follow-up    Anitha Nguyen is a 69 y.o. male here to follow-up after completing 6 weeks of cervical physical therapy. Before therapy patient was experiencing a lot of left-sided neck pain that radiated down into his trapezius and the top of his left forearm muscle. Patient states that he feels like therapy is helping a lot and that he only occasionally gets some tingling into his arm. Patient feels as though this therapy has helped ease off most of his symptoms. Past Medical History:   Diagnosis Date    Anxiety and depression     hx-- no meds in yrs per pt    Diverticulitis     with perforation    Diverticulitis of colon with perforation 1/24/2019    Dyslipidemia     Family history of prostate cancer in father 4/2/2019    GERD (gastroesophageal reflux disease)     HX-- not a problem in several yrs-- no meds    Hepatic steatosis 1/24/2019    Hyperlipidemia, mixed 12/14/2015    Obesity (BMI 30-39. 9) 7/6/2022      Past Surgical History:   Procedure Laterality Date    COLONOSCOPY  2018    Dr Gonzalo Wallace    COLONOSCOPY  2006    KNEE ARTHROSCOPY Left 1980's    OTHER SURGICAL HISTORY      - partial large intestine removed, march, 2019.      MI UNLISTED PROCEDURE ABDOMEN PERITONEUM & OMENTUM  03/2019    spleenic flexure mobilization    REFRACTIVE SURGERY  30 yrs ago    TONSILLECTOMY      as a child    UPPER GASTROINTESTINAL ENDOSCOPY      VASCULAR SURGERY  02/2019    PICC IN right upper arm     No Known Allergies   Family History   Problem Relation Age of Onset    Diabetes Father         Type II (NIDDM)    Heart Disease Father         CHF    Cancer Father         Colon, Prostate, & squamous cell    Cancer Mother         Leukemia    Cancer Sister         Lung    Cancer Brother         Colon      Social History     Socioeconomic History    Marital status:      Spouse name: Not on file    Number of children: Not on file    Years of education: Not on file    Highest education level: Not on file   Occupational History    Not on file   Tobacco Use    Smoking status: Never    Smokeless tobacco: Never   Vaping Use    Vaping Use: Never used   Substance and Sexual Activity    Alcohol use: Yes    Drug use: No    Sexual activity: Not on file   Other Topics Concern    Not on file   Social History Narrative    Not on file     Social Determinants of Health     Financial Resource Strain: Not on file   Food Insecurity: Not on file   Transportation Needs: Not on file   Physical Activity: Not on file   Stress: Not on file   Social Connections: Not on file   Intimate Partner Violence: Not on file   Housing Stability: Not on file     Current Outpatient Medications   Medication Sig Dispense Refill    simvastatin (ZOCOR) 20 MG tablet Take 1 tablet by mouth nightly 30 tablet 5    aspirin 81 MG EC tablet Take 81 mg by mouth daily      diclofenac sodium (VOLTAREN) 1 % GEL Apply 2 g topically 4 times daily 100 g 1     No current facility-administered medications for this visit. Patient Active Problem List   Diagnosis    Hyperlipidemia, mixed    Hepatic steatosis    Obesity (BMI 30-39. 9)    Elevated hemoglobin A1c    Neck pain    Cervical strain          ROS Review of Systems    Constitutional:                    No recent weight changes, fever, fatigue, sleep difficulties, loss of appetite   ENT/Mouth:  No hearing loss, No ringing in the ears, chronic sinus problem, nose bleeds,sore throat, voice change, hoarseness, swollen glands in neck, or difficulties with chewing and swallowing. Cardiovascular:  No chest pain/angina pectoris, palpitations, swelling of feet/ankles/hands, or calf pain while walking. Respiratory: No chronic or frequent coughs, spitting up blood, shortness of breath, No asthma, or wheezing.      Gastrointestinal: No a bdominal pain, heartburn, nausea, vomiting, constipation, or frequent diarrhea     Genitourinary: No frequent urination, burning or painful urination, or blood in urine     Musculoskeletal:   POS joint pain, stiffness/swelling, POS weakness of muscles, or POSmuscle pain/cramp     Integument:   No rash/itching     Neurological:   Dizziness/vertigo, No numbness/tingling sensation, tremors, No weakness in limbs, frequent or recurring headaches, memory loss or confusion. Physical Exam:    General: No acute distress  Head normocephalic and atraumatic  Mood and affect appropriate  CV: Regular rate   Resp: No increased work of breathing  Skin: warm and dry   Awake, alert, and oriented   Speech fluent  Eyes open spontaneously   Face symmetric and tongue midline on protrusion  Sternocleidomastoid and trapezius 5/5  No mid-line cervical, thoracic, or lumbar tenderness to palpation   Patient with strength exam as follows:   Upper Extremities: Right Left      Deltoid  5 5    Biceps  5 5    Triceps  5 5      5 5   Hand Intrinsics  5 5  Wrist flexors/extensors  5 5     Lower Extremities:      Hip Flex 5 5    Quads  5 5    Hamstrings 5 5    Dorsiflex 5 5    Plantarflex 5 5    EHL  5 5  Sensation intact to light touch and pin-prick   DTR 2+  No clonus or babinski present   No Verdin's sign present bilaterally       Assessment & Plan:  Hector Rangel is a 69 y.o. male who presents to follow-up after completing 6 weeks of cervical physical therapy. At this time the patient symptoms do seem to have almost completely resolved and he is not interested in doing any further diagnostic testing at this time. Therefore the patient can follow-up here as needed. A total of 12 minutes was spent in chart review, patient consultation, and documentation. No diagnosis found. Notes are transcribed with Joce, a medical voice recording dictation service, and may contain minor errors.     Jose Maria Moffett, TAHMINA  [address]

## 2023-02-10 ENCOUNTER — HOSPITAL ENCOUNTER (OUTPATIENT)
Dept: PHYSICAL THERAPY | Age: 68
Setting detail: RECURRING SERIES
Discharge: HOME | End: 2023-02-13
Payer: COMMERCIAL

## 2023-02-10 PROCEDURE — 97112 NEUROMUSCULAR REEDUCATION: CPT

## 2023-02-10 PROCEDURE — 97140 MANUAL THERAPY 1/> REGIONS: CPT

## 2023-02-10 PROCEDURE — 97110 THERAPEUTIC EXERCISES: CPT

## 2023-02-10 NOTE — PROGRESS NOTES
Ian Brown  : 1955  Primary: Jeana Soulier Sc ([facility])  Secondary:  [address] [address]  Phone: [phone]  Fax: [phone] Plan Frequency: 2x/wk x 8 wks  Plan of Care/Certification Expiration Date: 23      PT Visit Info:  Plan Frequency: 2x/wk x 8 wks  Plan of Care/Certification Expiration Date: 23  Total # of Visits Approved: 60 (including eval)  Total # of Visits to Date: 11  Progress Note Counter: 2     Visit Count:  11   OUTPATIENT PHYSICAL THERAPY:OP NOTE TYPE: Treatment Note 2/10/2023       Episode  }Appt Desk             Treatment Diagnosis:  Cervicalgia (M54.2)  Abnormal posture (R29.3)  Medical/Referring Diagnosis:  Neck pain on left side [M54.2]  Referring Provider:  FLORA Fleming CNP  Orders:  PT Eval and Treat   Date of Onset:  Onset Date: 10/03/22     Allergies:   Patient has no known allergies. Restrictions/Precautions:    Interventions Planned (Treatment may consist of any combination of the following):    Current Treatment Recommendations: Strengthening; ROM; Balance training; Functional mobility training; Neuromuscular re-education; Manual; Pain management; Home exercise program; Patient/Caregiver education & training; Therapeutic activities; Modalities     REASON FOR TREATMENT: pain in cervical spine due to degenerative changes with a flexion preference. Functional limitations reported at initial Eval: turning his head left, checking blind spot while driving, and prolonged sitting at his desk. Subjective Comments: Pt says his follow up with his NP went well. He sometimes has tingling in his L UT, when he first wakes up, but he has no symptoms to start. He says he usually has no pan for for the rest of the day after coming to PT. He is compliant with his HEP.       Initial:}     4/10              Post Session:        3/10  Medications Last Reviewed:  2/10/2023  Updated Objective Findings:  None Today  Treatment   TREATMENT GRID: Exercises and activities per grid below to improve mobility, strength, , and overall function. Required minimal visual and verbal cues to promote proper body alignment and promote proper body posture. Progressed resistance, range and complexity of movement as indicated. Tx area: cervical spine to L UT 2/1/23 2/10/23   Activity/Exercise     UBE 6 min fwd 6 min fwd   Pt education yes yes   Lift offs on wall     Open books on wall     Sidelying open books     Chin tucks     Pec stretch on doorway      Resistance 2 x 15  Bilat ext OTB; Alt HA GTB; 2 x 15  Bilat ext OTB;    Cable Col. Incline UTR     Prone Cervical ROM     Prone Y 2 x 15    Quadruped UTR  3 x 5 bilat   LS on doorway     Wall angels     Kneeling thoracic stretch     Ball on wall shld flxn  2 x 15   Serratus slides with FR on wall  3 x 10      HEP: chin tucks, pec stretch on doorway, scap squeezes; gave pt a GTB and added alt HA     MANUAL THERAPY: Cervical: STM UT/LS, c-spine PAs, side glides, manual cervical traction, and suboccipital release     Joint mobilization, Soft tissue mobilization and Manipulation was utilized and necessary because of the patient's restricted joint motion, painful spasm, loss of articular motion and restricted motion of soft tissue. Education: Educated pt on sleeping positions and side sleeper pillows. Educated pt on using thera cane. Pt education for HEP safety, exercise frequency and duration, symptom control, mechanics, fall risk, and anatomy and physiology of present condition/healing time. Treatment/Session Summary:    Treatment Assessment: Pt is making slow but noticeable improvement session to session. His scapular control and cervical ROM is improving. Communication/Consultation:  None today  Equipment provided today:  None  Recommendations/Intent for next treatment session: Next visit will focus on advancements to more challenging activities. *  Total Treatment Billable Duration: 38 minutes   Time In: 0930  Time Out: 1008       Manual Therapy: (10 minutes)     Ther-Ex: (16 minutes)     Ther-Act: (12 minutes)     Neuro Re-ed (0 minutes)     Modalities: (0 minutes)    Prisca Gabriel PT, DPT       Charge Capture  }Post Session Pain  PT Visit Info  [employer] Portal  MD Guidelines  Scanned Media  Benefits  MyChart    Future Appointments   Date Time Provider Yosvany Meadowsi   2/17/2023  9:30 AM [facility], PT [facility]   2/24/2023  9:30 AM [facility], PT [facility]   3/3/2023  9:30 AM Prisca Gabriel PT [facility]   7/11/2023  8:00 AM [facility]

## 2023-02-17 ENCOUNTER — HOSPITAL ENCOUNTER (OUTPATIENT)
Dept: PHYSICAL THERAPY | Age: 68
Setting detail: RECURRING SERIES
Discharge: HOME | End: 2023-02-20
Payer: COMMERCIAL

## 2023-02-17 PROCEDURE — 97110 THERAPEUTIC EXERCISES: CPT

## 2023-02-17 PROCEDURE — 97140 MANUAL THERAPY 1/> REGIONS: CPT

## 2023-02-17 NOTE — PROGRESS NOTES
Joyce Tyson  : 1955  Primary: Virgie Cushing Sc ([facility]  Secondary:  [address] [address]  Phone: [phone]  Fax: [phone] Plan Frequency: 2x/wk x 8 wks  Plan of Care/Certification Expiration Date: 23      PT Visit Info:  Plan Frequency: 2x/wk x 8 wks  Plan of Care/Certification Expiration Date: 23  Total # of Visits Approved: 60 (including eval)  Total # of Visits to Date: 12  Progress Note Counter: 3     Visit Count:  12   OUTPATIENT PHYSICAL THERAPY:OP NOTE TYPE: Treatment Note 2023       Episode  }Appt Desk             Treatment Diagnosis:  Cervicalgia (M54.2)  Abnormal posture (R29.3)  Medical/Referring Diagnosis:  Neck pain on left side [M54.2]  Referring Provider:  FLORA Fuentes CNP  Orders:  PT Eval and Treat   Date of Onset:  Onset Date: 10/03/22     Allergies:   Patient has no known allergies. Restrictions/Precautions:    Interventions Planned (Treatment may consist of any combination of the following):    Current Treatment Recommendations: Strengthening; ROM; Balance training; Functional mobility training; Neuromuscular re-education; Manual; Pain management; Home exercise program; Patient/Caregiver education & training; Therapeutic activities; Modalities     REASON FOR TREATMENT: pain in cervical spine due to degenerative changes with a flexion preference. Functional limitations reported at initial Eval: turning his head left, checking blind spot while driving, and prolonged sitting at his desk. Subjective Comments: Pt says his symptoms are minimal today. His symptoms are worst first thing in the am. PT and his new pillow are helping. He is compliant with his HEP.       Initial:}     4/10              Post Session:        3/10  Medications Last Reviewed:  2023  Updated Objective Findings:  None Today  Treatment   TREATMENT GRID: Exercises and activities per grid below to improve mobility, strength, , and overall function. Required minimal visual and verbal cues to promote proper body alignment and promote proper body posture. Progressed resistance, range and complexity of movement as indicated. Tx area: cervical spine to L UT 2/10/23 2/17/23   Activity/Exercise     UBE 6 min fwd 6 min fwd   Pt education yes yes   Lift offs on wall     Open books on wall  2 x 15 bilat   Sidelying open books     Chin tucks  30x   Pec stretch on doorway      Resistance 2 x 15  Bilat ext OTB;     Cable Col. Incline UTR     Prone Cervical ROM     Prone Y     Quadruped UTR 3 x 5 bilat    LS on doorway  30s x 2 on L   Wall angels     Kneeling thoracic stretch     Ball on wall shld flxn 2 x 15 Elbows on wall 2 x 15        Serratus slides with FR on wall 3 x 10  3 x 10    Supine thoracic mob  2 x 5      HEP: chin tucks, pec stretch on doorway, scap squeezes; gave pt a GTB and added alt HA     MANUAL THERAPY: Cervical: STM UT/LS, c-spine PAs, side glides, manual cervical traction, and suboccipital release     Joint mobilization, Soft tissue mobilization and Manipulation was utilized and necessary because of the patient's restricted joint motion, painful spasm, loss of articular motion and restricted motion of soft tissue. Education: Educated pt on some gently ROM exercises he can do prior to getting out of bed. Educated pt on sleeping positions and side sleeper pillows. Educated pt on using thera cane. Pt education for HEP safety, exercise frequency and duration, symptom control, mechanics, fall risk, and anatomy and physiology of present condition/healing time. Treatment/Session Summary:    Treatment Assessment: Pt is making slow but noticeable improvement session to session. Communication/Consultation:  None today  Equipment provided today:  None  Recommendations/Intent for next treatment session: Next visit will focus on advancements to more challenging activities.    *  Total Treatment Billable Duration: 39 minutes   Time In: 0931  Time Out: 1010       Manual Therapy: (16 minutes)     Ther-Ex: (24 minutes)     Ther-Act: (0 minutes)     Neuro Re-ed (0 minutes)     Modalities: (0 minutes)    Doyle Case, PT, DPT       Charge Capture  }Post Session Pain  PT Visit Info  [employer] Portal  MD Guidelines  Scanned Media  Benefits  MyChart    Future Appointments   Date Time Provider Yosvany Miranda   2/24/2023  9:30 AM Feliz Camacho [place], PT [facility]   3/3/2023  9:30 AM Feliz Camacho [place], PT [facility]   7/11/2023  8:00 AM Suraj Stauffer, DO [address] [facility] AMB

## 2023-02-24 ENCOUNTER — HOSPITAL ENCOUNTER (OUTPATIENT)
Dept: PHYSICAL THERAPY | Age: 68
Setting detail: RECURRING SERIES
Discharge: HOME | End: 2023-02-27
Payer: COMMERCIAL

## 2023-02-24 PROCEDURE — 97110 THERAPEUTIC EXERCISES: CPT

## 2023-02-24 PROCEDURE — 97140 MANUAL THERAPY 1/> REGIONS: CPT

## 2023-02-24 NOTE — PROGRESS NOTES
Anitha Nguyen  : 1955  Primary: Domonique Birmingham ([facility])  Secondary:  [address] [address]  Phone: [phone]  Fax: [phone] Plan Frequency: 2x/wk x 8 wks  Plan of Care/Certification Expiration Date: 23      PT Visit Info:  Plan Frequency: 2x/wk x 8 wks  Plan of Care/Certification Expiration Date: 23  Total # of Visits Approved: 60 (including eval)  Total # of Visits to Date: 13  Progress Note Counter: 4     Visit Count:  13   OUTPATIENT PHYSICAL THERAPY:OP NOTE TYPE: Treatment Note 2023       Episode  }Appt Desk             Treatment Diagnosis:  Cervicalgia (M54.2)  Abnormal posture (R29.3)  Medical/Referring Diagnosis:  Neck pain on left side [M54.2]  Referring Provider:  FLORA Astorga CNP  Orders:  PT Eval and Treat   Date of Onset:  Onset Date: 10/03/22     Allergies:   Patient has no known allergies. Restrictions/Precautions:    Interventions Planned (Treatment may consist of any combination of the following):    Current Treatment Recommendations: Strengthening; ROM; Balance training; Functional mobility training; Neuromuscular re-education; Manual; Pain management; Home exercise program; Patient/Caregiver education & training; Therapeutic activities; Modalities     REASON FOR TREATMENT: pain in cervical spine due to degenerative changes with a flexion preference. Functional limitations reported at initial Eval: turning his head left, checking blind spot while driving, and prolonged sitting at his desk. Subjective Comments: Pt says his symptoms are minimal today. Fast left cervical rotation while driving can still be irritable. He is compliant with his HEP.       Initial:}     4/10              Post Session:        3/10  Medications Last Reviewed:  2023  Updated Objective Findings:  None Today  Treatment   TREATMENT GRID: Exercises and activities per grid below to improve mobility, strength, , and overall function. Required minimal visual and verbal cues to promote proper body alignment and promote proper body posture. Progressed resistance, range and complexity of movement as indicated. Tx area: cervical spine to L UT 2/17/23 2/24/23   Activity/Exercise     UBE 6 min fwd 6 min fwd   Pt education yes yes   Lift offs on wall     Open books on wall 2 x 15 bilat    Sidelying open books     Chin tucks 30x    Pec stretch on doorway      Resistance     Cable Col. Incline UTR     Prone Cervical ROM     Prone Y     Quadruped UTR  3 x 10 bilat   LS on doorway 30s x 2 on L    Wall angels     Kneeling thoracic stretch     Ball on wall shld flxn Elbows on wall 2 x 15         Serratus slides with FR on wall 3 x 10  3 X 10    Hard waves bilat back to wall with ball on wrists  3 x 10    Supine thoracic mob 2 x 5       HEP: chin tucks, pec stretch on doorway, scap squeezes; gave pt a GTB and added alt HA     MANUAL THERAPY: Cervical: STM UT/LS, c-spine PAs, side glides, manual cervical traction, and suboccipital release     Joint mobilization, Soft tissue mobilization and Manipulation was utilized and necessary because of the patient's restricted joint motion, painful spasm, loss of articular motion and restricted motion of soft tissue. Education: Educated pt on some gently ROM exercises he can do prior to getting out of bed. Educated pt on sleeping positions and side sleeper pillows. Educated pt on using thera cane. Pt education for HEP safety, exercise frequency and duration, symptom control, mechanics, fall risk, and anatomy and physiology of present condition/healing time. Treatment/Session Summary:    Treatment Assessment  Pt continues to demonstrate improving tolerance for strengthening and mobility exercises.       Communication/Consultation:  None today  Equipment provided today:  None  Recommendations/Intent for next treatment session: Next visit will focus on advancements to more challenging activities.      Total Treatment Billable Duration: 40 minutes   Time In: 0930  Time Out: 1010       Manual Therapy: (16 minutes)     Ther-Ex: (24 minutes)     Ther-Act: (0 minutes)     Neuro Re-ed (0 minutes)     Modalities: (0 minutes)    Héctor Larson, PT, DPT       Charge Capture  }Post Session Pain  PT Visit Info  [employer] Portal  MD Guidelines  Scanned Media  Benefits  MyChart    Future Appointments   Date Time Provider Yosvany Miranda   3/3/2023  9:30 AM Nate Beck, PT [facility]   7/11/2023  8:00 AM Zelalem Stauffer, DO [address] [facility] AMB

## 2023-03-03 ENCOUNTER — HOSPITAL ENCOUNTER (OUTPATIENT)
Dept: PHYSICAL THERAPY | Age: 68
Setting detail: RECURRING SERIES
Discharge: HOME | End: 2023-03-06
Payer: COMMERCIAL

## 2023-03-03 PROCEDURE — 97140 MANUAL THERAPY 1/> REGIONS: CPT

## 2023-03-03 PROCEDURE — 97110 THERAPEUTIC EXERCISES: CPT

## 2023-03-03 NOTE — PROGRESS NOTES
Litzy Britton  : 1955  Primary: Brandan [place] ([facility]  Secondary:  [address] [address]  Phone: [phone]  Fax: [phone] Plan Frequency: 2x/wk x 8 wks  Plan of Care/Certification Expiration Date: 23      PT Visit Info:  Plan Frequency: 2x/wk x 8 wks  Plan of Care/Certification Expiration Date: 23  Total # of Visits Approved: 60 (including eval)  Total # of Visits to Date: 14  Progress Note Counter: 5     Visit Count:  14   OUTPATIENT PHYSICAL THERAPY:OP NOTE TYPE: Treatment Note 3/3/2023       Episode  }Appt Desk             Treatment Diagnosis:  Cervicalgia (M54.2)  Abnormal posture (R29.3)  Medical/Referring Diagnosis:  Neck pain on left side [M54.2]  Referring Provider:  Jerry July, APRN - CNP  Orders:  PT Eval and Treat   Date of Onset:  Onset Date: 10/03/22     Allergies:   Patient has no known allergies. Restrictions/Precautions:    Interventions Planned (Treatment may consist of any combination of the following):    Current Treatment Recommendations: Strengthening; ROM; Balance training; Functional mobility training; Neuromuscular re-education; Manual; Pain management; Home exercise program; Patient/Caregiver education & training; Therapeutic activities; Modalities     REASON FOR TREATMENT: pain in cervical spine due to degenerative changes with a flexion preference. Functional limitations reported at initial Eval: turning his head left, checking blind spot while driving, and prolonged sitting at his desk. Subjective Comments: Pt says his symptoms are minimal today. He is compliant with his HEP. He is is considering stopping PT due to an unexpected large bill he received. He will follow up next week.       Initial:}     4/10              Post Session:        3/10  Medications Last Reviewed:  3/3/2023  Updated Objective Findings:  None Today  Treatment   TREATMENT GRID: Exercises and activities per grid below to improve mobility, strength, , and overall function. Required minimal visual and verbal cues to promote proper body alignment and promote proper body posture. Progressed resistance, range and complexity of movement as indicated. Tx area: cervical spine to L UT 2/24/23 3/3/23   Activity/Exercise     UBE 6 min fwd 6 min fwd   Pt education yes yes   Lift offs on wall     Open books on wall     Sidelying open books     Chin tucks     Pec stretch on doorway      Resistance     Lat pull downs  2 x 15 22.5# bilat   Rows  27.5# 2 x 15   Incline UTR     Prone Cervical ROM     Prone Y     Quadruped UTR 3 x 10 bilat    LS on doorway     Wall angels  2 x 15   Kneeling thoracic stretch     Ball on wall shld flxn          Serratus slides with FR on wall 3 X 10     Hard waves bilat back to wall with ball on wrists 3 x 10     Supine thoracic mob     Bilat ext with TB  2 x 15 OTB     HEP: chin tucks, pec stretch on doorway, scap squeezes; gave pt a GTB and added alt HA     MANUAL THERAPY: Cervical: STM UT/LS, c-spine PAs, side glides, manual cervical traction, and suboccipital release     Joint mobilization, Soft tissue mobilization and Manipulation was utilized and necessary because of the patient's restricted joint motion, painful spasm, loss of articular motion and restricted motion of soft tissue. Education: Educated pt on some gently ROM exercises he can do prior to getting out of bed. Educated pt on sleeping positions and side sleeper pillows. Educated pt on using thera cane. Pt education for HEP safety, exercise frequency and duration, symptom control, mechanics, fall risk, and anatomy and physiology of present condition/healing time. Treatment/Session Summary:    Treatment Assessment: Pt continues to demonstrate improving tolerance for strengthening and mobility exercises. Possible discharge next week for insurance reasons.       Communication/Consultation:  None today  Equipment provided today: None  Recommendations/Intent for next treatment session: Next visit will focus on advancements to more challenging activities.      Total Treatment Billable Duration: 40 minutes   Time In: 0930  Time Out: 1010       Manual Therapy: (16 minutes)     Ther-Ex: (24 minutes)     Ther-Act: (0 minutes)     Neuro Re-ed (0 minutes)     Modalities: (0 minutes)    Lay Greco, PT, DPT       Charge Capture  }Post Session Pain  PT Visit Info  [employer] Portal  MD Guidelines  Scanned Media  Benefits  MyChart    Future Appointments   Date Time Provider Yosvany Miranda   3/10/2023  8:45 AM Sánchez Beck, PT [facility]   7/11/2023  8:00 AM Kemar Stauffer, DO [address] [facility] AMB

## 2023-03-10 ENCOUNTER — HOSPITAL ENCOUNTER (OUTPATIENT)
Dept: PHYSICAL THERAPY | Age: 68
Setting detail: RECURRING SERIES
Discharge: HOME | End: 2023-03-13
Payer: COMMERCIAL

## 2023-03-10 PROCEDURE — 97110 THERAPEUTIC EXERCISES: CPT

## 2023-03-10 PROCEDURE — 97140 MANUAL THERAPY 1/> REGIONS: CPT

## 2023-03-10 NOTE — PROGRESS NOTES
Zoë Jay  : 1955  Primary: Candy Evans Sc ([employer])  Secondary:  [address] [address]  Phone: [phone]  Fax: [phone] Plan Frequency: 2x/wk x 8 wks  Plan of Care/Certification Expiration Date: 23      PT Visit Info:  Plan Frequency: 2x/wk x 8 wks  Plan of Care/Certification Expiration Date: 23  Total # of Visits Approved: 60 (including eval)  Total # of Visits to Date: 15  Progress Note Counter: 6     Visit Count:  15   OUTPATIENT PHYSICAL THERAPY:OP NOTE TYPE: Treatment Note 3/10/2023       Episode  }Appt Desk             Treatment Diagnosis:  Cervicalgia (M54.2)  Abnormal posture (R29.3)  Medical/Referring Diagnosis:  Neck pain on left side [M54.2]  Referring Provider:  FLORA Tamayo CNP  Orders:  PT Eval and Treat   Date of Onset:  Onset Date: 10/03/22     Allergies:   Patient has no known allergies. Restrictions/Precautions:    Interventions Planned (Treatment may consist of any combination of the following):    Current Treatment Recommendations: Strengthening; ROM; Balance training; Functional mobility training; Neuromuscular re-education; Manual; Pain management; Home exercise program; Patient/Caregiver education & training; Therapeutic activities; Modalities     REASON FOR TREATMENT: pain in cervical spine due to degenerative changes with a flexion preference. Functional limitations reported at initial Eval: turning his head left, checking blind spot while driving, and prolonged sitting at his desk. Subjective Comments: Pt says his symptoms are minimal today. He gets about 24 hours of relief of symptoms after physical therapy. He is compliant with his HEP. He is considering stopping PT due to an unexpected bill he received. He would like to leave his POC open for now while he waits on more information.       Initial:}     4/10              Post Session:        3/10  Medications Last Reviewed:  3/10/2023  Updated Objective Findings:  None Today  Treatment   TREATMENT GRID: Exercises and activities per grid below to improve mobility, strength, , and overall function. Required minimal visual and verbal cues to promote proper body alignment and promote proper body posture. Progressed resistance, range and complexity of movement as indicated. Tx area: cervical spine to L UT 3/3/23 3/10/23   Activity/Exercise     UBE (supervised self mobilization while discussing functional progress and current symptoms) 6 min fwd 6 min    Pt education yes yes   Lift offs on wall     Open books on wall     Sidelying open books     Chin tucks     Pec stretch on doorway      Resistance     Lat pull downs 2 x 15 22.5# bilat 2 x 15 22.5# bilat   Rows 27.5# 2 x 15 27.5# 2 x 15   Incline UTR     Prone Cervical ROM     Prone Y     Quadruped UTR     LS on doorway     Wall angels 2 x 15    Kneeling thoracic stretch     Ball on wall shld flxn          Serratus slides with FR on wall     Hard waves bilat back to wall with ball on wrists     Supine thoracic mob     Bilat ext with TB 2 x 15 OTB    TRX plank alt HA  2 x 15 (tingling in L UE to eblow)   TRX  Incline alt HA     HEP: chin tucks, pec stretch on doorway, scap squeezes; gave pt a GTB and added alt HA     MANUAL THERAPY: Cervical: STM UT/LS, c-spine PAs, side glides, manual cervical traction, and suboccipital release     Joint mobilization, Soft tissue mobilization and Manipulation was utilized and necessary because of the patient's restricted joint motion, painful spasm, loss of articular motion and restricted motion of soft tissue. Education: Educated pt on some gently ROM exercises he can do prior to getting out of bed. Educated pt on sleeping positions and side sleeper pillows. Educated pt on using thera cane.  Pt education for HEP safety, exercise frequency and duration, symptom control, mechanics, fall risk, and anatomy and physiology of present condition/healing time. Treatment/Session Summary:    Treatment Assessment: Pt had relief and improved ROM following manual.     Communication/Consultation:  None today  Equipment provided today:  None  Recommendations/Intent for next treatment session: Next visit will focus on advancements to more challenging activities.      Total Treatment Billable Duration: 40 minutes   Time In: 0845  Time Out: 7918       Manual Therapy: (16 minutes)     Ther-Ex: (24 minutes)     Ther-Act: (0 minutes)     Neuro Re-ed (0 minutes)     Modalities: (0 minutes)    Judith Avilez, PT, DPT       Charge Capture  }Post Session Pain  PT Visit Info  [employer] Portal  MD Guidelines  Scanned Media  Benefits  MyChart    Future Appointments   Date Time Provider Yosvany Meadowsi   7/11/2023  8:00 AM Aline Stauffer DO [address] [facility]

## 2023-05-16 ENCOUNTER — OFFICE VISIT (OUTPATIENT)
Dept: INTERNAL MEDICINE CLINIC | Facility: CLINIC | Age: 68
End: 2023-05-16
Payer: COMMERCIAL

## 2023-05-16 VITALS
OXYGEN SATURATION: 96 % | TEMPERATURE: 98.1 F | SYSTOLIC BLOOD PRESSURE: 123 MMHG | HEIGHT: 72 IN | HEART RATE: 58 BPM | DIASTOLIC BLOOD PRESSURE: 79 MMHG | BODY MASS INDEX: 32.37 KG/M2 | RESPIRATION RATE: 12 BRPM | WEIGHT: 239 LBS

## 2023-05-16 DIAGNOSIS — L71.9 ROSACEA: Primary | ICD-10-CM

## 2023-05-16 DIAGNOSIS — E78.2 HYPERLIPIDEMIA, MIXED: ICD-10-CM

## 2023-05-16 PROCEDURE — 1123F ACP DISCUSS/DSCN MKR DOCD: CPT | Performed by: INTERNAL MEDICINE

## 2023-05-16 PROCEDURE — 99213 OFFICE O/P EST LOW 20 MIN: CPT | Performed by: INTERNAL MEDICINE

## 2023-05-16 RX ORDER — AZELAIC ACID 0.15 G/G
GEL TOPICAL
Qty: 1 EACH | Refills: 1 | Status: SHIPPED | OUTPATIENT
Start: 2023-05-16

## 2023-05-16 RX ORDER — SIMVASTATIN 20 MG
20 TABLET ORAL NIGHTLY
Qty: 30 TABLET | Refills: 5 | Status: SHIPPED | OUTPATIENT
Start: 2023-05-16 | End: 2023-05-16

## 2023-05-16 RX ORDER — SIMVASTATIN 20 MG
20 TABLET ORAL NIGHTLY
Qty: 30 TABLET | Refills: 5 | Status: SHIPPED | OUTPATIENT
Start: 2023-05-16

## 2023-05-16 SDOH — ECONOMIC STABILITY: FOOD INSECURITY: WITHIN THE PAST 12 MONTHS, YOU WORRIED THAT YOUR FOOD WOULD RUN OUT BEFORE YOU GOT MONEY TO BUY MORE.: NEVER TRUE

## 2023-05-16 SDOH — ECONOMIC STABILITY: HOUSING INSECURITY
IN THE LAST 12 MONTHS, WAS THERE A TIME WHEN YOU DID NOT HAVE A STEADY PLACE TO SLEEP OR SLEPT IN A SHELTER (INCLUDING NOW)?: NO

## 2023-05-16 SDOH — ECONOMIC STABILITY: INCOME INSECURITY: HOW HARD IS IT FOR YOU TO PAY FOR THE VERY BASICS LIKE FOOD, HOUSING, MEDICAL CARE, AND HEATING?: NOT HARD AT ALL

## 2023-05-16 SDOH — ECONOMIC STABILITY: FOOD INSECURITY: WITHIN THE PAST 12 MONTHS, THE FOOD YOU BOUGHT JUST DIDN'T LAST AND YOU DIDN'T HAVE MONEY TO GET MORE.: NEVER TRUE

## 2023-05-16 ASSESSMENT — ANXIETY QUESTIONNAIRES
7. FEELING AFRAID AS IF SOMETHING AWFUL MIGHT HAPPEN: 0
5. BEING SO RESTLESS THAT IT IS HARD TO SIT STILL: 0
4. TROUBLE RELAXING: 0
3. WORRYING TOO MUCH ABOUT DIFFERENT THINGS: 0
6. BECOMING EASILY ANNOYED OR IRRITABLE: 0
1. FEELING NERVOUS, ANXIOUS, OR ON EDGE: 0
2. NOT BEING ABLE TO STOP OR CONTROL WORRYING: 0
IF YOU CHECKED OFF ANY PROBLEMS ON THIS QUESTIONNAIRE, HOW DIFFICULT HAVE THESE PROBLEMS MADE IT FOR YOU TO DO YOUR WORK, TAKE CARE OF THINGS AT HOME, OR GET ALONG WITH OTHER PEOPLE: NOT DIFFICULT AT ALL
GAD7 TOTAL SCORE: 0

## 2023-05-16 ASSESSMENT — PATIENT HEALTH QUESTIONNAIRE - PHQ9
2. FEELING DOWN, DEPRESSED OR HOPELESS: 0
SUM OF ALL RESPONSES TO PHQ QUESTIONS 1-9: 0
SUM OF ALL RESPONSES TO PHQ9 QUESTIONS 1 & 2: 0
1. LITTLE INTEREST OR PLEASURE IN DOING THINGS: 0

## 2023-05-16 NOTE — PROGRESS NOTES
Sveta Aldana D.O. [facility]  [address], [address] [id number]  Tel: [phone]    Office Visit: Follow Up     Patient Name: Ronnell Heaton   :  1955   MRN:   [id number]      Today's Date: 23 10:51 AM    Subjective     The patient is a 77y.o.-year-old male who presents for follow-up. He has chief complaint of a rash on his face under both eyes. He states it does not change or get better or worse with food or exposure to sun. He denies any burning or itching. He was taking Lycopene for his prostate but stopped due to thinking it was causing the rash. He states the rash normally comes and goes but has been there for about a month now. He denies any itching or pain with the rash. He denies any papules or pustules with the rash either. Review of Systems   All other systems reviewed and are negative. Past Medical History:   Diagnosis Date    Anxiety and depression     hx-- no meds in yrs per pt    Diverticulitis     with perforation    Diverticulitis of colon with perforation 2019    Dyslipidemia     Family history of prostate cancer in father 2019    GERD (gastroesophageal reflux disease)     HX-- not a problem in several yrs-- no meds    Hepatic steatosis 2019    Hyperlipidemia, mixed 2015    Obesity (BMI 30-39. 9) 2022     Social History     Socioeconomic History    Marital status:      Spouse name: Not on file    Number of children: Not on file    Years of education: Not on file    Highest education level: Not on file   Occupational History    Not on file   Tobacco Use    Smoking status: Never    Smokeless tobacco: Never   Vaping Use    Vaping Use: Never used   Substance and Sexual Activity    Alcohol use: Yes    Drug use: No    Sexual activity: Not on file   Other Topics Concern    Not on file   Social History Narrative    Not on file     Social Determinants of Health     Financial Resource Strain: Low Risk

## 2023-07-05 ENCOUNTER — OFFICE VISIT (OUTPATIENT)
Dept: INTERNAL MEDICINE CLINIC | Facility: CLINIC | Age: 68
End: 2023-07-05
Payer: COMMERCIAL

## 2023-07-05 VITALS
DIASTOLIC BLOOD PRESSURE: 69 MMHG | HEIGHT: 72 IN | OXYGEN SATURATION: 98 % | WEIGHT: 240.4 LBS | TEMPERATURE: 98.6 F | BODY MASS INDEX: 32.56 KG/M2 | HEART RATE: 60 BPM | SYSTOLIC BLOOD PRESSURE: 117 MMHG

## 2023-07-05 DIAGNOSIS — R73.09 ELEVATED HEMOGLOBIN A1C: ICD-10-CM

## 2023-07-05 DIAGNOSIS — K76.0 HEPATIC STEATOSIS: ICD-10-CM

## 2023-07-05 DIAGNOSIS — E78.2 HYPERLIPIDEMIA, MIXED: ICD-10-CM

## 2023-07-05 DIAGNOSIS — H10.32 ACUTE CONJUNCTIVITIS OF LEFT EYE, UNSPECIFIED ACUTE CONJUNCTIVITIS TYPE: Primary | ICD-10-CM

## 2023-07-05 LAB
ALBUMIN SERPL-MCNC: 3.8 G/DL (ref 3.2–4.6)
ALBUMIN/GLOB SERPL: 1.2 (ref 0.4–1.6)
ALP SERPL-CCNC: 49 U/L (ref 50–136)
ALT SERPL-CCNC: 25 U/L (ref 12–65)
ANION GAP SERPL CALC-SCNC: 3 MMOL/L (ref 2–11)
AST SERPL-CCNC: 19 U/L (ref 15–37)
BASOPHILS # BLD: 0.1 K/UL (ref 0–0.2)
BASOPHILS NFR BLD: 1 % (ref 0–2)
BILIRUB SERPL-MCNC: 0.5 MG/DL (ref 0.2–1.1)
BUN SERPL-MCNC: 12 MG/DL (ref 8–23)
CALCIUM SERPL-MCNC: 8.9 MG/DL (ref 8.3–10.4)
CHLORIDE SERPL-SCNC: 107 MMOL/L (ref 101–110)
CO2 SERPL-SCNC: 27 MMOL/L (ref 21–32)
CREAT SERPL-MCNC: 0.8 MG/DL (ref 0.8–1.5)
DIFFERENTIAL METHOD BLD: NORMAL
EOSINOPHIL # BLD: 0.2 K/UL (ref 0–0.8)
EOSINOPHIL NFR BLD: 3 % (ref 0.5–7.8)
ERYTHROCYTE [DISTWIDTH] IN BLOOD BY AUTOMATED COUNT: 13.3 % (ref 11.9–14.6)
GLOBULIN SER CALC-MCNC: 3.1 G/DL (ref 2.8–4.5)
GLUCOSE SERPL-MCNC: 107 MG/DL (ref 65–100)
HCT VFR BLD AUTO: 45.8 % (ref 41.1–50.3)
HGB BLD-MCNC: 15.1 G/DL (ref 13.6–17.2)
IMM GRANULOCYTES # BLD AUTO: 0 K/UL (ref 0–0.5)
IMM GRANULOCYTES NFR BLD AUTO: 0 % (ref 0–5)
LYMPHOCYTES # BLD: 2.1 K/UL (ref 0.5–4.6)
LYMPHOCYTES NFR BLD: 35 % (ref 13–44)
MCH RBC QN AUTO: 27.3 PG (ref 26.1–32.9)
MCHC RBC AUTO-ENTMCNC: 33 G/DL (ref 31.4–35)
MCV RBC AUTO: 82.7 FL (ref 82–102)
MONOCYTES # BLD: 0.6 K/UL (ref 0.1–1.3)
MONOCYTES NFR BLD: 10 % (ref 4–12)
NEUTS SEG # BLD: 3.1 K/UL (ref 1.7–8.2)
NEUTS SEG NFR BLD: 51 % (ref 43–78)
NRBC # BLD: 0 K/UL (ref 0–0.2)
PLATELET # BLD AUTO: 209 K/UL (ref 150–450)
PMV BLD AUTO: 10.8 FL (ref 9.4–12.3)
POTASSIUM SERPL-SCNC: 4.3 MMOL/L (ref 3.5–5.1)
PROT SERPL-MCNC: 6.9 G/DL (ref 6.3–8.2)
RBC # BLD AUTO: 5.54 M/UL (ref 4.23–5.6)
SODIUM SERPL-SCNC: 137 MMOL/L (ref 133–143)
WBC # BLD AUTO: 6.1 K/UL (ref 4.3–11.1)

## 2023-07-05 PROCEDURE — 1123F ACP DISCUSS/DSCN MKR DOCD: CPT | Performed by: NURSE PRACTITIONER

## 2023-07-05 PROCEDURE — 99213 OFFICE O/P EST LOW 20 MIN: CPT | Performed by: NURSE PRACTITIONER

## 2023-07-05 RX ORDER — FLUTICASONE PROPIONATE 0.05 %
CREAM (GRAM) TOPICAL 2 TIMES DAILY
COMMUNITY
Start: 2023-07-03

## 2023-07-05 RX ORDER — GENTAMICIN SULFATE 3 MG/ML
1 SOLUTION/ DROPS OPHTHALMIC EVERY 4 HOURS
Qty: 1 EACH | Refills: 0 | Status: SHIPPED | OUTPATIENT
Start: 2023-07-05 | End: 2023-07-12

## 2023-07-05 ASSESSMENT — ENCOUNTER SYMPTOMS
COUGH: 0
EYE REDNESS: 1
DIARRHEA: 0
ABDOMINAL PAIN: 0
RHINORRHEA: 0
NAUSEA: 0
EYE ITCHING: 1
SHORTNESS OF BREATH: 0
SINUS PAIN: 0
EYE DISCHARGE: 1
CONSTIPATION: 0
EYE PAIN: 0
SORE THROAT: 0
BACK PAIN: 0
VOMITING: 0

## 2023-07-05 NOTE — PROGRESS NOTES
[facility]  [address]  Tel# [phone]  Fax# [phone]     Manjula Doss, [facility]  Family Nurse Practitioner            Date of Visit: 2023     Debra Pickens (: 1955) is a 77 y.o. 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 He states it began yesterday. The pt reports he has had redness, itching, and possible matting of the eye. The pt has a lab appt following this visit. Patient Care Team:  Marychuy Villareal DO as PCP - [address],  as PCP - Empaneled Provider         History of Present Illness          Same Day Visit  PCP: Dr. Rosi Gonzales here with complaint of left eye redness that he noticed yesterday around noon. Associated with mild itching. Noticed redness worsen by the end of day. Woke up this morning with worsening left eye redness and matting. Spouse gave pt saline eye drops this morning. Denies any known exposure from any family member with pink eye. Denies any fever, congestion, or sore throat. Tobacco Use: Low Risk     Smoking Tobacco Use: Never    Smokeless Tobacco Use: Never    Passive Exposure: Not on file        Patient Active Problem List   Diagnosis    Hyperlipidemia, mixed    Hepatic steatosis    Obesity (BMI 30-39. 9)    Elevated hemoglobin A1c    Neck pain    Cervical strain         Past Medical History:   Diagnosis Date    Anxiety and depression     hx-- no meds in yrs per pt    Diverticulitis     with perforation    Diverticulitis of colon with perforation 2019    Dyslipidemia     Family history of prostate cancer in father 2019    GERD (gastroesophageal reflux disease)     HX-- not a problem in several yrs-- no meds    Hepatic steatosis 2019    Hyperlipidemia, mixed 2015    Obesity (BMI 30-39. 9)

## 2023-07-06 LAB
EST. AVERAGE GLUCOSE BLD GHB EST-MCNC: 120 MG/DL
HBA1C MFR BLD: 5.8 % (ref 4.8–5.6)

## 2023-07-10 ASSESSMENT — PATIENT HEALTH QUESTIONNAIRE - PHQ9
1. LITTLE INTEREST OR PLEASURE IN DOING THINGS: 0
2. FEELING DOWN, DEPRESSED OR HOPELESS: 0
SUM OF ALL RESPONSES TO PHQ QUESTIONS 1-9: 0
1. LITTLE INTEREST OR PLEASURE IN DOING THINGS: NOT AT ALL
SUM OF ALL RESPONSES TO PHQ QUESTIONS 1-9: 0
SUM OF ALL RESPONSES TO PHQ9 QUESTIONS 1 & 2: 0
2. FEELING DOWN, DEPRESSED OR HOPELESS: NOT AT ALL
SUM OF ALL RESPONSES TO PHQ9 QUESTIONS 1 & 2: 0

## 2023-07-11 ENCOUNTER — OFFICE VISIT (OUTPATIENT)
Dept: INTERNAL MEDICINE CLINIC | Facility: CLINIC | Age: 68
End: 2023-07-11
Payer: COMMERCIAL

## 2023-07-11 VITALS
OXYGEN SATURATION: 95 % | DIASTOLIC BLOOD PRESSURE: 72 MMHG | BODY MASS INDEX: 32.64 KG/M2 | HEART RATE: 58 BPM | HEIGHT: 72 IN | SYSTOLIC BLOOD PRESSURE: 114 MMHG | RESPIRATION RATE: 12 BRPM | WEIGHT: 241 LBS | TEMPERATURE: 98.2 F

## 2023-07-11 DIAGNOSIS — K76.0 HEPATIC STEATOSIS: ICD-10-CM

## 2023-07-11 DIAGNOSIS — E66.9 OBESITY (BMI 30-39.9): ICD-10-CM

## 2023-07-11 DIAGNOSIS — R73.09 ELEVATED HEMOGLOBIN A1C: ICD-10-CM

## 2023-07-11 DIAGNOSIS — E78.2 HYPERLIPIDEMIA, MIXED: Primary | ICD-10-CM

## 2023-07-11 DIAGNOSIS — Z12.12 ENCOUNTER FOR SCREENING FOR COLORECTAL MALIGNANT NEOPLASM: ICD-10-CM

## 2023-07-11 DIAGNOSIS — Z12.11 ENCOUNTER FOR SCREENING FOR COLORECTAL MALIGNANT NEOPLASM: ICD-10-CM

## 2023-07-11 PROBLEM — S16.1XXA CERVICAL STRAIN: Status: RESOLVED | Noted: 2022-11-09 | Resolved: 2023-07-11

## 2023-07-11 PROBLEM — M54.2 NECK PAIN: Status: RESOLVED | Noted: 2022-11-09 | Resolved: 2023-07-11

## 2023-07-11 PROCEDURE — 1123F ACP DISCUSS/DSCN MKR DOCD: CPT | Performed by: INTERNAL MEDICINE

## 2023-07-11 PROCEDURE — 99214 OFFICE O/P EST MOD 30 MIN: CPT | Performed by: INTERNAL MEDICINE

## 2023-07-11 RX ORDER — SIMVASTATIN 20 MG
20 TABLET ORAL NIGHTLY
Qty: 90 TABLET | Refills: 1 | Status: SHIPPED | OUTPATIENT
Start: 2023-07-11

## 2023-07-11 NOTE — PROGRESS NOTES
Arianne Fitzgerald D.O. [address] [address]  Tel: [phone]    Office Visit: Follow Up     Patient Name: Jose Judge   :  1955   MRN:   [id number]      Today's Date: 23 8:05 AM    Subjective     The patient is a 77y.o.-year-old male who presents for follow-up. Hyperlipidemia  -Lipid panel from 2022 showed total cholesterol of 191, HDL of 70, LDL of 96, triglycerides of 145  -Simvastatin 20 mg qhs  -He states he is doing well with is diet and has been trying to cut out carcohydrates as much as he can. He is still going to [employer] a few times a week. Hepatic steatosis  -Liver panel from 2023 showed stable liver function tests    Obesity  -BMI 32.60, weight 240 pounds  -He states he is doing well with is diet and has been trying to cut out carcohydrates as much as he can. He is still going to [employer] a few times a week. Elevated hemoglobin A1c  -Hemoglobin A1c on 2023 was 5.8, down from 6.0 on 2023  -He states he is doing well with is diet and has been trying to cut out carcohydrates as much as he can. He is still going to [employer] a few times a week. Rosacea  -Azelaic acid 15% gel  -Follow-up with dermatology on 2023 and was given cultivate 0.05% cream    Health maintenance  -Last colonoscopy was in 2018 with a 5-year follow-up, he is due for colonoscopy this year    Today:   Patient was recently seen by Torey Chilel NP on 2023 for acute conjunctivitis of the left eye. Patient was given gentamicin 0.3% ophthalmic solution. Patient states his eye is improving. Review of Systems   All other systems reviewed and are negative.      Past Medical History:   Diagnosis Date    Anxiety and depression     hx-- no meds in yrs per pt    Cervical strain 2022    Diverticulitis     with perforation    Diverticulitis of colon with perforation 2019    Dyslipidemia     Family

## 2023-12-24 DIAGNOSIS — E78.2 HYPERLIPIDEMIA, MIXED: ICD-10-CM

## 2024-01-02 RX ORDER — SIMVASTATIN 20 MG
20 TABLET ORAL NIGHTLY
Qty: 90 TABLET | Refills: 1 | Status: SHIPPED | OUTPATIENT
Start: 2024-01-02

## 2024-03-17 NOTE — BRIEF OP NOTE
BRIEF OPERATIVE NOTE    Date of Procedure: 3/19/2019   Preoperative Diagnosis: Diverticulitis of large intestine with perforation, unspecified bleeding status [K57.20]  Postoperative Diagnosis: Diverticulitis of large intestine with perforation, unspecified bleeding status [K57.20]    Procedure(s):  ROBOTIC SIGMOID COLECTOMY WITH PRIMARY ANASTOMOSIS  SPLENIC FLEXURE MOBILIZATION    Surgeon(s) and Role:     * Eyad Jimenez MD - Primary         Surgical Assistant: None    Surgical Staff:  Circ-1: Carmelita Bush RN  Circ-Relief: Mary Dent RN  Scrub Tech-1: Pauline Alcala Tech-2: Jael MARTIN  Scrub Tech-Relief: Amanda Araiza CNA  Scrub Tech-3: Obdulio Arevalo  Event Time In Time Out   Incision Start 0820    Incision Close       Anesthesia: General   Estimated Blood Loss: <25 CC  Specimens:   ID Type Source Tests Collected by Time Destination   1 : sigmoid colon Fresh Colon  Eyad Jimenez MD 3/19/2019 1159 Pathology   2 : donuts Preservative Colon  Eyad Jimenez MD 3/19/2019 1232 Pathology      Findings: No air leak.  Intact 29mm EAA anastomosis  Complications: None  Implants: * No implants in log *
Speaking Coherently

## (undated) DEVICE — STAPLER 45 RELOAD: Brand: ENDOWRIST

## (undated) DEVICE — DRSG AQUACEL SURG 3.5X6IN -- CONVERT TO ITEM 369227

## (undated) DEVICE — FLUORESCENCE IMAGING PROCEDURE KIT: Brand: FIREFLY

## (undated) DEVICE — SPONGE LAP 18X18IN STRL -- 5/PK

## (undated) DEVICE — CADIERE FORCEPS: Brand: ENDOWRIST

## (undated) DEVICE — DRAPE,UNDERBUTTOCKS,PCH,STERILE: Brand: MEDLINE

## (undated) DEVICE — SEAL UNIV 5-8MM DISP BX/10 -- DA VINCI XI - SNGL USE

## (undated) DEVICE — FENESTRATED BIPOLAR FORCEPS: Brand: ENDOWRIST

## (undated) DEVICE — SUTURE PERMAHAND SZ 2-0 L12X18IN NONABSORBABLE BLK SILK A185H

## (undated) DEVICE — LAP CHOLE: Brand: MEDLINE INDUSTRIES, INC.

## (undated) DEVICE — CARDINAL HEALTH FLEXIBLE LIGHT HANDLE COVER: Brand: CARDINAL HEALTH

## (undated) DEVICE — REM POLYHESIVE ADULT PATIENT RETURN ELECTRODE: Brand: VALLEYLAB

## (undated) DEVICE — MONOPOLAR CAUTERY CORD

## (undated) DEVICE — STANDARD HYPODERMIC NEEDLE,POLYPROPYLENE HUB: Brand: MONOJECT

## (undated) DEVICE — SCISSORS SURG DIA8MM MPLR CRV ENDOWRIST

## (undated) DEVICE — BLADE ASSEMB CLP HAIR FINE --

## (undated) DEVICE — BASIC SINGLE BASIN-LF: Brand: MEDLINE INDUSTRIES, INC.

## (undated) DEVICE — DRAPE,TOP,102X53,STERILE: Brand: MEDLINE

## (undated) DEVICE — (D)PREP SKN CHLRAPRP APPL 26ML -- CONVERT TO ITEM 371833

## (undated) DEVICE — APPLICATOR FBR TIP L6IN COT TIP WOOD SHFT SWAB 2000 PER CA

## (undated) DEVICE — SUTURE VCRL SZ 0 L36IN ABSRB UD L48MM CTX 1/2 CIR J978H

## (undated) DEVICE — SEAL

## (undated) DEVICE — GOWN,REINF,POLY,ECL,PP SLV,XL: Brand: MEDLINE

## (undated) DEVICE — MEDI-VAC NON-CONDUCTIVE SUCTION TUBING: Brand: CARDINAL HEALTH

## (undated) DEVICE — LEGGINGS, PAIR, 31X48, STERILE: Brand: MEDLINE

## (undated) DEVICE — MEDI-VAC YANKAUER SUCTION HANDLE W/BULBOUS TIP: Brand: CARDINAL HEALTH

## (undated) DEVICE — TRAY PREP DRY W/ PREM GLV 2 APPL 6 SPNG 2 UNDPD 1 OVERWRAP

## (undated) DEVICE — KENDALL SCD EXPRESS SLEEVES, KNEE LENGTH, MEDIUM: Brand: KENDALL SCD

## (undated) DEVICE — REDUCER CANN ENDOWRIST 12-8MM -- DA VINCI XI - SNGL USE

## (undated) DEVICE — WOUND RETRACTOR AND PROTECTOR: Brand: ALEXIS O WOUND PROTECTOR-RETRACTOR

## (undated) DEVICE — STAPLER 45: Brand: ENDOWRIST

## (undated) DEVICE — ARM DRAPE

## (undated) DEVICE — BLADELESS OBTURATOR: Brand: WECK VISTA

## (undated) DEVICE — COVER EQUIP ABSRB TBL MOJAVE SHT L228XW101CM

## (undated) DEVICE — STAPLER INT L28CM DIA29MM CLS STPL H10-2.5MM OPN LEG L5.5MM

## (undated) DEVICE — SUTURE VCRL SZ 3-0 L27IN ABSRB UD L26MM SH 1/2 CIR J416H

## (undated) DEVICE — BIPOLAR CAUTERY CORD

## (undated) DEVICE — SUTURE MCRYL SZ 4-0 L27IN ABSRB UD L19MM PS-2 1/2 CIR PRIM Y426H

## (undated) DEVICE — DRAPE TWL SURG 16X26IN BLU ORB04] ALLCARE INC]

## (undated) DEVICE — TIP COVER ACCESSORY

## (undated) DEVICE — STAPLER SHEATH: Brand: ENDOWRIST

## (undated) DEVICE — TRAY CATH 16F URIN MTR LTX -- CONVERT TO ITEM 363111

## (undated) DEVICE — ELECTRO LUBE IS A SINGLE PATIENT USE DEVICE THAT IS INTENDED TO BE USED ON ELECTROSURGICAL ELECTRODES TO REDUCE STICKING.: Brand: KEY SURGICAL ELECTRO LUBE

## (undated) DEVICE — GOWN,REINFORCED,POLY,AURORA,XXLARGE,STR: Brand: MEDLINE

## (undated) DEVICE — SCOPE RECT LED W INSUFFLATOR

## (undated) DEVICE — MASTISOL ADHESIVE LIQ 2/3ML

## (undated) DEVICE — [HIGH FLOW INSUFFLATOR,  DO NOT USE IF PACKAGE IS DAMAGED,  KEEP DRY,  KEEP AWAY FROM SUNLIGHT,  PROTECT FROM HEAT AND RADIOACTIVE SOURCES.]: Brand: PNEUMOSURE

## (undated) DEVICE — STAPLER 45 RELOAD BLUE: Brand: ENDOWRIST

## (undated) DEVICE — (D)STRIP SKN CLSR 0.5X4IN WHT --

## (undated) DEVICE — PURSE STRING DEVICE: Brand: PURSTRING

## (undated) DEVICE — COLUMN DRAPE

## (undated) DEVICE — DISPOSABLE GRASPER CARTRIDGE: Brand: DIRECT DRIVE REPOSABLE GRASPERS